# Patient Record
Sex: FEMALE | Race: WHITE | NOT HISPANIC OR LATINO | Employment: FULL TIME | RURAL
[De-identification: names, ages, dates, MRNs, and addresses within clinical notes are randomized per-mention and may not be internally consistent; named-entity substitution may affect disease eponyms.]

---

## 2019-04-03 ENCOUNTER — HISTORICAL (OUTPATIENT)
Dept: ADMINISTRATIVE | Facility: HOSPITAL | Age: 59
End: 2019-04-03

## 2019-04-05 LAB
LAB AP CLINICAL INFORMATION: NORMAL
LAB AP COMMENTS: NORMAL
LAB AP DIAGNOSIS - HISTORICAL: NORMAL
LAB AP GROSS PATHOLOGY - HISTORICAL: NORMAL
LAB AP SPECIMEN SUBMITTED - HISTORICAL: NORMAL

## 2019-06-11 ENCOUNTER — HISTORICAL (OUTPATIENT)
Dept: ADMINISTRATIVE | Facility: HOSPITAL | Age: 59
End: 2019-06-11

## 2019-06-12 LAB
LAB AP CLINICAL INFORMATION: NORMAL
LAB AP DIAGNOSIS - HISTORICAL: NORMAL
LAB AP GROSS PATHOLOGY - HISTORICAL: NORMAL
LAB AP SPECIMEN SUBMITTED - HISTORICAL: NORMAL

## 2020-04-06 ENCOUNTER — HISTORICAL (OUTPATIENT)
Dept: ADMINISTRATIVE | Facility: HOSPITAL | Age: 60
End: 2020-04-06

## 2020-07-17 ENCOUNTER — HISTORICAL (OUTPATIENT)
Dept: ADMINISTRATIVE | Facility: HOSPITAL | Age: 60
End: 2020-07-17

## 2020-11-16 ENCOUNTER — HISTORICAL (OUTPATIENT)
Dept: ADMINISTRATIVE | Facility: HOSPITAL | Age: 60
End: 2020-11-16

## 2020-11-16 LAB
ALBUMIN SERPL BCP-MCNC: 3.7 G/DL (ref 3.5–5)
ALBUMIN/GLOB SERPL: 0.9 {RATIO}
ALP SERPL-CCNC: 105 U/L (ref 46–118)
ALT SERPL W P-5'-P-CCNC: 25 U/L (ref 13–56)
ANION GAP SERPL CALCULATED.3IONS-SCNC: 9.8 MMOL/L (ref 7–16)
AST SERPL W P-5'-P-CCNC: 18 U/L (ref 15–37)
BASOPHILS # BLD AUTO: 0.07 X10E3/UL (ref 0–0.2)
BASOPHILS NFR BLD AUTO: 0.7 % (ref 0–1)
BILIRUB SERPL-MCNC: 0.3 MG/DL (ref 0–1.2)
BUN SERPL-MCNC: 15 MG/DL (ref 7–18)
BUN/CREAT SERPL: 16
CALCIUM SERPL-MCNC: 9.3 MG/DL (ref 8.5–10.1)
CHLORIDE SERPL-SCNC: 102 MMOL/L (ref 98–107)
CHOLEST SERPL-MCNC: 196 MG/DL
CHOLEST/HDLC SERPL: 3.2 {RATIO}
CO2 SERPL-SCNC: 28 MMOL/L (ref 21–32)
CREAT SERPL-MCNC: 0.92 MG/DL (ref 0.5–1.02)
CRP SERPL-MCNC: 1.45 MG/DL (ref 0–0.8)
EOSINOPHIL # BLD AUTO: 0.43 X10E3/UL (ref 0–0.5)
EOSINOPHIL NFR BLD AUTO: 4.3 % (ref 1–4)
ERYTHROCYTE [DISTWIDTH] IN BLOOD BY AUTOMATED COUNT: 13.5 % (ref 11.5–14.5)
GLOBULIN SER-MCNC: 4.1 G/DL (ref 2–4)
GLUCOSE SERPL-MCNC: 95 MG/DL (ref 74–106)
HCT VFR BLD AUTO: 38.6 % (ref 38–47)
HDLC SERPL-MCNC: 62 MG/DL
HGB BLD-MCNC: 12.2 G/DL (ref 12–16)
IMM GRANULOCYTES # BLD AUTO: 0.05 X10E3/UL (ref 0–0.04)
IMM GRANULOCYTES NFR BLD: 0.5 % (ref 0–0.4)
LDLC SERPL CALC-MCNC: 107 MG/DL
LYMPHOCYTES # BLD AUTO: 2.09 X10E3/UL (ref 1–4.8)
LYMPHOCYTES NFR BLD AUTO: 20.9 % (ref 27–41)
MCH RBC QN AUTO: 28.3 PG (ref 27–31)
MCHC RBC AUTO-ENTMCNC: 31.6 G/DL (ref 32–36)
MCV RBC AUTO: 89.6 FL (ref 80–96)
MONOCYTES # BLD AUTO: 0.65 X10E3/UL (ref 0–0.8)
MONOCYTES NFR BLD AUTO: 6.5 % (ref 2–6)
MPC BLD CALC-MCNC: 12 FL (ref 9.4–12.4)
NEUTROPHILS # BLD AUTO: 6.7 X10E3/UL (ref 1.8–7.7)
NEUTROPHILS NFR BLD AUTO: 67.1 % (ref 53–65)
NRBC # BLD AUTO: 0 X10E3/UL (ref 0–0)
NRBC, AUTO (.00): 0 /100 (ref 0–0)
NT-PROBNP SERPL-MCNC: 110 PG/ML (ref 1–125)
PLATELET # BLD AUTO: 290 X10E3/UL (ref 150–400)
POTASSIUM SERPL-SCNC: 4.8 MMOL/L (ref 3.5–5.1)
PROT SERPL-MCNC: 7.8 G/DL (ref 6.4–8.2)
RBC # BLD AUTO: 4.31 X10E6/UL (ref 4.2–5.4)
SODIUM SERPL-SCNC: 135 MMOL/L (ref 136–145)
TRIGL SERPL-MCNC: 134 MG/DL
WBC # BLD AUTO: 9.99 X10E3/UL (ref 4.5–11)

## 2021-03-25 ENCOUNTER — OFFICE VISIT (OUTPATIENT)
Dept: FAMILY MEDICINE | Facility: CLINIC | Age: 61
End: 2021-03-25
Payer: COMMERCIAL

## 2021-03-25 VITALS
HEART RATE: 62 BPM | WEIGHT: 293 LBS | HEIGHT: 66 IN | OXYGEN SATURATION: 98 % | DIASTOLIC BLOOD PRESSURE: 84 MMHG | SYSTOLIC BLOOD PRESSURE: 138 MMHG | RESPIRATION RATE: 18 BRPM | TEMPERATURE: 98 F | BODY MASS INDEX: 47.09 KG/M2

## 2021-03-25 DIAGNOSIS — R53.83 FATIGUE, UNSPECIFIED TYPE: ICD-10-CM

## 2021-03-25 DIAGNOSIS — I10 HYPERTENSION, UNSPECIFIED TYPE: Primary | ICD-10-CM

## 2021-03-25 DIAGNOSIS — J45.909 ASTHMA, UNSPECIFIED ASTHMA SEVERITY, UNSPECIFIED WHETHER COMPLICATED, UNSPECIFIED WHETHER PERSISTENT: ICD-10-CM

## 2021-03-25 DIAGNOSIS — K21.9 GASTROESOPHAGEAL REFLUX DISEASE, UNSPECIFIED WHETHER ESOPHAGITIS PRESENT: ICD-10-CM

## 2021-03-25 DIAGNOSIS — R60.9 EDEMA, UNSPECIFIED TYPE: ICD-10-CM

## 2021-03-25 DIAGNOSIS — E78.5 HYPERLIPIDEMIA, UNSPECIFIED HYPERLIPIDEMIA TYPE: ICD-10-CM

## 2021-03-25 LAB
ALBUMIN SERPL BCP-MCNC: 4.1 G/DL (ref 3.5–5)
ALBUMIN/GLOB SERPL: 1.2 {RATIO}
ALP SERPL-CCNC: 101 U/L (ref 46–118)
ALT SERPL W P-5'-P-CCNC: 18 U/L (ref 13–56)
ANION GAP SERPL CALCULATED.3IONS-SCNC: 10 MMOL/L (ref 7–16)
AST SERPL W P-5'-P-CCNC: 11 U/L (ref 15–37)
BASOPHILS # BLD AUTO: 0.07 X10E3/UL (ref 0–0.2)
BASOPHILS NFR BLD AUTO: 0.8 % (ref 0–1)
BILIRUB SERPL-MCNC: 0.3 MG/DL (ref 0–1.2)
BUN SERPL-MCNC: 20 MG/DL (ref 7–18)
BUN/CREAT SERPL: 20
CALCIUM SERPL-MCNC: 9.2 MG/DL (ref 8.5–10.1)
CHLORIDE SERPL-SCNC: 105 MMOL/L (ref 98–107)
CHOLEST SERPL-MCNC: 217 MG/DL
CO2 SERPL-SCNC: 27 MMOL/L (ref 21–32)
CREAT SERPL-MCNC: 0.98 MG/DL (ref 0.5–1.02)
EOSINOPHIL # BLD AUTO: 0.4 X10E3/UL (ref 0–0.5)
EOSINOPHIL NFR BLD AUTO: 4.7 % (ref 1–4)
ERYTHROCYTE [DISTWIDTH] IN BLOOD BY AUTOMATED COUNT: 13.4 % (ref 11.5–14.5)
GLOBULIN SER-MCNC: 3.4 G/DL (ref 2–4)
GLUCOSE SERPL-MCNC: 79 MG/DL (ref 74–106)
HCT VFR BLD AUTO: 39.3 % (ref 38–47)
HDLC SERPL-MCNC: 62 MG/DL
HGB BLD-MCNC: 12.3 G/DL (ref 12–16)
IMM GRANULOCYTES # BLD AUTO: 0.03 X10E3/UL (ref 0–0.04)
IMM GRANULOCYTES NFR BLD: 0.4 % (ref 0–0.4)
LDLC SERPL CALC-MCNC: 132 MG/DL
LYMPHOCYTES # BLD AUTO: 2.01 X10E3/UL (ref 1–4.8)
LYMPHOCYTES NFR BLD AUTO: 23.8 % (ref 27–41)
MAGNESIUM SERPL-MCNC: 2.3 MG/DL (ref 1.7–2.3)
MCH RBC QN AUTO: 28.7 PG (ref 27–31)
MCHC RBC AUTO-ENTMCNC: 31.3 G/DL (ref 32–36)
MCV RBC AUTO: 91.6 FL (ref 80–96)
MONOCYTES # BLD AUTO: 0.52 X10E3/UL (ref 0–0.8)
MONOCYTES NFR BLD AUTO: 6.2 % (ref 2–6)
MPC BLD CALC-MCNC: 12.2 FL (ref 9.4–12.4)
NEUTROPHILS # BLD AUTO: 5.41 X10E3/UL (ref 1.8–7.7)
NEUTROPHILS NFR BLD AUTO: 64.1 % (ref 53–65)
NONHDLC SERPL-MCNC: 155 MG/DL
NRBC # BLD AUTO: 0 X10E3/UL (ref 0–0)
NRBC, AUTO (.00): 0 /100 (ref 0–0)
PLATELET # BLD AUTO: 280 X10E3/UL (ref 150–400)
POTASSIUM SERPL-SCNC: 4.3 MMOL/L (ref 3.5–5.1)
PROT SERPL-MCNC: 7.5 G/DL (ref 6.4–8.2)
RBC # BLD AUTO: 4.29 X10E6/UL (ref 4.2–5.4)
SODIUM SERPL-SCNC: 138 MMOL/L (ref 136–145)
TRIGL SERPL-MCNC: 117 MG/DL
TSH SERPL DL<=0.005 MIU/L-ACNC: 2.28 UIU/ML (ref 0.36–3.74)
VLDLC SERPL-MCNC: 23 MG/DL
WBC # BLD AUTO: 8.44 X10E3/UL (ref 4.5–11)

## 2021-03-25 PROCEDURE — 99213 PR OFFICE/OUTPT VISIT, EST, LEVL III, 20-29 MIN: ICD-10-PCS | Mod: ,,, | Performed by: NURSE PRACTITIONER

## 2021-03-25 PROCEDURE — 80061 LIPID PANEL: CPT

## 2021-03-25 PROCEDURE — 83735 ASSAY OF MAGNESIUM: CPT

## 2021-03-25 PROCEDURE — 85025 COMPLETE CBC W/AUTO DIFF WBC: CPT

## 2021-03-25 PROCEDURE — 36415 COLL VENOUS BLD VENIPUNCTURE: CPT

## 2021-03-25 PROCEDURE — 84443 ASSAY THYROID STIM HORMONE: CPT

## 2021-03-25 PROCEDURE — 99213 OFFICE O/P EST LOW 20 MIN: CPT | Mod: ,,, | Performed by: NURSE PRACTITIONER

## 2021-03-25 PROCEDURE — 80053 COMPREHEN METABOLIC PANEL: CPT

## 2021-03-25 RX ORDER — DEXLANSOPRAZOLE 60 MG/1
60 CAPSULE, DELAYED RELEASE ORAL DAILY
Qty: 90 CAPSULE | Refills: 1 | Status: SHIPPED | OUTPATIENT
Start: 2021-03-25 | End: 2021-09-28

## 2021-03-25 RX ORDER — OLMESARTAN MEDOXOMIL 40 MG/1
40 TABLET ORAL DAILY
COMMUNITY
End: 2021-03-25 | Stop reason: SDUPTHER

## 2021-03-25 RX ORDER — BUDESONIDE AND FORMOTEROL FUMARATE DIHYDRATE 160; 4.5 UG/1; UG/1
2 AEROSOL RESPIRATORY (INHALATION) EVERY 12 HOURS
COMMUNITY
End: 2021-03-25 | Stop reason: SDUPTHER

## 2021-03-25 RX ORDER — CLONIDINE HYDROCHLORIDE 0.1 MG/1
0.1 TABLET ORAL 3 TIMES DAILY
Qty: 270 TABLET | Refills: 1 | Status: SHIPPED | OUTPATIENT
Start: 2021-03-25 | End: 2022-04-07 | Stop reason: SDUPTHER

## 2021-03-25 RX ORDER — DEXLANSOPRAZOLE 60 MG/1
60 CAPSULE, DELAYED RELEASE ORAL DAILY
COMMUNITY
End: 2021-03-25 | Stop reason: SDUPTHER

## 2021-03-25 RX ORDER — ALBUTEROL SULFATE 90 UG/1
2 AEROSOL, METERED RESPIRATORY (INHALATION) EVERY 4 HOURS PRN
COMMUNITY
End: 2022-04-07 | Stop reason: SDUPTHER

## 2021-03-25 RX ORDER — CARVEDILOL 12.5 MG/1
12.5 TABLET ORAL 2 TIMES DAILY WITH MEALS
COMMUNITY
End: 2021-03-25 | Stop reason: SDUPTHER

## 2021-03-25 RX ORDER — BUDESONIDE AND FORMOTEROL FUMARATE DIHYDRATE 160; 4.5 UG/1; UG/1
2 AEROSOL RESPIRATORY (INHALATION) EVERY 12 HOURS
Qty: 3 INHALER | Refills: 1 | Status: ON HOLD | OUTPATIENT
Start: 2021-03-25 | End: 2021-06-08 | Stop reason: CLARIF

## 2021-03-25 RX ORDER — FUROSEMIDE 20 MG/1
20 TABLET ORAL DAILY
COMMUNITY
End: 2021-03-25 | Stop reason: SDUPTHER

## 2021-03-25 RX ORDER — FUROSEMIDE 20 MG/1
20 TABLET ORAL DAILY
Qty: 90 TABLET | Refills: 1 | Status: SHIPPED | OUTPATIENT
Start: 2021-03-25 | End: 2022-04-07 | Stop reason: SDUPTHER

## 2021-03-25 RX ORDER — ROSUVASTATIN CALCIUM 5 MG/1
5 TABLET, COATED ORAL NIGHTLY
Status: ON HOLD | COMMUNITY
End: 2021-06-08 | Stop reason: CLARIF

## 2021-03-25 RX ORDER — OLMESARTAN MEDOXOMIL 40 MG/1
40 TABLET ORAL DAILY
Qty: 90 TABLET | Refills: 1 | Status: SHIPPED | OUTPATIENT
Start: 2021-03-25 | End: 2022-04-07 | Stop reason: SDUPTHER

## 2021-03-25 RX ORDER — ALBUTEROL SULFATE 2.5 MG/.5ML
2.5 SOLUTION RESPIRATORY (INHALATION) EVERY 6 HOURS PRN
Status: ON HOLD | COMMUNITY
End: 2021-06-08 | Stop reason: CLARIF

## 2021-03-25 RX ORDER — CARVEDILOL 12.5 MG/1
12.5 TABLET ORAL 2 TIMES DAILY WITH MEALS
Qty: 180 TABLET | Refills: 1 | Status: SHIPPED | OUTPATIENT
Start: 2021-03-25 | End: 2021-09-30

## 2021-03-25 RX ORDER — CLONIDINE HYDROCHLORIDE 0.1 MG/1
0.1 TABLET ORAL 3 TIMES DAILY
COMMUNITY
End: 2021-03-25 | Stop reason: SDUPTHER

## 2021-03-31 ENCOUNTER — TELEPHONE (OUTPATIENT)
Dept: FAMILY MEDICINE | Facility: CLINIC | Age: 61
End: 2021-03-31

## 2021-05-17 ENCOUNTER — HOSPITAL ENCOUNTER (OUTPATIENT)
Dept: RADIOLOGY | Facility: HOSPITAL | Age: 61
Discharge: HOME | End: 2021-05-17
Attending: ORTHOPAEDIC SURGERY
Payer: COMMERCIAL

## 2021-05-17 DIAGNOSIS — M25.562 LEFT KNEE PAIN, UNSPECIFIED CHRONICITY: ICD-10-CM

## 2021-05-17 PROBLEM — S83.282A ACUTE LATERAL MENISCUS TEAR OF LEFT KNEE: Status: ACTIVE | Noted: 2021-05-17

## 2021-05-17 PROCEDURE — 73564 X-RAY EXAM KNEE 4 OR MORE: CPT | Mod: TC,LT

## 2021-06-07 PROBLEM — S83.242A ACUTE MEDIAL MENISCUS TEAR OF LEFT KNEE: Status: ACTIVE | Noted: 2021-06-07

## 2021-06-08 ENCOUNTER — HOSPITAL ENCOUNTER (OUTPATIENT)
Facility: HOSPITAL | Age: 61
Discharge: HOME | End: 2021-06-08
Attending: ORTHOPAEDIC SURGERY | Admitting: ORTHOPAEDIC SURGERY
Payer: COMMERCIAL

## 2021-06-08 ENCOUNTER — ANESTHESIA (OUTPATIENT)
Dept: SURGERY | Facility: HOSPITAL | Age: 61
End: 2021-06-08
Payer: COMMERCIAL

## 2021-06-08 ENCOUNTER — ANESTHESIA EVENT (OUTPATIENT)
Dept: SURGERY | Facility: HOSPITAL | Age: 61
End: 2021-06-08
Payer: COMMERCIAL

## 2021-06-08 VITALS
TEMPERATURE: 97 F | HEART RATE: 67 BPM | OXYGEN SATURATION: 98 % | BODY MASS INDEX: 47.09 KG/M2 | HEIGHT: 66 IN | SYSTOLIC BLOOD PRESSURE: 168 MMHG | WEIGHT: 293 LBS | DIASTOLIC BLOOD PRESSURE: 58 MMHG | RESPIRATION RATE: 16 BRPM

## 2021-06-08 DIAGNOSIS — I10 HTN (HYPERTENSION): ICD-10-CM

## 2021-06-08 DIAGNOSIS — S83.242A ACUTE MEDIAL MENISCUS TEAR OF LEFT KNEE: ICD-10-CM

## 2021-06-08 PROCEDURE — 93010 EKG 12-LEAD: ICD-10-PCS | Mod: ,,, | Performed by: INTERNAL MEDICINE

## 2021-06-08 PROCEDURE — 71000015 HC POSTOP RECOV 1ST HR: Performed by: ORTHOPAEDIC SURGERY

## 2021-06-08 PROCEDURE — 63600175 PHARM REV CODE 636 W HCPCS: Performed by: ORTHOPAEDIC SURGERY

## 2021-06-08 PROCEDURE — 97161 PT EVAL LOW COMPLEX 20 MIN: CPT

## 2021-06-08 PROCEDURE — 27000177 HC AIRWAY, LARYNGEAL MASK: Performed by: ANESTHESIOLOGY

## 2021-06-08 PROCEDURE — 27201423 OPTIME MED/SURG SUP & DEVICES STERILE SUPPLY: Performed by: ORTHOPAEDIC SURGERY

## 2021-06-08 PROCEDURE — 37000008 HC ANESTHESIA 1ST 15 MINUTES: Performed by: ORTHOPAEDIC SURGERY

## 2021-06-08 PROCEDURE — 63600175 PHARM REV CODE 636 W HCPCS: Performed by: ANESTHESIOLOGY

## 2021-06-08 PROCEDURE — 71000016 HC POSTOP RECOV ADDL HR: Performed by: ORTHOPAEDIC SURGERY

## 2021-06-08 PROCEDURE — 25000003 PHARM REV CODE 250: Performed by: ORTHOPAEDIC SURGERY

## 2021-06-08 PROCEDURE — 93005 ELECTROCARDIOGRAM TRACING: CPT

## 2021-06-08 PROCEDURE — 27000655: Performed by: ANESTHESIOLOGY

## 2021-06-08 PROCEDURE — 27000716 HC OXISENSOR PROBE, ANY SIZE: Performed by: ANESTHESIOLOGY

## 2021-06-08 PROCEDURE — D9220A PRA ANESTHESIA: ICD-10-PCS | Mod: ANES,,, | Performed by: ANESTHESIOLOGY

## 2021-06-08 PROCEDURE — 25000003 PHARM REV CODE 250: Performed by: NURSE ANESTHETIST, CERTIFIED REGISTERED

## 2021-06-08 PROCEDURE — 63600175 PHARM REV CODE 636 W HCPCS: Performed by: NURSE ANESTHETIST, CERTIFIED REGISTERED

## 2021-06-08 PROCEDURE — 71000033 HC RECOVERY, INTIAL HOUR: Performed by: ORTHOPAEDIC SURGERY

## 2021-06-08 PROCEDURE — 27000260 *HC AIRWAY ORAL: Performed by: ANESTHESIOLOGY

## 2021-06-08 PROCEDURE — D9220A PRA ANESTHESIA: Mod: ANES,,, | Performed by: ANESTHESIOLOGY

## 2021-06-08 PROCEDURE — 27100168 OPTIME MED/SURG SUP & DEVICES NON-STERILE SUPPLY: Performed by: ORTHOPAEDIC SURGERY

## 2021-06-08 PROCEDURE — 25000003 PHARM REV CODE 250: Performed by: ANESTHESIOLOGY

## 2021-06-08 PROCEDURE — 36000711: Performed by: ORTHOPAEDIC SURGERY

## 2021-06-08 PROCEDURE — 36000710: Performed by: ORTHOPAEDIC SURGERY

## 2021-06-08 PROCEDURE — 93010 ELECTROCARDIOGRAM REPORT: CPT | Mod: ,,, | Performed by: INTERNAL MEDICINE

## 2021-06-08 PROCEDURE — D9220A PRA ANESTHESIA: ICD-10-PCS | Mod: CRNA,,, | Performed by: NURSE ANESTHETIST, CERTIFIED REGISTERED

## 2021-06-08 PROCEDURE — 37000009 HC ANESTHESIA EA ADD 15 MINS: Performed by: ORTHOPAEDIC SURGERY

## 2021-06-08 PROCEDURE — D9220A PRA ANESTHESIA: Mod: CRNA,,, | Performed by: NURSE ANESTHETIST, CERTIFIED REGISTERED

## 2021-06-08 RX ORDER — ONDANSETRON 2 MG/ML
INJECTION INTRAMUSCULAR; INTRAVENOUS
Status: DISCONTINUED | OUTPATIENT
Start: 2021-06-08 | End: 2021-06-08

## 2021-06-08 RX ORDER — HYDROCODONE BITARTRATE AND ACETAMINOPHEN 5; 325 MG/1; MG/1
1 TABLET ORAL EVERY 4 HOURS PRN
Status: DISCONTINUED | OUTPATIENT
Start: 2021-06-08 | End: 2021-06-08 | Stop reason: HOSPADM

## 2021-06-08 RX ORDER — PROPOFOL 10 MG/ML
VIAL (ML) INTRAVENOUS
Status: DISCONTINUED | OUTPATIENT
Start: 2021-06-08 | End: 2021-06-08

## 2021-06-08 RX ORDER — CEFAZOLIN SODIUM 2 G/50ML
2 SOLUTION INTRAVENOUS
Status: COMPLETED | OUTPATIENT
Start: 2021-06-08 | End: 2021-06-08

## 2021-06-08 RX ORDER — ONDANSETRON 2 MG/ML
4 INJECTION INTRAMUSCULAR; INTRAVENOUS DAILY PRN
Status: DISCONTINUED | OUTPATIENT
Start: 2021-06-08 | End: 2021-06-08 | Stop reason: HOSPADM

## 2021-06-08 RX ORDER — DIPHENHYDRAMINE HYDROCHLORIDE 50 MG/ML
25 INJECTION INTRAMUSCULAR; INTRAVENOUS EVERY 6 HOURS PRN
Status: DISCONTINUED | OUTPATIENT
Start: 2021-06-08 | End: 2021-06-08 | Stop reason: HOSPADM

## 2021-06-08 RX ORDER — BUPIVACAINE HYDROCHLORIDE 2.5 MG/ML
INJECTION, SOLUTION EPIDURAL; INFILTRATION; INTRACAUDAL
Status: DISCONTINUED | OUTPATIENT
Start: 2021-06-08 | End: 2021-06-08 | Stop reason: HOSPADM

## 2021-06-08 RX ORDER — EPINEPHRINE 1 MG/ML
INJECTION, SOLUTION INTRACARDIAC; INTRAMUSCULAR; INTRAVENOUS; SUBCUTANEOUS
Status: DISCONTINUED | OUTPATIENT
Start: 2021-06-08 | End: 2021-06-08 | Stop reason: HOSPADM

## 2021-06-08 RX ORDER — ACETAMINOPHEN 500 MG
1000 TABLET ORAL ONCE
Status: COMPLETED | OUTPATIENT
Start: 2021-06-08 | End: 2021-06-08

## 2021-06-08 RX ORDER — LIDOCAINE HYDROCHLORIDE 10 MG/ML
1 INJECTION, SOLUTION EPIDURAL; INFILTRATION; INTRACAUDAL; PERINEURAL ONCE
Status: DISCONTINUED | OUTPATIENT
Start: 2021-06-08 | End: 2021-06-08 | Stop reason: HOSPADM

## 2021-06-08 RX ORDER — MIDAZOLAM HYDROCHLORIDE 1 MG/ML
INJECTION INTRAMUSCULAR; INTRAVENOUS
Status: DISCONTINUED | OUTPATIENT
Start: 2021-06-08 | End: 2021-06-08

## 2021-06-08 RX ORDER — DEXAMETHASONE SODIUM PHOSPHATE 4 MG/ML
INJECTION, SOLUTION INTRA-ARTICULAR; INTRALESIONAL; INTRAMUSCULAR; INTRAVENOUS; SOFT TISSUE
Status: DISCONTINUED | OUTPATIENT
Start: 2021-06-08 | End: 2021-06-08

## 2021-06-08 RX ORDER — PROMETHAZINE HYDROCHLORIDE 25 MG/1
25 TABLET ORAL EVERY 6 HOURS PRN
Status: DISCONTINUED | OUTPATIENT
Start: 2021-06-08 | End: 2021-06-08 | Stop reason: HOSPADM

## 2021-06-08 RX ORDER — SODIUM CHLORIDE 0.9 G/100ML
IRRIGANT IRRIGATION
Status: DISCONTINUED | OUTPATIENT
Start: 2021-06-08 | End: 2021-06-08 | Stop reason: HOSPADM

## 2021-06-08 RX ORDER — FENTANYL CITRATE 50 UG/ML
INJECTION, SOLUTION INTRAMUSCULAR; INTRAVENOUS
Status: DISCONTINUED | OUTPATIENT
Start: 2021-06-08 | End: 2021-06-08

## 2021-06-08 RX ORDER — LIDOCAINE HYDROCHLORIDE 20 MG/ML
INJECTION, SOLUTION EPIDURAL; INFILTRATION; INTRACAUDAL; PERINEURAL
Status: DISCONTINUED | OUTPATIENT
Start: 2021-06-08 | End: 2021-06-08

## 2021-06-08 RX ORDER — SODIUM CHLORIDE, SODIUM LACTATE, POTASSIUM CHLORIDE, CALCIUM CHLORIDE 600; 310; 30; 20 MG/100ML; MG/100ML; MG/100ML; MG/100ML
INJECTION, SOLUTION INTRAVENOUS CONTINUOUS
Status: DISCONTINUED | OUTPATIENT
Start: 2021-06-08 | End: 2021-06-08 | Stop reason: HOSPADM

## 2021-06-08 RX ORDER — HYDROCODONE BITARTRATE AND ACETAMINOPHEN 10; 325 MG/1; MG/1
1 TABLET ORAL EVERY 6 HOURS PRN
Qty: 28 TABLET | Refills: 0 | Status: SHIPPED | OUTPATIENT
Start: 2021-06-08 | End: 2022-04-07

## 2021-06-08 RX ORDER — MORPHINE SULFATE 10 MG/ML
4 INJECTION INTRAMUSCULAR; INTRAVENOUS; SUBCUTANEOUS EVERY 5 MIN PRN
Status: DISCONTINUED | OUTPATIENT
Start: 2021-06-08 | End: 2021-06-08

## 2021-06-08 RX ORDER — MEPERIDINE HYDROCHLORIDE 25 MG/ML
25 INJECTION INTRAMUSCULAR; INTRAVENOUS; SUBCUTANEOUS EVERY 10 MIN PRN
Status: DISCONTINUED | OUTPATIENT
Start: 2021-06-08 | End: 2021-06-08 | Stop reason: HOSPADM

## 2021-06-08 RX ORDER — HYDROCODONE BITARTRATE AND ACETAMINOPHEN 10; 325 MG/1; MG/1
1 TABLET ORAL EVERY 4 HOURS PRN
Status: DISCONTINUED | OUTPATIENT
Start: 2021-06-08 | End: 2021-06-08 | Stop reason: HOSPADM

## 2021-06-08 RX ORDER — ONDANSETRON 4 MG/1
8 TABLET, ORALLY DISINTEGRATING ORAL EVERY 8 HOURS PRN
Status: DISCONTINUED | OUTPATIENT
Start: 2021-06-08 | End: 2021-06-08 | Stop reason: HOSPADM

## 2021-06-08 RX ORDER — ACETAMINOPHEN 500 MG
1000 TABLET ORAL EVERY 6 HOURS PRN
Status: DISCONTINUED | OUTPATIENT
Start: 2021-06-08 | End: 2021-06-08 | Stop reason: HOSPADM

## 2021-06-08 RX ORDER — SODIUM CHLORIDE 9 MG/ML
INJECTION, SOLUTION INTRAVENOUS CONTINUOUS
Status: DISCONTINUED | OUTPATIENT
Start: 2021-06-08 | End: 2021-06-08 | Stop reason: HOSPADM

## 2021-06-08 RX ORDER — HYDROMORPHONE HYDROCHLORIDE 2 MG/ML
0.5 INJECTION, SOLUTION INTRAMUSCULAR; INTRAVENOUS; SUBCUTANEOUS EVERY 5 MIN PRN
Status: DISCONTINUED | OUTPATIENT
Start: 2021-06-08 | End: 2021-06-08 | Stop reason: HOSPADM

## 2021-06-08 RX ADMIN — LIDOCAINE HYDROCHLORIDE 80 MG: 20 INJECTION, SOLUTION INTRAVENOUS at 11:06

## 2021-06-08 RX ADMIN — SODIUM CHLORIDE: 9 INJECTION, SOLUTION INTRAVENOUS at 12:06

## 2021-06-08 RX ADMIN — FENTANYL CITRATE 25 MCG: 50 INJECTION INTRAMUSCULAR; INTRAVENOUS at 12:06

## 2021-06-08 RX ADMIN — HYDROMORPHONE HYDROCHLORIDE 0.5 MG: 2 INJECTION, SOLUTION INTRAMUSCULAR; INTRAVENOUS; SUBCUTANEOUS at 12:06

## 2021-06-08 RX ADMIN — MIDAZOLAM 2 MG: 1 INJECTION INTRAMUSCULAR; INTRAVENOUS at 11:06

## 2021-06-08 RX ADMIN — DEXAMETHASONE SODIUM PHOSPHATE 4 MG: 4 INJECTION, SOLUTION INTRA-ARTICULAR; INTRALESIONAL; INTRAMUSCULAR; INTRAVENOUS; SOFT TISSUE at 12:06

## 2021-06-08 RX ADMIN — SODIUM CHLORIDE: 9 INJECTION, SOLUTION INTRAVENOUS at 08:06

## 2021-06-08 RX ADMIN — FENTANYL CITRATE 25 MCG: 50 INJECTION INTRAMUSCULAR; INTRAVENOUS at 11:06

## 2021-06-08 RX ADMIN — PROPOFOL 200 MG: 10 INJECTION, EMULSION INTRAVENOUS at 11:06

## 2021-06-08 RX ADMIN — PROPOFOL 50 MG: 10 INJECTION, EMULSION INTRAVENOUS at 11:06

## 2021-06-08 RX ADMIN — ACETAMINOPHEN 1000 MG: 500 TABLET ORAL at 10:06

## 2021-06-08 RX ADMIN — ONDANSETRON 4 MG: 2 INJECTION INTRAMUSCULAR; INTRAVENOUS at 11:06

## 2021-06-08 RX ADMIN — FENTANYL CITRATE 50 MCG: 50 INJECTION INTRAMUSCULAR; INTRAVENOUS at 11:06

## 2021-06-08 RX ADMIN — CEFAZOLIN 3 G: 1 INJECTION, POWDER, FOR SOLUTION INTRAVENOUS at 11:06

## 2021-06-23 PROBLEM — S83.242A ACUTE MEDIAL MENISCUS TEAR OF LEFT KNEE: Status: RESOLVED | Noted: 2021-06-07 | Resolved: 2021-06-23

## 2021-06-23 PROBLEM — S83.282A ACUTE LATERAL MENISCUS TEAR OF LEFT KNEE: Status: RESOLVED | Noted: 2021-05-17 | Resolved: 2021-06-23

## 2022-02-10 ENCOUNTER — OFFICE VISIT (OUTPATIENT)
Dept: DERMATOLOGY | Facility: CLINIC | Age: 62
End: 2022-02-10
Payer: COMMERCIAL

## 2022-02-10 DIAGNOSIS — L57.8 OTHER SKIN CHANGES DUE TO CHRONIC EXPOSURE TO NONIONIZING RADIATION: Primary | ICD-10-CM

## 2022-02-10 DIAGNOSIS — L82.1 SEBORRHEIC KERATOSES: ICD-10-CM

## 2022-02-10 DIAGNOSIS — Z80.8 FAMILY HISTORY OF MELANOMA: ICD-10-CM

## 2022-02-10 DIAGNOSIS — Z85.828 HISTORY OF BASAL CELL CARCINOMA OF SKIN: ICD-10-CM

## 2022-02-10 DIAGNOSIS — D48.9 NEOPLASM OF UNCERTAIN BEHAVIOR: ICD-10-CM

## 2022-02-10 DIAGNOSIS — D18.01 CHERRY ANGIOMA: ICD-10-CM

## 2022-02-10 PROCEDURE — 99213 PR OFFICE/OUTPT VISIT, EST, LEVL III, 20-29 MIN: ICD-10-PCS | Mod: 25,,, | Performed by: DERMATOLOGY

## 2022-02-10 PROCEDURE — 88305 PATHOLOGY, DERMATOLOGY: ICD-10-PCS | Mod: 26,,, | Performed by: PATHOLOGY

## 2022-02-10 PROCEDURE — 88305 TISSUE EXAM BY PATHOLOGIST: CPT | Mod: SUR | Performed by: DERMATOLOGY

## 2022-02-10 PROCEDURE — 99213 OFFICE O/P EST LOW 20 MIN: CPT | Mod: 25,,, | Performed by: DERMATOLOGY

## 2022-02-10 PROCEDURE — 99499 NO LOS: ICD-10-PCS | Mod: ,,, | Performed by: DERMATOLOGY

## 2022-02-10 PROCEDURE — 11102 PR TANGENTIAL BIOPSY, SKIN, SINGLE LESION: ICD-10-PCS | Mod: ,,, | Performed by: DERMATOLOGY

## 2022-02-10 PROCEDURE — 88305 TISSUE EXAM BY PATHOLOGIST: CPT | Mod: 26,,, | Performed by: PATHOLOGY

## 2022-02-10 PROCEDURE — 11102 TANGNTL BX SKIN SINGLE LES: CPT | Mod: ,,, | Performed by: DERMATOLOGY

## 2022-02-10 PROCEDURE — 99499 UNLISTED E&M SERVICE: CPT | Mod: ,,, | Performed by: DERMATOLOGY

## 2022-02-10 PROCEDURE — 1159F MED LIST DOCD IN RCRD: CPT | Mod: CPTII,,, | Performed by: DERMATOLOGY

## 2022-02-10 PROCEDURE — 1160F RVW MEDS BY RX/DR IN RCRD: CPT | Mod: CPTII,,, | Performed by: DERMATOLOGY

## 2022-02-10 PROCEDURE — 1159F PR MEDICATION LIST DOCUMENTED IN MEDICAL RECORD: ICD-10-PCS | Mod: CPTII,,, | Performed by: DERMATOLOGY

## 2022-02-10 PROCEDURE — 1160F PR REVIEW ALL MEDS BY PRESCRIBER/CLIN PHARMACIST DOCUMENTED: ICD-10-PCS | Mod: CPTII,,, | Performed by: DERMATOLOGY

## 2022-02-10 NOTE — PROGRESS NOTES
[facility]   Leila Thornton MD    Patient Name: Kat Nam  Patient YOB: 1960   Date of Service: 2/10/22    CC: Neoplasm of uncertain behavior    HPI: Kat Nam is a 61 y.o. female here today for a lesion, located on the left eyebrow.  The lesion has been present for 3 weeks.  Previous treatments include none.      Past Medical History:   Diagnosis Date    Asthma     Bilateral leg pain     Chronic low back pain     Compression of vein     Iliac vein compression syndrome    Digestive disorder     Edema of lower extremity     Gastroesophageal reflux disease     Hx of phlebitis     Hx of thrombophlebitis     Hyperlipidemia     Hypertension     Lymphedema, not elsewhere classified     Morbid obesity     Other skin changes     Peripheral edema     Peripheral vascular disease, unspecified     Postartificial menopausal syndrome     Premature atrial contraction     Sleep apnea      Past Surgical History:   Procedure Laterality Date    ARTHROSCOPY OF KNEE Left 2021    Procedure: ARTHROSCOPY, KNEE;  Surgeon: Noman Huerta MD;  Location: [facility];  Service: Orthopedics;  Laterality: Left;     SECTION      CHOLECYSTECTOMY      EXCISION OF MEDIAL MENISCUS OF KNEE Left 2021    Procedure: MENISCECTOMY, KNEE, MEDIAL;  Surgeon: Noman Huerta MD;  Location: [facility];  Service: Orthopedics;  Laterality: Left;    HYSTERECTOMY      KNEE ARTHROSCOPY W/ MENISCECTOMY Left 2021    Procedure: ARTHROSCOPY, KNEE, WITH MENISCECTOMY;  Surgeon: Noman Huerta MD;  Location: [facility];  Service: Orthopedics;  Laterality: Left;  LATERAL MENISECTOMY    SPINE SURGERY      Lumbar Laminectomy, L4-L5     Review of patient's allergies indicates:   Allergen Reactions    Poppy Swelling    Aldomet amanda hcl injection      Headache       Current Outpatient Medications:     albuterol (PROVENTIL/VENTOLIN HFA) 90 mcg/actuation inhaler, Inhale  2 puffs into the lungs every 4 (four) hours as needed for Wheezing. Rescue, Disp: , Rfl:     carvediloL (COREG) 12.5 MG tablet, TAKE ONE (1) TABLET BY MOUTH TWO (2) (TWO) TIMES DAILY WITH MEALS., Disp: 180 tablet, Rfl: 1    cloNIDine (CATAPRES) 0.1 MG tablet, Take 1 tablet (0.1 mg total) by mouth 3 (three) times daily., Disp: 270 tablet, Rfl: 1    DEXILANT 60 mg capsule, TAKE ONE (1) CAPSULE BY MOUTH ONCE DAILY., Disp: 90 capsule, Rfl: 1    furosemide (LASIX) 20 MG tablet, Take 1 tablet (20 mg total) by mouth once daily., Disp: 90 tablet, Rfl: 1    HYDROcodone-acetaminophen (NORCO)  mg per tablet, Take 1 tablet by mouth every 6 (six) hours as needed for Pain., Disp: 28 tablet, Rfl: 0    magnesium chloride (MAG 64) 64 mg TbEC, Take 64 mg by mouth once., Disp: , Rfl:     olmesartan (BENICAR) 40 MG tablet, Take 1 tablet (40 mg total) by mouth once daily., Disp: 90 tablet, Rfl: 1    ROS: A focused review of systems was obtained and negative.     Exam: A focused skin exam was performed. All areas examined were normal except as mentioned in the assessment and plan below.  General Appearance of the patient is well developed and well nourished.  Orientation: alert and oriented x 3.  Mood and affect: pleasant.    Assessment:   The primary encounter diagnosis was Other skin changes due to chronic exposure to nonionizing radiation. Diagnoses of Neoplasm of uncertain behavior and Cherry angioma were also pertinent to this visit.    Plan:      Neoplasm of Uncertain Behavior (D48.5)  - Pink papule located on the left medial eyebrow  Ddx includes: AK vs BCC vs ISK    Plan: Counseling.  I counseled the patient regarding the following:  Instructions: Neoplasms of Uncertain Behavior can be observed, biopsied or surgically removed depending on the  level of clinical suspicion.  Instructions: Neoplasms of Uncertain Behavior can be observed, biopsied or surgically removed depending on the  level of clinical  suspicion.  Contact Office if: patient develops any new lesions that fail to heal, ulcerate or bleed.    Plan: Biopsy by Shave Method.  Location (1): Left medial eyebrow  Written consent was obtained and risks were reviewed including but not  limited to scarring, infection, bleeding, scabbing, incomplete removal, nerve damage and allergy to anesthesia.  The area was prepped with Chloraprep. Local anesthesia was obtained with approximately 0.5cc of 1% lidocaine  with epinephrine. A biopsy by shave method to the level of the dermis (sent for H and E) was performed using  a Dermablade on the above location. Aluminum chloride was used for hemostasis. Following the biopsy  Petrolatum and a bandage were applied. Patient will be notified of biopsy results. However, patient instructed to  call the office if not contacted within 2 weeks.    Other Skin Changes Due to Chronic Exposure of Nonionizing Radiation (L57.8)    Plan: Monitoring.     Plan: Sunscreen Recommendations.  I recommended a broad spectrum sunscreen with a SPF of 30 or higher. I explained that SPF 30 sunscreens block approximately 97 percent of the  sun's harmful rays. Sunscreens should be applied at least 15 minutes prior to expected sun exposure and then every 2 hours after that as long as  sun exposure continues. If swimming or exercising sunscreen should be reapplied every 45 minutes to an hour after getting wet or sweating. One  ounce, or the equivalent of a shot glass full of sunscreen, is adequate to protect the skin not covered by a bathing suit. I also recommended a lip  balm with a sunscreen as well. Sun protective clothing can be used in lieu of sunscreen but must be worn the entire time you are exposed to the  sun's rays.    Oliver Angiomas  - Scattered bright pink papules    Plan: Counseling  I counseled the patient regarding the diagnosis including that cherry angiomas are benign skin growths requiring no treatment. Patients get more as they  age.      Follow up in about 3 months (around 5/10/2022).    Leila Thornton MD

## 2022-02-14 LAB
ESTROGEN SERPL-MCNC: NORMAL PG/ML
LAB AP GROSS DESCRIPTION: NORMAL
LAB AP LABORATORY NOTES: NORMAL
LAB AP SPEC A DDX: NORMAL
LAB AP SPEC A MORPHOLOGY: NORMAL
LAB AP SPEC A PROCEDURE: NORMAL
T3RU NFR SERPL: NORMAL %

## 2022-02-24 NOTE — PROGRESS NOTES
Mohs Surgery Consult Note    Kat Nam is a 61 y.o. female who is referred by Dr. Thornton for evaluation of a BCC on the left eyebrow.     Recurrent skin cancer: No    Preoperative Risk Factors:  Current Anticoagulants: Yes  Endocarditis / Rheumatic Fever hx: No  Immunocompromised: No  Prosthetic joint: No  Congenital heart defect: No  Prosthetic heart valve: No  Diabetic: No  Transplant: No  Pacemaker: No  Defibrillator:  No  Prior problem with local anesthesia: No  Tobacco History: No]  Clindamycin Allergy: No  Pregnant: no     Transmissible Diseases:  HIV No  Hepatitis B or C  No      Exam:  Limited skin exam is normal except for a ~ 1.4 cm BCC  located on the left eyebrow  .    Pathologic Findings:  Accession # [id number]  Diagnosis: BCC    Assessment and Plan:  Treatment Options : The various treatment options for skin cancer removal were reviewed with the patient in detail. These include Mohs surgery with its high cure rate, excisional surgery, destructive treatment, radiation therapy, and various topical therapies.  Given the indications and high cure rate, the patient has agreed to proceed with Mohs.  Risks and Benefits : The rationale for Mohs was explained to the patient. The risks and benefits to therapy were discussed in detail. Specifically, the risks of infection, scarring, bleeding, dehiscence, hematoma, prolonged wound healing, incomplete removal, allergy to anesthesia, nerve injury, inability to clear the tumor, and recurrence were addressed. The treatment site was clearly identified and confirmed by the patient.    Plan:  Mohs Micrographic Surgery    Indication for Mohs: Clinical area critical for tissue conservation (Area H: central face, eyelids, eyebrows, nose, lips, chin, ear, periauricular, temple, genitalia, hands, feet, ankles, nail units and areola) and Poorly-defined clinical tumor borders  Mohs AUC Score: 9   Proposed closure: Bilateral advancement   Indication for antibiotics:  Clindamycin solution used in lidocaine.     I evaluated the pathology report, correlated with clinical findings and patient history, and considered patient risk factors such as current anticoagulant use. I have corresponded with the referring physician. A major surgery (flap) was determined to be necessary.      Jensen Garcia MD   Mohs Surgery/Dermatologic Oncology

## 2022-02-24 NOTE — PATIENT INSTRUCTIONS

## 2022-02-28 ENCOUNTER — PROCEDURE VISIT (OUTPATIENT)
Dept: DERMATOLOGY | Facility: CLINIC | Age: 62
End: 2022-02-28
Payer: COMMERCIAL

## 2022-02-28 VITALS
WEIGHT: 293 LBS | RESPIRATION RATE: 19 BRPM | SYSTOLIC BLOOD PRESSURE: 127 MMHG | HEART RATE: 64 BPM | BODY MASS INDEX: 47.09 KG/M2 | HEIGHT: 66 IN | DIASTOLIC BLOOD PRESSURE: 71 MMHG

## 2022-02-28 DIAGNOSIS — C44.319 BASAL CELL CARCINOMA (BCC) OF EYEBROW: Primary | ICD-10-CM

## 2022-02-28 PROCEDURE — 17311: ICD-10-PCS | Mod: 51,,, | Performed by: STUDENT IN AN ORGANIZED HEALTH CARE EDUCATION/TRAINING PROGRAM

## 2022-02-28 PROCEDURE — 17312 MOHS ADDL STAGE: CPT | Mod: ,,, | Performed by: STUDENT IN AN ORGANIZED HEALTH CARE EDUCATION/TRAINING PROGRAM

## 2022-02-28 PROCEDURE — 14040 TIS TRNFR F/C/C/M/N/A/G/H/F: CPT | Mod: ,,, | Performed by: STUDENT IN AN ORGANIZED HEALTH CARE EDUCATION/TRAINING PROGRAM

## 2022-02-28 PROCEDURE — 17312: ICD-10-PCS | Mod: ,,, | Performed by: STUDENT IN AN ORGANIZED HEALTH CARE EDUCATION/TRAINING PROGRAM

## 2022-02-28 PROCEDURE — 14040 PR ADJ TISS XFER HEAD,FAC,HAND <10 SQCM: ICD-10-PCS | Mod: ,,, | Performed by: STUDENT IN AN ORGANIZED HEALTH CARE EDUCATION/TRAINING PROGRAM

## 2022-02-28 PROCEDURE — 17311 MOHS 1 STAGE H/N/HF/G: CPT | Mod: 51,,, | Performed by: STUDENT IN AN ORGANIZED HEALTH CARE EDUCATION/TRAINING PROGRAM

## 2022-02-28 NOTE — PROGRESS NOTES
Mohs Surgery Operative Note    Patient name: Kat Nam  Date: 02/28/2022    Mohs accession #:   Previous dermpath accession #: [id number]  Location: L eyebrow  Preop diagnosis:BCC   Postop diagnosis: Same  Mohs AUC score: 9  Number of stages: 3  Preop size: 0.7 cm x 0.5 cm  Postop size: 1.4 cm x 1.3 cm   Depth of defect: fat   Repair type: advancement flap     Surgeon and Pathologist: LAMIN Garcia MD     Indications for Mohs Surgery    Removal of the patient's tumor is complicated by the following clinical features: Clinical area critical for tissue conservation (Area H: central face, eyelids, eyebrows, nose, lips, chin, ear, periauricular, temple, genitalia, hands, feet, ankles, nail units and areola) and Poorly-defined clinical tumor borders    Based on my medical judgement, Mohs surgery is the most appropriate treatment for this cancer compared to other treatments. I discussed alternative treatments to Mohs surgery and specifically discussed the risks and benefits of curettage, excision with permanent sections, and foregoing treatment. The rationale for Mohs was explained to the patient and consent was obtained. The risks, benefits and alternatives to therapy were discussed in detail. Specifically, the risks of infection, scarring, bleeding, prolonged wound healing, incomplete removal, allergy to anesthesia, nerve injury and recurrence were addressed. Prior to the procedure, the treatment site was clearly identified and confirmed by the patient. All components of Universal Protocol/PAUSE Rule completed. DNAY Garcia MD operated in two distinct and integrated capacities as the surgeon and pathologist.    STAGE I:  The patient was placed on the operating table. The cancer was identified and outlined. The entire surgical field was prepped with iodine The surgical site was anesthetized using Lidocaine 1% with epinephrine 1:100,000 buffered with sodium bicarbonate 8.4% in a 1:10 ratio.    The area of  clinically apparent tumor was debulked with a 2 mm curette. The layer of tissue was then surgically excised using a #15 blade and was then transferred onto a specimen sheet maintaining the orientation of the specimen. Hemostasis was obtained using monopolar electrodesiccation. The wound site was then covered with a dressing while the tissue samples were processed for examination.    The specimen was oriented, mapped and divided. Each section was then inked and processed in the Mohs lab using the Mohs protocol and submitted for frozen section. The histopathologic sections were reviewed by the surgeon in conjunction with the reference map.     Total blocks: 1 Total slides: 2    Frozen section analysis revealed: residual tumor present on stage 1. Tumor was indicated in red on the reference map.    Cell morphology: Jagged basaloids nests with mucinous stroma in the dermis  Pathological Pattern: Infiltrative basal cell carcinoma  Depth of invasion: Fat  Presence of scar tissue: No  Perineural invasion: No  Actinic keratosis: No  Inflammation obscuring possible tumor presence: No    STAGE II:  The patient was prepped in the same fashion as the first stage. Using a similar technique to that described above, a thin layer of tissue was removed from all areas where tumor was visible on the previous stage. The tissue was again oriented, mapped, dyed, and processed as above. Histopathologic sections were reviewed in conjunction with the reference map.     Total blocks: 1 Total slides: 1    Residual tumor was identified and indicated in red on the reference map, identifying the location where further tissue excision was necessary.  Therefore, an additional stage of Mohs Micrographic surgery was deemed necessary. Tumor characteristics were the same as the first stage.    STAGE III:  The patient was prepped in the same fashion as the first stage. Using a similar technique to that described above, a thin layer of tissue was removed  from all areas where tumor was visible on the previous stage. The tissue was again oriented, mapped, dyed, and processed as above. Histopathologic sections were reviewed in conjunction with the reference map.     Total blocks: 1 Total slides: 1    Frozen section analysis revealed: No additional tumor identified on microscopic examination by the surgeon. Histology: No malignant cells seen in the sections examined.    Additional Histologic Findings: None    REPAIR: Bilateral Advancement Flap    Indication for flap: A flap was chosen because closing the wound by second intention, primary linear closure, or when skin grafting would result in a functionally unsatisfactory result. Additionally, a flap was chosen to restore normal eyebrow appearance.     Primary Surgeon : LAMIN Garcia MD  Repair Size: 1 x 3.5 cm  Sutures:  4-0 monocryl; 5-0 prolene     The defect was identified and a marking pen was used to plan the repair. The area was infiltrated with Lidocaine 1% with epinephrine 1:100,000 buffered with sodium bicarbonate 8.4% in a 1:10 ratio, prepped with chlorhexidine and draped with sterile towels.  The flap was incised using a #15 blade to adipose on either side of the L eyebrow and undermined widely. The defect was debeveled and undermined and an adjacent standing cone was removed. Hemostasis was obtained using monopolar electrodesiccation. The flap was advanced and secured with buried vertical mattress sutures placed in the dermis and subcutaneous tissue.. Percutaneous simple running sutures were carefully placed for maximum eversion and meticulous wound edge approximation. Careful attention was paid to avoid distorting any nearby free margins.  The wound was cleansed with saline and ointment was applied along the wound surface. A sterile pressure dressing was applied. Wound care instructions were given verbally and in writing. The patient left the operating suite in stable condition. Patient was informed that  additional refinement of the resulting surgical scar may be used as a second stage of this reconstruction.    Jensen Garcia MD   Mohs Surgery/Dermatologic Oncology

## 2022-03-07 ENCOUNTER — CLINICAL SUPPORT (OUTPATIENT)
Dept: DERMATOLOGY | Facility: CLINIC | Age: 62
End: 2022-03-07
Payer: COMMERCIAL

## 2022-03-07 DIAGNOSIS — Z48.89 ENCOUNTER FOR POST SURGICAL WOUND CHECK: Primary | ICD-10-CM

## 2022-03-07 NOTE — PROGRESS NOTES
Pt is here for 1 week post Mohs SR. Mohs on 2/28/2022   Incision site is healing well. No signs of infection   RTC in 6 weeks for wound check  Danuta Gamez,CMA

## 2022-04-07 ENCOUNTER — TELEPHONE (OUTPATIENT)
Dept: CARDIOLOGY | Facility: CLINIC | Age: 62
End: 2022-04-07
Payer: COMMERCIAL

## 2022-04-07 ENCOUNTER — OFFICE VISIT (OUTPATIENT)
Dept: FAMILY MEDICINE | Facility: CLINIC | Age: 62
End: 2022-04-07
Payer: COMMERCIAL

## 2022-04-07 VITALS
BODY MASS INDEX: 47.09 KG/M2 | HEART RATE: 59 BPM | HEIGHT: 66 IN | WEIGHT: 293 LBS | OXYGEN SATURATION: 99 % | TEMPERATURE: 98 F | DIASTOLIC BLOOD PRESSURE: 87 MMHG | RESPIRATION RATE: 18 BRPM | SYSTOLIC BLOOD PRESSURE: 139 MMHG

## 2022-04-07 DIAGNOSIS — E66.01 CLASS 3 SEVERE OBESITY WITH BODY MASS INDEX (BMI) OF 45.0 TO 49.9 IN ADULT, UNSPECIFIED OBESITY TYPE, UNSPECIFIED WHETHER SERIOUS COMORBIDITY PRESENT: ICD-10-CM

## 2022-04-07 DIAGNOSIS — I10 HYPERTENSION, UNSPECIFIED TYPE: Primary | ICD-10-CM

## 2022-04-07 DIAGNOSIS — J45.909 ASTHMA, UNSPECIFIED ASTHMA SEVERITY, UNSPECIFIED WHETHER COMPLICATED, UNSPECIFIED WHETHER PERSISTENT: ICD-10-CM

## 2022-04-07 DIAGNOSIS — K21.9 GASTROESOPHAGEAL REFLUX DISEASE, UNSPECIFIED WHETHER ESOPHAGITIS PRESENT: ICD-10-CM

## 2022-04-07 DIAGNOSIS — E78.5 HYPERLIPIDEMIA, UNSPECIFIED HYPERLIPIDEMIA TYPE: ICD-10-CM

## 2022-04-07 DIAGNOSIS — I48.91 ATRIAL FIBRILLATION, UNSPECIFIED TYPE: ICD-10-CM

## 2022-04-07 DIAGNOSIS — R60.9 EDEMA, UNSPECIFIED TYPE: ICD-10-CM

## 2022-04-07 DIAGNOSIS — R00.2 PALPITATIONS: ICD-10-CM

## 2022-04-07 PROCEDURE — 99214 PR OFFICE/OUTPT VISIT, EST, LEVL IV, 30-39 MIN: ICD-10-PCS | Mod: ,,, | Performed by: NURSE PRACTITIONER

## 2022-04-07 PROCEDURE — 1159F PR MEDICATION LIST DOCUMENTED IN MEDICAL RECORD: ICD-10-PCS | Mod: CPTII,,, | Performed by: NURSE PRACTITIONER

## 2022-04-07 PROCEDURE — 93000 ELECTROCARDIOGRAM COMPLETE: CPT | Mod: ,,, | Performed by: NURSE PRACTITIONER

## 2022-04-07 PROCEDURE — 3008F PR BODY MASS INDEX (BMI) DOCUMENTED: ICD-10-PCS | Mod: CPTII,,, | Performed by: NURSE PRACTITIONER

## 2022-04-07 PROCEDURE — 3008F BODY MASS INDEX DOCD: CPT | Mod: CPTII,,, | Performed by: NURSE PRACTITIONER

## 2022-04-07 PROCEDURE — 4010F ACE/ARB THERAPY RXD/TAKEN: CPT | Mod: CPTII,,, | Performed by: NURSE PRACTITIONER

## 2022-04-07 PROCEDURE — 4010F PR ACE/ARB THEARPY RXD/TAKEN: ICD-10-PCS | Mod: CPTII,,, | Performed by: NURSE PRACTITIONER

## 2022-04-07 PROCEDURE — 3075F SYST BP GE 130 - 139MM HG: CPT | Mod: CPTII,,, | Performed by: NURSE PRACTITIONER

## 2022-04-07 PROCEDURE — 1159F MED LIST DOCD IN RCRD: CPT | Mod: CPTII,,, | Performed by: NURSE PRACTITIONER

## 2022-04-07 PROCEDURE — 3079F PR MOST RECENT DIASTOLIC BLOOD PRESSURE 80-89 MM HG: ICD-10-PCS | Mod: CPTII,,, | Performed by: NURSE PRACTITIONER

## 2022-04-07 PROCEDURE — 93000 PR ELECTROCARDIOGRAM, COMPLETE: ICD-10-PCS | Mod: ,,, | Performed by: NURSE PRACTITIONER

## 2022-04-07 PROCEDURE — 3075F PR MOST RECENT SYSTOLIC BLOOD PRESS GE 130-139MM HG: ICD-10-PCS | Mod: CPTII,,, | Performed by: NURSE PRACTITIONER

## 2022-04-07 PROCEDURE — 1160F PR REVIEW ALL MEDS BY PRESCRIBER/CLIN PHARMACIST DOCUMENTED: ICD-10-PCS | Mod: CPTII,,, | Performed by: NURSE PRACTITIONER

## 2022-04-07 PROCEDURE — 1160F RVW MEDS BY RX/DR IN RCRD: CPT | Mod: CPTII,,, | Performed by: NURSE PRACTITIONER

## 2022-04-07 PROCEDURE — 99214 OFFICE O/P EST MOD 30 MIN: CPT | Mod: ,,, | Performed by: NURSE PRACTITIONER

## 2022-04-07 PROCEDURE — 3079F DIAST BP 80-89 MM HG: CPT | Mod: CPTII,,, | Performed by: NURSE PRACTITIONER

## 2022-04-07 RX ORDER — CLONIDINE HYDROCHLORIDE 0.1 MG/1
0.1 TABLET ORAL ONCE
Qty: 90 TABLET | Refills: 1 | Status: SHIPPED | OUTPATIENT
Start: 2022-04-07 | End: 2022-09-28 | Stop reason: SDUPTHER

## 2022-04-07 RX ORDER — CARVEDILOL 12.5 MG/1
TABLET ORAL
Qty: 180 TABLET | Refills: 1 | Status: SHIPPED | OUTPATIENT
Start: 2022-04-07 | End: 2022-09-28 | Stop reason: SDUPTHER

## 2022-04-07 RX ORDER — FUROSEMIDE 20 MG/1
20 TABLET ORAL DAILY
Qty: 90 TABLET | Refills: 1 | Status: SHIPPED | OUTPATIENT
Start: 2022-04-07 | End: 2023-04-24 | Stop reason: SDUPTHER

## 2022-04-07 RX ORDER — ALBUTEROL SULFATE 90 UG/1
2 AEROSOL, METERED RESPIRATORY (INHALATION) EVERY 4 HOURS PRN
Qty: 18 G | Refills: 5 | Status: SHIPPED | OUTPATIENT
Start: 2022-04-07 | End: 2022-09-28 | Stop reason: SDUPTHER

## 2022-04-07 RX ORDER — BUDESONIDE AND FORMOTEROL FUMARATE DIHYDRATE 160; 4.5 UG/1; UG/1
4 AEROSOL RESPIRATORY (INHALATION) 4 TIMES DAILY PRN
COMMUNITY
Start: 2021-12-17 | End: 2022-04-07 | Stop reason: SDUPTHER

## 2022-04-07 RX ORDER — PANTOPRAZOLE SODIUM 40 MG/1
40 TABLET, DELAYED RELEASE ORAL DAILY
Qty: 90 TABLET | Refills: 1 | Status: SHIPPED | OUTPATIENT
Start: 2022-04-07 | End: 2022-09-28 | Stop reason: SDUPTHER

## 2022-04-07 RX ORDER — OLMESARTAN MEDOXOMIL 40 MG/1
40 TABLET ORAL DAILY
Qty: 90 TABLET | Refills: 1 | Status: SHIPPED | OUTPATIENT
Start: 2022-04-07 | End: 2022-09-28 | Stop reason: SDUPTHER

## 2022-04-07 RX ORDER — BUDESONIDE AND FORMOTEROL FUMARATE DIHYDRATE 160; 4.5 UG/1; UG/1
4 AEROSOL RESPIRATORY (INHALATION) EVERY 12 HOURS
Qty: 10.2 G | Refills: 5 | Status: SHIPPED | OUTPATIENT
Start: 2022-04-07 | End: 2022-09-28 | Stop reason: SDUPTHER

## 2022-04-07 NOTE — TELEPHONE ENCOUNTER
Clinic nurse for Dr. Henderson called. EKG abnormal for patient. Wanted to see if Dr. Marcos could see patient as this may be new finding. Dr. Marcos is not in clinic today-in cath lab.   Requested EKG be faxed to clinic and sending Dr. Marcos a message.

## 2022-04-07 NOTE — PROGRESS NOTES
Subjective:       Patient ID: Kat Nam is a 61 y.o. female.    Chief Complaint: Medication Refill    Ms. Nam presents to clinic in follow up for hypertension, HLD (intolerant of statin), GERD, last seen over one year ago. She reports that she was unable to get Dexilant due to insurance restraints and requests a different PPI. She reports mild asthma and notes she only takes Symbicort as needed. She was unable to tolerate statin previously, confirms family history of heart disease. She is taking CoQ10, prefers not to treat with statin and has a h/o palpitations. She was previously followed by Dr. Trejo at [facility], but states she changed to [facility] due to a change in insurance. She is not up to date on mammogram or colonoscopy, discussed cologuard option today, pt. Defers.    Review of Systems   Constitutional: Negative.    Respiratory: Positive for shortness of breath (asthma).    Cardiovascular: Positive for palpitations. Negative for chest pain, leg swelling and claudication.   Gastrointestinal: Negative.    Genitourinary: Negative.    Musculoskeletal: Negative.    Neurological: Negative.    Psychiatric/Behavioral: Negative.          Objective:      Physical Exam  Vitals and nursing note reviewed.   Constitutional:       Appearance: Normal appearance. She is obese.   HENT:      Head: Normocephalic.   Eyes:      Conjunctiva/sclera: Conjunctivae normal.      Pupils: Pupils are equal, round, and reactive to light.   Cardiovascular:      Rate and Rhythm: Normal rate. Rhythm irregular.      Pulses: Normal pulses.   Pulmonary:      Effort: Pulmonary effort is normal.      Breath sounds: Normal breath sounds.   Abdominal:      General: Bowel sounds are normal.      Palpations: Abdomen is soft.   Musculoskeletal:      Cervical back: Normal range of motion and neck supple.   Skin:     General: Skin is warm and dry.   Neurological:      Mental Status: She is alert and oriented to person, place, and time.   Psychiatric:          Behavior: Behavior normal.         Assessment:       Problem List Items Addressed This Visit    None     Visit Diagnoses     Hypertension, unspecified type    -  Primary    Relevant Medications    cloNIDine (CATAPRES) 0.1 MG tablet    carvediloL (COREG) 12.5 MG tablet    olmesartan (BENICAR) 40 MG tablet    Other Relevant Orders    Comprehensive Metabolic Panel    CBC Auto Differential    Edema, unspecified type        Relevant Medications    furosemide (LASIX) 20 MG tablet    Hyperlipidemia, unspecified hyperlipidemia type        Relevant Orders    Comprehensive Metabolic Panel    Magnesium    Lipid Panel    Gastroesophageal reflux disease, unspecified whether esophagitis present        Relevant Orders    Magnesium    Palpitations        Relevant Orders    POCT EKG 12-LEAD (NOT FOR [facility] USE)    Class 3 severe obesity with body mass index (BMI) of 45.0 to 49.9 in adult, unspecified obesity type, unspecified whether serious comorbidity present        Asthma, unspecified asthma severity, unspecified whether complicated, unspecified whether persistent        Atrial fibrillation, unspecified type              Plan:        1. Blood pressure controlled, due for fasting labs today but is not fasting. Prefers not to treat with statin.   2. EKG - rate 61bpm, atrial fibrillation, lat ST changes - no prior diagnosis. Contacted cardiology to review EKG and to get appt., she states she was seen by Dr. Marcos in the past.              Chadsvasc score 2, samples of eliquis 5mg given to patient to take BID starting today, go to ER if you develop symptoms of chest pain, palpitations, dyspnea as patient defers ER evaluation.   3. Stop Dexilant, start Protonix 40mg QD.    4. We will notify you of cardiology appt, follow up at this clinic in one week.

## 2022-04-08 ENCOUNTER — OFFICE VISIT (OUTPATIENT)
Dept: CARDIOLOGY | Facility: CLINIC | Age: 62
End: 2022-04-08
Payer: COMMERCIAL

## 2022-04-08 VITALS
HEIGHT: 66 IN | OXYGEN SATURATION: 97 % | RESPIRATION RATE: 17 BRPM | DIASTOLIC BLOOD PRESSURE: 86 MMHG | WEIGHT: 293 LBS | SYSTOLIC BLOOD PRESSURE: 136 MMHG | HEART RATE: 69 BPM | BODY MASS INDEX: 47.09 KG/M2

## 2022-04-08 DIAGNOSIS — I10 HYPERTENSION, UNSPECIFIED TYPE: Primary | ICD-10-CM

## 2022-04-08 DIAGNOSIS — R00.2 PALPITATIONS: ICD-10-CM

## 2022-04-08 DIAGNOSIS — I49.1 PAC (PREMATURE ATRIAL CONTRACTION): ICD-10-CM

## 2022-04-08 LAB
ALBUMIN SERPL BCP-MCNC: 3.7 G/DL (ref 3.5–5)
ALBUMIN/GLOB SERPL: 1.1 {RATIO}
ALP SERPL-CCNC: 100 U/L (ref 50–130)
ALT SERPL W P-5'-P-CCNC: 16 U/L (ref 13–56)
ANION GAP SERPL CALCULATED.3IONS-SCNC: 8 MMOL/L (ref 7–16)
AST SERPL W P-5'-P-CCNC: 11 U/L (ref 15–37)
BASOPHILS # BLD AUTO: 0.05 K/UL (ref 0–0.2)
BASOPHILS NFR BLD AUTO: 0.6 % (ref 0–1)
BILIRUB SERPL-MCNC: 0.3 MG/DL (ref 0–1.2)
BUN SERPL-MCNC: 20 MG/DL (ref 7–18)
BUN/CREAT SERPL: 19 (ref 6–20)
CALCIUM SERPL-MCNC: 8.6 MG/DL (ref 8.5–10.1)
CHLORIDE SERPL-SCNC: 106 MMOL/L (ref 98–107)
CHOLEST SERPL-MCNC: 190 MG/DL (ref 0–200)
CHOLEST/HDLC SERPL: 3.7 {RATIO}
CO2 SERPL-SCNC: 27 MMOL/L (ref 21–32)
CREAT SERPL-MCNC: 1.04 MG/DL (ref 0.55–1.02)
DIFFERENTIAL METHOD BLD: ABNORMAL
EOSINOPHIL # BLD AUTO: 0.38 K/UL (ref 0–0.5)
EOSINOPHIL NFR BLD AUTO: 4.9 % (ref 1–4)
ERYTHROCYTE [DISTWIDTH] IN BLOOD BY AUTOMATED COUNT: 13.6 % (ref 11.5–14.5)
GLOBULIN SER-MCNC: 3.5 G/DL (ref 2–4)
GLUCOSE SERPL-MCNC: 93 MG/DL (ref 74–106)
HCT VFR BLD AUTO: 38.5 % (ref 38–47)
HDLC SERPL-MCNC: 51 MG/DL (ref 40–60)
HGB BLD-MCNC: 12 G/DL (ref 12–16)
IMM GRANULOCYTES # BLD AUTO: 0.02 K/UL (ref 0–0.04)
IMM GRANULOCYTES NFR BLD: 0.3 % (ref 0–0.4)
LDLC SERPL CALC-MCNC: 118 MG/DL
LDLC/HDLC SERPL: 2.3 {RATIO}
LYMPHOCYTES # BLD AUTO: 1.62 K/UL (ref 1–4.8)
LYMPHOCYTES NFR BLD AUTO: 20.7 % (ref 27–41)
MAGNESIUM SERPL-MCNC: 2 MG/DL (ref 1.7–2.3)
MCH RBC QN AUTO: 28.4 PG (ref 27–31)
MCHC RBC AUTO-ENTMCNC: 31.2 G/DL (ref 32–36)
MCV RBC AUTO: 91 FL (ref 80–96)
MONOCYTES # BLD AUTO: 0.48 K/UL (ref 0–0.8)
MONOCYTES NFR BLD AUTO: 6.1 % (ref 2–6)
MPC BLD CALC-MCNC: 12.1 FL (ref 9.4–12.4)
NEUTROPHILS # BLD AUTO: 5.26 K/UL (ref 1.8–7.7)
NEUTROPHILS NFR BLD AUTO: 67.4 % (ref 53–65)
NONHDLC SERPL-MCNC: 139 MG/DL
NRBC # BLD AUTO: 0 X10E3/UL
NRBC, AUTO (.00): 0 %
PLATELET # BLD AUTO: 277 K/UL (ref 150–400)
POTASSIUM SERPL-SCNC: 4.1 MMOL/L (ref 3.5–5.1)
PROT SERPL-MCNC: 7.2 G/DL (ref 6.4–8.2)
RBC # BLD AUTO: 4.23 M/UL (ref 4.2–5.4)
SODIUM SERPL-SCNC: 137 MMOL/L (ref 136–145)
TRIGL SERPL-MCNC: 106 MG/DL (ref 35–150)
VLDLC SERPL-MCNC: 21 MG/DL
WBC # BLD AUTO: 7.81 K/UL (ref 4.5–11)

## 2022-04-08 PROCEDURE — 3008F BODY MASS INDEX DOCD: CPT | Mod: CPTII,,, | Performed by: INTERNAL MEDICINE

## 2022-04-08 PROCEDURE — 3075F PR MOST RECENT SYSTOLIC BLOOD PRESS GE 130-139MM HG: ICD-10-PCS | Mod: CPTII,,, | Performed by: INTERNAL MEDICINE

## 2022-04-08 PROCEDURE — 4010F PR ACE/ARB THEARPY RXD/TAKEN: ICD-10-PCS | Mod: CPTII,,, | Performed by: INTERNAL MEDICINE

## 2022-04-08 PROCEDURE — 80061 LIPID PANEL: ICD-10-PCS | Mod: ,,, | Performed by: CLINICAL MEDICAL LABORATORY

## 2022-04-08 PROCEDURE — 80053 COMPREHEN METABOLIC PANEL: CPT | Mod: ,,, | Performed by: CLINICAL MEDICAL LABORATORY

## 2022-04-08 PROCEDURE — 1160F PR REVIEW ALL MEDS BY PRESCRIBER/CLIN PHARMACIST DOCUMENTED: ICD-10-PCS | Mod: CPTII,,, | Performed by: INTERNAL MEDICINE

## 2022-04-08 PROCEDURE — 3079F DIAST BP 80-89 MM HG: CPT | Mod: CPTII,,, | Performed by: INTERNAL MEDICINE

## 2022-04-08 PROCEDURE — 83735 MAGNESIUM: ICD-10-PCS | Mod: ,,, | Performed by: CLINICAL MEDICAL LABORATORY

## 2022-04-08 PROCEDURE — 4010F ACE/ARB THERAPY RXD/TAKEN: CPT | Mod: CPTII,,, | Performed by: INTERNAL MEDICINE

## 2022-04-08 PROCEDURE — 1159F MED LIST DOCD IN RCRD: CPT | Mod: CPTII,,, | Performed by: INTERNAL MEDICINE

## 2022-04-08 PROCEDURE — 83735 ASSAY OF MAGNESIUM: CPT | Mod: ,,, | Performed by: CLINICAL MEDICAL LABORATORY

## 2022-04-08 PROCEDURE — 99213 OFFICE O/P EST LOW 20 MIN: CPT | Mod: PBBFAC | Performed by: INTERNAL MEDICINE

## 2022-04-08 PROCEDURE — 93005 ELECTROCARDIOGRAM TRACING: CPT | Mod: PBBFAC | Performed by: INTERNAL MEDICINE

## 2022-04-08 PROCEDURE — 99213 OFFICE O/P EST LOW 20 MIN: CPT | Mod: S$PBB,,, | Performed by: INTERNAL MEDICINE

## 2022-04-08 PROCEDURE — 3075F SYST BP GE 130 - 139MM HG: CPT | Mod: CPTII,,, | Performed by: INTERNAL MEDICINE

## 2022-04-08 PROCEDURE — 1160F RVW MEDS BY RX/DR IN RCRD: CPT | Mod: CPTII,,, | Performed by: INTERNAL MEDICINE

## 2022-04-08 PROCEDURE — 93010 ELECTROCARDIOGRAM REPORT: CPT | Mod: S$PBB,,, | Performed by: INTERNAL MEDICINE

## 2022-04-08 PROCEDURE — 99213 PR OFFICE/OUTPT VISIT, EST, LEVL III, 20-29 MIN: ICD-10-PCS | Mod: S$PBB,,, | Performed by: INTERNAL MEDICINE

## 2022-04-08 PROCEDURE — 93010 EKG 12-LEAD: ICD-10-PCS | Mod: S$PBB,,, | Performed by: INTERNAL MEDICINE

## 2022-04-08 PROCEDURE — 85025 CBC WITH DIFFERENTIAL: ICD-10-PCS | Mod: ,,, | Performed by: CLINICAL MEDICAL LABORATORY

## 2022-04-08 PROCEDURE — 3008F PR BODY MASS INDEX (BMI) DOCUMENTED: ICD-10-PCS | Mod: CPTII,,, | Performed by: INTERNAL MEDICINE

## 2022-04-08 PROCEDURE — 85025 COMPLETE CBC W/AUTO DIFF WBC: CPT | Mod: ,,, | Performed by: CLINICAL MEDICAL LABORATORY

## 2022-04-08 PROCEDURE — 80053 COMPREHENSIVE METABOLIC PANEL: ICD-10-PCS | Mod: ,,, | Performed by: CLINICAL MEDICAL LABORATORY

## 2022-04-08 PROCEDURE — 80061 LIPID PANEL: CPT | Mod: ,,, | Performed by: CLINICAL MEDICAL LABORATORY

## 2022-04-08 PROCEDURE — 1159F PR MEDICATION LIST DOCUMENTED IN MEDICAL RECORD: ICD-10-PCS | Mod: CPTII,,, | Performed by: INTERNAL MEDICINE

## 2022-04-08 PROCEDURE — 3079F PR MOST RECENT DIASTOLIC BLOOD PRESSURE 80-89 MM HG: ICD-10-PCS | Mod: CPTII,,, | Performed by: INTERNAL MEDICINE

## 2022-04-08 NOTE — PROGRESS NOTES
PCP: Bony Mary MD    Referring Provider:     Subjective:   Kat Nam is a 61 y.o. female, nonsmoker, with hx of HTn, HLD, GERD, who presents for evaluation of abnormal EKG showing very frequent PACs with bigeminy.    She states she has palpitations and quivering associated with shortness of breath for the last many years.  She reports shortness of breath with exertion.  She is on Coreg.  She has had palpitations for about 10 yrs.      Fam hx of premature CAD in mom and dad.     CTV 2020:  30-32% narrowing of the left common iliac vein as it passes dorsal to the right common iliac artery, significance uncertain                         No deep venous thrombosis of the pelvic veins the shunt. No acute process otherwise as detailed above     EKG 22:  Sinus rhythm with blocked premature atrial complexes.   ECHO 2020:  Ejection fraction  55-60(%). Mild pulmonic regurgitation       Past Surgical History:   Procedure Laterality Date    ARTHROSCOPY OF KNEE Left 2021    Procedure: ARTHROSCOPY, KNEE;  Surgeon: Noman Huerta MD;  Location: [facility];  Service: Orthopedics;  Laterality: Left;     SECTION      CHOLECYSTECTOMY      EXCISION OF MEDIAL MENISCUS OF KNEE Left 2021    Procedure: MENISCECTOMY, KNEE, MEDIAL;  Surgeon: Noman Huerta MD;  Location: [facility];  Service: Orthopedics;  Laterality: Left;    HYSTERECTOMY      KNEE ARTHROSCOPY W/ MENISCECTOMY Left 2021    Procedure: ARTHROSCOPY, KNEE, WITH MENISCECTOMY;  Surgeon: Noman Huerta MD;  Location: [facility];  Service: Orthopedics;  Laterality: Left;  LATERAL MENISECTOMY    SPINE SURGERY      Lumbar Laminectomy, L4-L5      Family History   Problem Relation Age of Onset    Diabetes Mellitus Mother     Heart disease Father     Hypertension Father     Diabetes Mellitus Sister     Diabetes Mellitus Brother     Heart disease Brother       Social History     Socioeconomic History     Marital status:    Tobacco Use    Smoking status: Never Smoker    Smokeless tobacco: Never Used   Substance and Sexual Activity    Alcohol use: Never    Drug use: Never           Lab Results   Component Value Date     04/08/2022    K 4.1 04/08/2022     04/08/2022    CO2 27 04/08/2022    BUN 20 (H) 04/08/2022    CREATININE 1.04 (H) 04/08/2022    CALCIUM 8.6 04/08/2022    ANIONGAP 8 04/08/2022    ESTGFRAFRICA 74 03/25/2021    EGFRNONAA 57 (L) 04/08/2022       Lab Results   Component Value Date    CHOL 190 04/08/2022    CHOL 217 03/25/2021    CHOL 196 11/16/2020     Lab Results   Component Value Date    HDL 51 04/08/2022    HDL 62 03/25/2021    HDL 62 11/16/2020     Lab Results   Component Value Date    LDLCALC 118 04/08/2022    LDLCALC 132 03/25/2021    LDLCALC 107 11/16/2020     Lab Results   Component Value Date    TRIG 106 04/08/2022    TRIG 117 03/25/2021    TRIG 134 11/16/2020     Lab Results   Component Value Date    CHOLHDL 3.7 04/08/2022    CHOLHDL 3.2 11/16/2020       Lab Results   Component Value Date    WBC 7.81 04/08/2022    HGB 12.0 04/08/2022    HCT 38.5 04/08/2022    MCV 91.0 04/08/2022     04/08/2022           Current Outpatient Medications:     albuterol (PROVENTIL/VENTOLIN HFA) 90 mcg/actuation inhaler, Inhale 2 puffs into the lungs every 4 (four) hours as needed for Wheezing. Rescue, Disp: 18 g, Rfl: 5    apixaban (ELIQUIS) 5 mg Tab, Take 5 mg by mouth 2 (two) times daily., Disp: , Rfl:     carvediloL (COREG) 12.5 MG tablet, TAKE ONE (1) TABLET BY MOUTH TWO (2) (TWO) TIMES DAILY WITH MEALS., Disp: 180 tablet, Rfl: 1    furosemide (LASIX) 20 MG tablet, Take 1 tablet (20 mg total) by mouth once daily., Disp: 90 tablet, Rfl: 1    olmesartan (BENICAR) 40 MG tablet, Take 1 tablet (40 mg total) by mouth once daily., Disp: 90 tablet, Rfl: 1    pantoprazole (PROTONIX) 40 MG tablet, Take 1 tablet (40 mg total) by mouth once daily., Disp: 90 tablet, Rfl: 1    SYMBICORT  "160-4.5 mcg/actuation HFAA, Inhale 4 puffs into the lungs every 12 (twelve) hours., Disp: 10.2 g, Rfl: 5    cloNIDine (CATAPRES) 0.1 MG tablet, Take 1 tablet (0.1 mg total) by mouth once. for 1 dose, Disp: 90 tablet, Rfl: 1       Review of Systems   Respiratory: Negative for cough and shortness of breath.    Cardiovascular: Negative for chest pain, palpitations, orthopnea, claudication, leg swelling and PND.         Objective:   /86   Pulse 69   Resp 17   Ht 5' 6" (1.676 m)   Wt 136.1 kg (300 lb)   SpO2 97%   BMI 48.42 kg/m²     Physical Exam  Constitutional:       General: She is not in acute distress.  Eyes:      Extraocular Movements: Extraocular movements intact.   Cardiovascular:      Rate and Rhythm: Normal rate and regular rhythm.      Pulses: Normal pulses.      Heart sounds: Normal heart sounds. No murmur heard.  Pulmonary:      Effort: Pulmonary effort is normal.      Breath sounds: Normal breath sounds.   Abdominal:      Palpations: Abdomen is soft.   Musculoskeletal:         General: No swelling.      Cervical back: Neck supple.   Skin:     Findings: No rash.   Neurological:      Mental Status: She is alert and oriented to person, place, and time.           Assessment:     1. Hypertension, unspecified type  EKG 12-lead    EKG 12-lead   2. Palpitations  Holter monitor - 24 hour   3. PAC (premature atrial contraction)           Plan:     Problem List Items Addressed This Visit        Cardiac/Vascular    PAC (premature atrial contraction)      Other Visit Diagnoses     Hypertension, unspecified type    -  Primary    Relevant Orders    EKG 12-lead    Palpitations        Relevant Orders    Holter monitor - 24 hour         #Palpitations   Echo and stress test were done in early 2020 which were both unremarkable.  EKG done yesterday and in office today shows very frequent PACs, atrial bigeminy  Patient is otherwise asymptomatic, denies any dizziness or syncope.  She does feel her heart quiver " however denies any fast heart rates.    Will get a Holter monitor to assess her PAC burden.  At this point she does not have any other symptoms, which is continue giving her carvedilol.  If she becomes symptomatic can consider EP evaluation.  Holter monitor   Discontinue Eliquis, no evidence of atrial fibrillation.      Please follow-up in 6 months, will call her sooner if the Holter monitor is concerning.

## 2022-04-11 ENCOUNTER — HOSPITAL ENCOUNTER (OUTPATIENT)
Dept: CARDIOLOGY | Facility: HOSPITAL | Age: 62
Discharge: HOME | End: 2022-04-11
Attending: INTERNAL MEDICINE
Payer: COMMERCIAL

## 2022-04-11 ENCOUNTER — PATIENT MESSAGE (OUTPATIENT)
Dept: FAMILY MEDICINE | Facility: CLINIC | Age: 62
End: 2022-04-11
Payer: COMMERCIAL

## 2022-04-11 DIAGNOSIS — R00.2 PALPITATIONS: ICD-10-CM

## 2022-04-11 PROCEDURE — 93226 XTRNL ECG REC<48 HR SCAN A/R: CPT

## 2022-04-14 LAB
OHS CV EVENT MONITOR DAY: 0
OHS CV HOLTER LENGTH DECIMAL HOURS: 24
OHS CV HOLTER LENGTH HOURS: 24
OHS CV HOLTER LENGTH MINUTES: 0
OHS CV HOLTER SINUS AVERAGE HR: 68
OHS CV HOLTER SINUS MAX HR: 132
OHS CV HOLTER SINUS MIN HR: 49

## 2022-04-14 PROCEDURE — 93227 HOLTER MONITOR - 24 HOUR (CUPID ONLY): ICD-10-PCS | Mod: ,,, | Performed by: INTERNAL MEDICINE

## 2022-04-14 PROCEDURE — 93227 XTRNL ECG REC<48 HR R&I: CPT | Mod: ,,, | Performed by: INTERNAL MEDICINE

## 2022-04-21 ENCOUNTER — OFFICE VISIT (OUTPATIENT)
Dept: DERMATOLOGY | Facility: CLINIC | Age: 62
End: 2022-04-21
Payer: COMMERCIAL

## 2022-04-21 DIAGNOSIS — Z48.89 ENCOUNTER FOR POST SURGICAL WOUND CHECK: Primary | ICD-10-CM

## 2022-04-21 PROCEDURE — 1159F MED LIST DOCD IN RCRD: CPT | Mod: CPTII,,, | Performed by: STUDENT IN AN ORGANIZED HEALTH CARE EDUCATION/TRAINING PROGRAM

## 2022-04-21 PROCEDURE — 4010F ACE/ARB THERAPY RXD/TAKEN: CPT | Mod: CPTII,,, | Performed by: STUDENT IN AN ORGANIZED HEALTH CARE EDUCATION/TRAINING PROGRAM

## 2022-04-21 PROCEDURE — 1159F PR MEDICATION LIST DOCUMENTED IN MEDICAL RECORD: ICD-10-PCS | Mod: CPTII,,, | Performed by: STUDENT IN AN ORGANIZED HEALTH CARE EDUCATION/TRAINING PROGRAM

## 2022-04-21 PROCEDURE — 99499 UNLISTED E&M SERVICE: CPT | Mod: ,,, | Performed by: STUDENT IN AN ORGANIZED HEALTH CARE EDUCATION/TRAINING PROGRAM

## 2022-04-21 PROCEDURE — 1160F RVW MEDS BY RX/DR IN RCRD: CPT | Mod: CPTII,,, | Performed by: STUDENT IN AN ORGANIZED HEALTH CARE EDUCATION/TRAINING PROGRAM

## 2022-04-21 PROCEDURE — 1160F PR REVIEW ALL MEDS BY PRESCRIBER/CLIN PHARMACIST DOCUMENTED: ICD-10-PCS | Mod: CPTII,,, | Performed by: STUDENT IN AN ORGANIZED HEALTH CARE EDUCATION/TRAINING PROGRAM

## 2022-04-21 PROCEDURE — 99499 NO LOS: ICD-10-PCS | Mod: ,,, | Performed by: STUDENT IN AN ORGANIZED HEALTH CARE EDUCATION/TRAINING PROGRAM

## 2022-04-21 PROCEDURE — 4010F PR ACE/ARB THEARPY RXD/TAKEN: ICD-10-PCS | Mod: CPTII,,, | Performed by: STUDENT IN AN ORGANIZED HEALTH CARE EDUCATION/TRAINING PROGRAM

## 2022-04-21 NOTE — PROGRESS NOTES
Patient name: Kat Nam  Date: 02/28/2022     Mohs accession #:   Previous dermpath accession #: [id number]  Location: L eyebrow   Preop diagnosis:BCC   Postop diagnosis: Same  Mohs AUC score: 9  Number of stages: 3  Preop size: 0.7 cm x 0.5 cm  Postop size: 1.4 cm x 1.3 cm   Depth of defect: fat   Repair type: advancement flap     Patient is here today for wound check following Mohs.  Incision has healed well. No tenderness to the area  Derma sanding today to smooth edges of the scar.    The area was cleaned with hibiclens and locally infiltrated with 1% lidocaine with epinephrine, ~3 cc total. The area was dermabraded with sterilized 3M medium grit drywall sandpaper until pinpoint bleeding occurred.  The patient tolerated the procedure well without complications. Verbal and written wound care instructions were given.    Jensen Garcia MD   Mohs Surgery/Dermatologic Oncology

## 2022-06-22 ENCOUNTER — OFFICE VISIT (OUTPATIENT)
Dept: FAMILY MEDICINE | Facility: CLINIC | Age: 62
End: 2022-06-22
Payer: COMMERCIAL

## 2022-06-22 VITALS — OXYGEN SATURATION: 92 % | HEART RATE: 83 BPM | TEMPERATURE: 98 F

## 2022-06-22 DIAGNOSIS — R05.9 COUGH: Primary | ICD-10-CM

## 2022-06-22 DIAGNOSIS — R52 BODY ACHES: ICD-10-CM

## 2022-06-22 DIAGNOSIS — R19.7 DIARRHEA, UNSPECIFIED TYPE: ICD-10-CM

## 2022-06-22 DIAGNOSIS — R11.0 NAUSEA: ICD-10-CM

## 2022-06-22 LAB
CTP QC/QA: YES
FLUAV AG NPH QL: NEGATIVE
FLUBV AG NPH QL: NEGATIVE
SARS-COV-2 AG RESP QL IA.RAPID: NEGATIVE

## 2022-06-22 PROCEDURE — 99499 UNLISTED E&M SERVICE: CPT | Mod: ,,, | Performed by: FAMILY MEDICINE

## 2022-06-22 PROCEDURE — 1160F PR REVIEW ALL MEDS BY PRESCRIBER/CLIN PHARMACIST DOCUMENTED: ICD-10-PCS | Mod: CPTII,,, | Performed by: FAMILY MEDICINE

## 2022-06-22 PROCEDURE — 87428 POCT SARS-COV2 (COVID) WITH FLU ANTIGEN: ICD-10-PCS | Mod: QW,,, | Performed by: FAMILY MEDICINE

## 2022-06-22 PROCEDURE — 4010F ACE/ARB THERAPY RXD/TAKEN: CPT | Mod: CPTII,,, | Performed by: FAMILY MEDICINE

## 2022-06-22 PROCEDURE — 4010F PR ACE/ARB THEARPY RXD/TAKEN: ICD-10-PCS | Mod: CPTII,,, | Performed by: FAMILY MEDICINE

## 2022-06-22 PROCEDURE — 1159F MED LIST DOCD IN RCRD: CPT | Mod: CPTII,,, | Performed by: FAMILY MEDICINE

## 2022-06-22 PROCEDURE — 99499 NO LOS: ICD-10-PCS | Mod: ,,, | Performed by: FAMILY MEDICINE

## 2022-06-22 PROCEDURE — 87428 SARSCOV & INF VIR A&B AG IA: CPT | Mod: QW,,, | Performed by: FAMILY MEDICINE

## 2022-06-22 PROCEDURE — 1159F PR MEDICATION LIST DOCUMENTED IN MEDICAL RECORD: ICD-10-PCS | Mod: CPTII,,, | Performed by: FAMILY MEDICINE

## 2022-06-22 PROCEDURE — 1160F RVW MEDS BY RX/DR IN RCRD: CPT | Mod: CPTII,,, | Performed by: FAMILY MEDICINE

## 2022-06-22 RX ORDER — ONDANSETRON 4 MG/1
8 TABLET, FILM COATED ORAL 2 TIMES DAILY
Qty: 30 TABLET | Refills: 0 | Status: ON HOLD | OUTPATIENT
Start: 2022-06-22 | End: 2023-02-07

## 2022-06-22 RX ORDER — ONDANSETRON 4 MG/1
8 TABLET, FILM COATED ORAL 2 TIMES DAILY
COMMUNITY
End: 2022-06-22 | Stop reason: SDUPTHER

## 2022-06-22 NOTE — LETTER
June 22, 2022      [facility]  [address] [place] [address]  Phone: [phone]  Fax: [phone]       Patient: Kat Nam   YOB: 1960  Date of Visit: 06/22/2022    To Whom It May Concern:    Ronny Nam  was at  on 06/22/2022. The patient may return to work 06/24/2022 With/no: restrictions. If you have any questions or concerns, or if I can be of further assistance, please do not hesitate to contact me.    Sincerely,    Shana Best LPN

## 2022-06-24 ENCOUNTER — OFFICE VISIT (OUTPATIENT)
Dept: FAMILY MEDICINE | Facility: CLINIC | Age: 62
End: 2022-06-24
Payer: COMMERCIAL

## 2022-06-24 VITALS
WEIGHT: 293 LBS | RESPIRATION RATE: 18 BRPM | HEIGHT: 66 IN | TEMPERATURE: 98 F | HEART RATE: 61 BPM | OXYGEN SATURATION: 98 % | DIASTOLIC BLOOD PRESSURE: 82 MMHG | SYSTOLIC BLOOD PRESSURE: 160 MMHG | BODY MASS INDEX: 47.09 KG/M2

## 2022-06-24 DIAGNOSIS — J02.9 SORE THROAT: Primary | ICD-10-CM

## 2022-06-24 DIAGNOSIS — R10.9 ABDOMINAL PAIN, UNSPECIFIED ABDOMINAL LOCATION: ICD-10-CM

## 2022-06-24 DIAGNOSIS — R19.7 DIARRHEA, UNSPECIFIED TYPE: ICD-10-CM

## 2022-06-24 LAB
ALBUMIN SERPL BCP-MCNC: 3.8 G/DL (ref 3.5–5)
ALBUMIN/GLOB SERPL: 1.1 {RATIO}
ALP SERPL-CCNC: 101 U/L (ref 50–130)
ALT SERPL W P-5'-P-CCNC: 21 U/L (ref 13–56)
AMYLASE SERPL-CCNC: 37 U/L (ref 25–115)
ANION GAP SERPL CALCULATED.3IONS-SCNC: 12 MMOL/L (ref 7–16)
AST SERPL W P-5'-P-CCNC: 17 U/L (ref 15–37)
BASOPHILS # BLD AUTO: 0.05 K/UL (ref 0–0.2)
BASOPHILS NFR BLD AUTO: 0.8 % (ref 0–1)
BILIRUB SERPL-MCNC: 0.4 MG/DL (ref 0–1.2)
BUN SERPL-MCNC: 15 MG/DL (ref 7–18)
BUN/CREAT SERPL: 14 (ref 6–20)
CALCIUM SERPL-MCNC: 9.2 MG/DL (ref 8.5–10.1)
CHLORIDE SERPL-SCNC: 105 MMOL/L (ref 98–107)
CO2 SERPL-SCNC: 28 MMOL/L (ref 21–32)
CREAT SERPL-MCNC: 1.05 MG/DL (ref 0.55–1.02)
CTP QC/QA: YES
DIFFERENTIAL METHOD BLD: ABNORMAL
EOSINOPHIL # BLD AUTO: 0.42 K/UL (ref 0–0.5)
EOSINOPHIL NFR BLD AUTO: 6.3 % (ref 1–4)
ERYTHROCYTE [DISTWIDTH] IN BLOOD BY AUTOMATED COUNT: 13.5 % (ref 11.5–14.5)
GLOBULIN SER-MCNC: 3.4 G/DL (ref 2–4)
GLUCOSE SERPL-MCNC: 92 MG/DL (ref 74–106)
HCT VFR BLD AUTO: 38.6 % (ref 38–47)
HGB BLD-MCNC: 12.5 G/DL (ref 12–16)
IMM GRANULOCYTES # BLD AUTO: 0.02 K/UL (ref 0–0.04)
IMM GRANULOCYTES NFR BLD: 0.3 % (ref 0–0.4)
LIPASE SERPL-CCNC: 102 U/L (ref 73–393)
LYMPHOCYTES # BLD AUTO: 1.89 K/UL (ref 1–4.8)
LYMPHOCYTES NFR BLD AUTO: 28.5 % (ref 27–41)
MCH RBC QN AUTO: 28.9 PG (ref 27–31)
MCHC RBC AUTO-ENTMCNC: 32.4 G/DL (ref 32–36)
MCV RBC AUTO: 89.1 FL (ref 80–96)
MONOCYTES # BLD AUTO: 0.46 K/UL (ref 0–0.8)
MONOCYTES NFR BLD AUTO: 6.9 % (ref 2–6)
MPC BLD CALC-MCNC: 11.8 FL (ref 9.4–12.4)
NEUTROPHILS # BLD AUTO: 3.79 K/UL (ref 1.8–7.7)
NEUTROPHILS NFR BLD AUTO: 57.2 % (ref 53–65)
NRBC # BLD AUTO: 0 X10E3/UL
NRBC, AUTO (.00): 0 %
PLATELET # BLD AUTO: 271 K/UL (ref 150–400)
POTASSIUM SERPL-SCNC: 4.9 MMOL/L (ref 3.5–5.1)
PROT SERPL-MCNC: 7.2 G/DL (ref 6.4–8.2)
RBC # BLD AUTO: 4.33 M/UL (ref 4.2–5.4)
S PYO RRNA THROAT QL PROBE: NEGATIVE
SODIUM SERPL-SCNC: 140 MMOL/L (ref 136–145)
WBC # BLD AUTO: 6.63 K/UL (ref 4.5–11)

## 2022-06-24 PROCEDURE — 3079F DIAST BP 80-89 MM HG: CPT | Mod: CPTII,,, | Performed by: FAMILY MEDICINE

## 2022-06-24 PROCEDURE — 1159F MED LIST DOCD IN RCRD: CPT | Mod: CPTII,,, | Performed by: FAMILY MEDICINE

## 2022-06-24 PROCEDURE — 3008F BODY MASS INDEX DOCD: CPT | Mod: CPTII,,, | Performed by: FAMILY MEDICINE

## 2022-06-24 PROCEDURE — 1160F RVW MEDS BY RX/DR IN RCRD: CPT | Mod: CPTII,,, | Performed by: FAMILY MEDICINE

## 2022-06-24 PROCEDURE — 85025 CBC WITH DIFFERENTIAL: ICD-10-PCS | Mod: ,,, | Performed by: CLINICAL MEDICAL LABORATORY

## 2022-06-24 PROCEDURE — 3079F PR MOST RECENT DIASTOLIC BLOOD PRESSURE 80-89 MM HG: ICD-10-PCS | Mod: CPTII,,, | Performed by: FAMILY MEDICINE

## 2022-06-24 PROCEDURE — 87880 STREP A ASSAY W/OPTIC: CPT | Mod: QW,,, | Performed by: FAMILY MEDICINE

## 2022-06-24 PROCEDURE — 80053 COMPREHEN METABOLIC PANEL: CPT | Mod: ,,, | Performed by: CLINICAL MEDICAL LABORATORY

## 2022-06-24 PROCEDURE — 82150 ASSAY OF AMYLASE: CPT | Mod: ,,, | Performed by: CLINICAL MEDICAL LABORATORY

## 2022-06-24 PROCEDURE — 3077F SYST BP >= 140 MM HG: CPT | Mod: CPTII,,, | Performed by: FAMILY MEDICINE

## 2022-06-24 PROCEDURE — 1160F PR REVIEW ALL MEDS BY PRESCRIBER/CLIN PHARMACIST DOCUMENTED: ICD-10-PCS | Mod: CPTII,,, | Performed by: FAMILY MEDICINE

## 2022-06-24 PROCEDURE — 83690 LIPASE: ICD-10-PCS | Mod: ,,, | Performed by: CLINICAL MEDICAL LABORATORY

## 2022-06-24 PROCEDURE — 99213 OFFICE O/P EST LOW 20 MIN: CPT | Mod: ,,, | Performed by: FAMILY MEDICINE

## 2022-06-24 PROCEDURE — 82150 AMYLASE: ICD-10-PCS | Mod: ,,, | Performed by: CLINICAL MEDICAL LABORATORY

## 2022-06-24 PROCEDURE — 1159F PR MEDICATION LIST DOCUMENTED IN MEDICAL RECORD: ICD-10-PCS | Mod: CPTII,,, | Performed by: FAMILY MEDICINE

## 2022-06-24 PROCEDURE — 3008F PR BODY MASS INDEX (BMI) DOCUMENTED: ICD-10-PCS | Mod: CPTII,,, | Performed by: FAMILY MEDICINE

## 2022-06-24 PROCEDURE — 4010F ACE/ARB THERAPY RXD/TAKEN: CPT | Mod: CPTII,,, | Performed by: FAMILY MEDICINE

## 2022-06-24 PROCEDURE — 83690 ASSAY OF LIPASE: CPT | Mod: ,,, | Performed by: CLINICAL MEDICAL LABORATORY

## 2022-06-24 PROCEDURE — 80053 COMPREHENSIVE METABOLIC PANEL: ICD-10-PCS | Mod: ,,, | Performed by: CLINICAL MEDICAL LABORATORY

## 2022-06-24 PROCEDURE — 4010F PR ACE/ARB THEARPY RXD/TAKEN: ICD-10-PCS | Mod: CPTII,,, | Performed by: FAMILY MEDICINE

## 2022-06-24 PROCEDURE — 85025 COMPLETE CBC W/AUTO DIFF WBC: CPT | Mod: ,,, | Performed by: CLINICAL MEDICAL LABORATORY

## 2022-06-24 PROCEDURE — 99213 PR OFFICE/OUTPT VISIT, EST, LEVL III, 20-29 MIN: ICD-10-PCS | Mod: ,,, | Performed by: FAMILY MEDICINE

## 2022-06-24 PROCEDURE — 87880 POCT RAPID STREP A: ICD-10-PCS | Mod: QW,,, | Performed by: FAMILY MEDICINE

## 2022-06-24 PROCEDURE — 3077F PR MOST RECENT SYSTOLIC BLOOD PRESSURE >= 140 MM HG: ICD-10-PCS | Mod: CPTII,,, | Performed by: FAMILY MEDICINE

## 2022-06-24 NOTE — PROGRESS NOTES
Subjective:       Patient ID: Kat Nam is a 61 y.o. female.    Chief Complaint: Cough, Nasal Congestion, Muscle Pain (Cramps in legs), and Diarrhea    Kat Nam is a 61 year old female with a past medical history of hypertension, hyperlipidemia and GERD who presents to the clinic today with fatigue. She reports 6 days ago she started experiencing body aches, fatigue, chills, headache, occasional dry cough, diarrhea and abdominal pain. She states the diarrhea was black and watery, occurring every 30 to 45 minutes for three days and now has become less watery since she has started taking imodium but is still black, and occasionally has bright red streaking which she attributes to her hemorrhoids. She also reports severe burning abdominal pain which is a 10 out of 10 on the pain scale, that is worsened by eating and is relieved with pepto-bismol. She has associated sore throat, nausea without vomiting, anorexia and reports a 9 pound weight loss in the last week. Today her abdominal pain is a 2 out of 10 on the pain scale, however she is still experiencing fatigue, chills, sore throat and anorexia. She denies sick contacts or recent travel. She denies chest pain, chest congestion, rhinorrhea, otalgia, otorrhea, eye pain, eye discharge, dizziness, lightheadedness or syncope.    Cough  Associated symptoms include chills, ear pain, a fever, headaches, myalgias, postnasal drip and a sore throat. Pertinent negatives include no chest pain, rhinorrhea, shortness of breath or wheezing.   Muscle Pain  Associated symptoms include abdominal pain, chills, coughing, fatigue, a fever, headaches, myalgias, nausea and a sore throat. Pertinent negatives include no arthralgias, chest pain or vomiting.   Diarrhea   Associated symptoms include abdominal pain, chills, coughing, a fever, headaches and myalgias. Pertinent negatives include no arthralgias or vomiting.     Review of Systems   Constitutional: Positive for appetite  change (anorexia), chills, fatigue, fever and unexpected weight change (9 lb weight loss this week).   HENT: Positive for ear pain, postnasal drip and sore throat. Negative for ear discharge and rhinorrhea.    Eyes: Negative for pain and discharge.   Respiratory: Positive for cough. Negative for chest tightness, shortness of breath and wheezing.    Cardiovascular: Negative for chest pain and palpitations.   Gastrointestinal: Positive for abdominal distention, abdominal pain, blood in stool (patient has a history of hemorrhoids ), diarrhea (black watery stool) and nausea. Negative for vomiting.   Genitourinary: Negative for difficulty urinating, dysuria, flank pain, frequency and urgency.   Musculoskeletal: Positive for myalgias. Negative for arthralgias.   Neurological: Positive for headaches. Negative for dizziness, syncope and light-headedness.         Objective:      Physical Exam  Constitutional:       General: She is awake.   HENT:      Head: Normocephalic and atraumatic.      Right Ear: Ear canal and external ear normal. No tenderness. No middle ear effusion. There is no impacted cerumen.      Left Ear: Tympanic membrane, ear canal and external ear normal. No tenderness.  No middle ear effusion. There is no impacted cerumen.      Nose: Nose normal.      Right Turbinates: Not enlarged, swollen or pale.      Left Turbinates: Not enlarged, swollen or pale.      Right Sinus: No maxillary sinus tenderness or frontal sinus tenderness.      Left Sinus: No maxillary sinus tenderness or frontal sinus tenderness.      Mouth/Throat:      Mouth: Mucous membranes are moist.      Dentition: Normal dentition.      Pharynx: Uvula midline. Posterior oropharyngeal erythema present. No oropharyngeal exudate or uvula swelling.      Tonsils: No tonsillar exudate. 2+ on the right. 2+ on the left.   Eyes:      General: Lids are normal. No scleral icterus.     Extraocular Movements: Extraocular movements intact.       Conjunctiva/sclera: Conjunctivae normal.      Right eye: Right conjunctiva is not injected.      Left eye: Left conjunctiva is not injected.   Neck:      Thyroid: No thyromegaly or thyroid tenderness.   Cardiovascular:      Rate and Rhythm: Normal rate and regular rhythm.      Heart sounds: Normal heart sounds. No murmur heard.    No friction rub. No gallop.   Pulmonary:      Effort: Pulmonary effort is normal.      Breath sounds: Normal breath sounds. No wheezing, rhonchi or rales.   Abdominal:      General: Abdomen is flat. Bowel sounds are decreased. There is no distension or abdominal bruit.      Palpations: Abdomen is soft. There is no hepatomegaly, splenomegaly or mass.      Tenderness: There is no abdominal tenderness. Negative signs include McBurney's sign.      Comments: Tympany to percussion throughout, no hepatosplenomegaly noted, no tenderness to light or deep palpation in all four quadrants.    Musculoskeletal:      Cervical back: Normal range of motion and neck supple.   Lymphadenopathy:      Cervical: No cervical adenopathy.   Skin:     General: Skin is warm and dry.      Coloration: Skin is not jaundiced.      Findings: No bruising.   Neurological:      Mental Status: She is alert.   Psychiatric:         Mood and Affect: Mood normal.         Behavior: Behavior normal.         Thought Content: Thought content normal.         Judgment: Judgment normal.         Assessment:       Problem List Items Addressed This Visit    None     Visit Diagnoses     Sore throat    -  Primary    Relevant Orders    POCT rapid strep A (Completed)    Diarrhea, unspecified type        Abdominal pain, unspecified abdominal location              Plan:       Covid, flu and rapid strep test today negative  CBC, CMP, amylase and lipase ordered, will call patient with results  Patient instructed to increase fluid intake with water and Pedialyte and to eat a bland diet  Patient given a red cup and instructed to bring back a stool  sample for testing to rule out bacterial infection  Zofran prn nausea

## 2022-06-24 NOTE — PROGRESS NOTES
Kat Nam is a 61 y.o. female seen today for nurse visit only      Past Medical History:   Diagnosis Date    Asthma     Bilateral leg pain     Chronic low back pain     Compression of vein     Iliac vein compression syndrome    Digestive disorder     Edema of lower extremity     Gastroesophageal reflux disease     History of basal cell carcinoma (BCC) of skin 02/28/2022    Hx of phlebitis     Hx of thrombophlebitis     Hyperlipidemia     Hypertension     Lymphedema, not elsewhere classified     Morbid obesity     Other skin changes     Peripheral edema     Peripheral vascular disease, unspecified     Postartificial menopausal syndrome     Premature atrial contraction     Sleep apnea      Family History   Problem Relation Age of Onset    Diabetes Mellitus Mother     Heart disease Father     Hypertension Father     Diabetes Mellitus Sister     Diabetes Mellitus Brother     Heart disease Brother      Current Outpatient Medications on File Prior to Visit   Medication Sig Dispense Refill    albuterol (PROVENTIL/VENTOLIN HFA) 90 mcg/actuation inhaler Inhale 2 puffs into the lungs every 4 (four) hours as needed for Wheezing. Rescue 18 g 5    carvediloL (COREG) 12.5 MG tablet TAKE ONE (1) TABLET BY MOUTH TWO (2) (TWO) TIMES DAILY WITH MEALS. 180 tablet 1    furosemide (LASIX) 20 MG tablet Take 1 tablet (20 mg total) by mouth once daily. 90 tablet 1    olmesartan (BENICAR) 40 MG tablet Take 1 tablet (40 mg total) by mouth once daily. 90 tablet 1    pantoprazole (PROTONIX) 40 MG tablet Take 1 tablet (40 mg total) by mouth once daily. 90 tablet 1    SYMBICORT 160-4.5 mcg/actuation HFAA Inhale 4 puffs into the lungs every 12 (twelve) hours. 10.2 g 5    cloNIDine (CATAPRES) 0.1 MG tablet Take 1 tablet (0.1 mg total) by mouth once. for 1 dose 90 tablet 1     No current facility-administered medications on file prior to visit.       There is no immunization history on file for this  patient.    ROS     Vitals:    06/22/22 0946   Pulse: 83   Temp: 98.3 °F (36.8 °C)       Physical Exam     Assessment and Plan  Cough  -     POCT SARS-COV2 (COVID) with Flu Antigen    Diarrhea, unspecified type  -     POCT SARS-COV2 (COVID) with Flu Antigen    Body aches  -     POCT SARS-COV2 (COVID) with Flu Antigen    Nausea  -     ondansetron (ZOFRAN) 4 MG tablet; Take 2 tablets (8 mg total) by mouth 2 (two) times daily.  Dispense: 30 tablet; Refill: 0      Flu and Covid were negative      Return to clinic in 3 days for retesting.    Health Maintenance Topics with due status: Not Due       Topic Last Completion Date    Lipid Panel 04/08/2022    Influenza Vaccine Not Due     Patient seen as a nurse visit only

## 2022-06-27 ENCOUNTER — TELEPHONE (OUTPATIENT)
Dept: FAMILY MEDICINE | Facility: CLINIC | Age: 62
End: 2022-06-27
Payer: COMMERCIAL

## 2022-06-27 NOTE — TELEPHONE ENCOUNTER
----- Message from Bony Mary MD sent at 6/27/2022  7:54 AM CDT -----  Office visit renal function.

## 2022-06-30 ENCOUNTER — OFFICE VISIT (OUTPATIENT)
Dept: FAMILY MEDICINE | Facility: CLINIC | Age: 62
End: 2022-06-30
Payer: COMMERCIAL

## 2022-06-30 VITALS
OXYGEN SATURATION: 98 % | DIASTOLIC BLOOD PRESSURE: 67 MMHG | WEIGHT: 293 LBS | RESPIRATION RATE: 18 BRPM | HEIGHT: 66 IN | SYSTOLIC BLOOD PRESSURE: 133 MMHG | HEART RATE: 71 BPM | BODY MASS INDEX: 47.09 KG/M2 | TEMPERATURE: 98 F

## 2022-06-30 DIAGNOSIS — R11.0 NAUSEA: ICD-10-CM

## 2022-06-30 DIAGNOSIS — Z12.11 SCREENING FOR COLON CANCER: Primary | ICD-10-CM

## 2022-06-30 DIAGNOSIS — R19.7 DIARRHEA, UNSPECIFIED TYPE: ICD-10-CM

## 2022-06-30 PROCEDURE — 3075F PR MOST RECENT SYSTOLIC BLOOD PRESS GE 130-139MM HG: ICD-10-PCS | Mod: CPTII,,, | Performed by: FAMILY MEDICINE

## 2022-06-30 PROCEDURE — 99212 PR OFFICE/OUTPT VISIT, EST, LEVL II, 10-19 MIN: ICD-10-PCS | Mod: ,,, | Performed by: FAMILY MEDICINE

## 2022-06-30 PROCEDURE — 1160F PR REVIEW ALL MEDS BY PRESCRIBER/CLIN PHARMACIST DOCUMENTED: ICD-10-PCS | Mod: CPTII,,, | Performed by: FAMILY MEDICINE

## 2022-06-30 PROCEDURE — 3078F DIAST BP <80 MM HG: CPT | Mod: CPTII,,, | Performed by: FAMILY MEDICINE

## 2022-06-30 PROCEDURE — 3008F PR BODY MASS INDEX (BMI) DOCUMENTED: ICD-10-PCS | Mod: CPTII,,, | Performed by: FAMILY MEDICINE

## 2022-06-30 PROCEDURE — 1160F RVW MEDS BY RX/DR IN RCRD: CPT | Mod: CPTII,,, | Performed by: FAMILY MEDICINE

## 2022-06-30 PROCEDURE — 3078F PR MOST RECENT DIASTOLIC BLOOD PRESSURE < 80 MM HG: ICD-10-PCS | Mod: CPTII,,, | Performed by: FAMILY MEDICINE

## 2022-06-30 PROCEDURE — 3008F BODY MASS INDEX DOCD: CPT | Mod: CPTII,,, | Performed by: FAMILY MEDICINE

## 2022-06-30 PROCEDURE — 1159F MED LIST DOCD IN RCRD: CPT | Mod: CPTII,,, | Performed by: FAMILY MEDICINE

## 2022-06-30 PROCEDURE — 3075F SYST BP GE 130 - 139MM HG: CPT | Mod: CPTII,,, | Performed by: FAMILY MEDICINE

## 2022-06-30 PROCEDURE — 4010F ACE/ARB THERAPY RXD/TAKEN: CPT | Mod: CPTII,,, | Performed by: FAMILY MEDICINE

## 2022-06-30 PROCEDURE — 1159F PR MEDICATION LIST DOCUMENTED IN MEDICAL RECORD: ICD-10-PCS | Mod: CPTII,,, | Performed by: FAMILY MEDICINE

## 2022-06-30 PROCEDURE — 4010F PR ACE/ARB THEARPY RXD/TAKEN: ICD-10-PCS | Mod: CPTII,,, | Performed by: FAMILY MEDICINE

## 2022-06-30 PROCEDURE — 99212 OFFICE O/P EST SF 10 MIN: CPT | Mod: ,,, | Performed by: FAMILY MEDICINE

## 2022-06-30 NOTE — PROGRESS NOTES
Kat Nam is a 61 y.o. female seen today for follow-up for diarrhea.  She reports this symptom has resolved but she is having persistent nausea.  She has had no recent GI evaluation.  And for now, does defer a colon cancer screening through colonoscopy.  She would prefer a Cologuard.      Past Medical History:   Diagnosis Date    Asthma     Bilateral leg pain     Chronic low back pain     Compression of vein     Iliac vein compression syndrome    Digestive disorder     Edema of lower extremity     Gastroesophageal reflux disease     History of basal cell carcinoma (BCC) of skin 02/28/2022    Hx of phlebitis     Hx of thrombophlebitis     Hyperlipidemia     Hypertension     Lymphedema, not elsewhere classified     Morbid obesity     Other skin changes     Peripheral edema     Peripheral vascular disease, unspecified     Postartificial menopausal syndrome     Premature atrial contraction     Sleep apnea      Family History   Problem Relation Age of Onset    Diabetes Mellitus Mother     Heart disease Father     Hypertension Father     Diabetes Mellitus Sister     Diabetes Mellitus Brother     Heart disease Brother      Current Outpatient Medications on File Prior to Visit   Medication Sig Dispense Refill    albuterol (PROVENTIL/VENTOLIN HFA) 90 mcg/actuation inhaler Inhale 2 puffs into the lungs every 4 (four) hours as needed for Wheezing. Rescue 18 g 5    carvediloL (COREG) 12.5 MG tablet TAKE ONE (1) TABLET BY MOUTH TWO (2) (TWO) TIMES DAILY WITH MEALS. 180 tablet 1    furosemide (LASIX) 20 MG tablet Take 1 tablet (20 mg total) by mouth once daily. 90 tablet 1    olmesartan (BENICAR) 40 MG tablet Take 1 tablet (40 mg total) by mouth once daily. 90 tablet 1    ondansetron (ZOFRAN) 4 MG tablet Take 2 tablets (8 mg total) by mouth 2 (two) times daily. 30 tablet 0    pantoprazole (PROTONIX) 40 MG tablet Take 1 tablet (40 mg total) by mouth once daily. 90 tablet 1    SYMBICORT  160-4.5 mcg/actuation HFAA Inhale 4 puffs into the lungs every 12 (twelve) hours. 10.2 g 5    cloNIDine (CATAPRES) 0.1 MG tablet Take 1 tablet (0.1 mg total) by mouth once. for 1 dose 90 tablet 1     No current facility-administered medications on file prior to visit.       There is no immunization history on file for this patient.    Review of Systems   Constitutional: Negative for fever, malaise/fatigue and weight loss.   Respiratory: Negative for shortness of breath.    Cardiovascular: Negative for chest pain and palpitations.   Gastrointestinal: Positive for constipation and nausea. Negative for blood in stool, diarrhea, heartburn and vomiting.   Psychiatric/Behavioral: Negative for depression.        Vitals:    06/30/22 0859   BP: 133/67   Pulse: 71   Resp: 18   Temp: 98.1 °F (36.7 °C)       Physical Exam  Eyes:      Conjunctiva/sclera: Conjunctivae normal.   Pulmonary:      Effort: Pulmonary effort is normal.   Neurological:      Mental Status: She is alert.   Psychiatric:         Mood and Affect: Mood normal.         Behavior: Behavior normal.         Thought Content: Thought content normal.         Judgment: Judgment normal.          Assessment and Plan  Screening for colon cancer  -     Cologuard Screening (Multitarget Stool DNA); Future; Expected date: 07/07/2022    Diarrhea, unspecified type  -     Ambulatory referral/consult to Gastroenterology; Future; Expected date: 07/07/2022    Nausea  -     Ambulatory referral/consult to Gastroenterology; Future; Expected date: 07/07/2022            Return to clinic in 3 months or as needed if symptoms worsen or persist.    Health Maintenance Topics with due status: Not Due       Topic Last Completion Date    Lipid Panel 04/08/2022    Influenza Vaccine Not Due

## 2022-07-26 ENCOUNTER — TELEPHONE (OUTPATIENT)
Dept: FAMILY MEDICINE | Facility: CLINIC | Age: 62
End: 2022-07-26
Payer: COMMERCIAL

## 2022-07-26 LAB — NONINV COLON CA DNA+OCC BLD SCRN STL QL: NEGATIVE

## 2022-09-28 ENCOUNTER — OFFICE VISIT (OUTPATIENT)
Dept: FAMILY MEDICINE | Facility: CLINIC | Age: 62
End: 2022-09-28
Payer: COMMERCIAL

## 2022-09-28 VITALS
RESPIRATION RATE: 18 BRPM | SYSTOLIC BLOOD PRESSURE: 136 MMHG | HEART RATE: 78 BPM | BODY MASS INDEX: 47.09 KG/M2 | WEIGHT: 293 LBS | HEIGHT: 66 IN | OXYGEN SATURATION: 98 % | DIASTOLIC BLOOD PRESSURE: 82 MMHG

## 2022-09-28 DIAGNOSIS — M77.31 HEEL SPUR, RIGHT: ICD-10-CM

## 2022-09-28 DIAGNOSIS — I49.1 PAC (PREMATURE ATRIAL CONTRACTION): ICD-10-CM

## 2022-09-28 DIAGNOSIS — K21.9 GASTROESOPHAGEAL REFLUX DISEASE, UNSPECIFIED WHETHER ESOPHAGITIS PRESENT: ICD-10-CM

## 2022-09-28 DIAGNOSIS — J45.909 ASTHMA, UNSPECIFIED ASTHMA SEVERITY, UNSPECIFIED WHETHER COMPLICATED, UNSPECIFIED WHETHER PERSISTENT: ICD-10-CM

## 2022-09-28 DIAGNOSIS — I10 HYPERTENSION, UNSPECIFIED TYPE: Primary | ICD-10-CM

## 2022-09-28 LAB
ALBUMIN SERPL BCP-MCNC: 3.9 G/DL (ref 3.5–5)
ALBUMIN/GLOB SERPL: 1.3 {RATIO}
ALP SERPL-CCNC: 108 U/L (ref 50–130)
ALT SERPL W P-5'-P-CCNC: 15 U/L (ref 13–56)
ANION GAP SERPL CALCULATED.3IONS-SCNC: 11 MMOL/L (ref 7–16)
AST SERPL W P-5'-P-CCNC: 10 U/L (ref 15–37)
BASOPHILS # BLD AUTO: 0.07 K/UL (ref 0–0.2)
BASOPHILS NFR BLD AUTO: 1 % (ref 0–1)
BILIRUB SERPL-MCNC: 0.3 MG/DL (ref ?–1.2)
BUN SERPL-MCNC: 23 MG/DL (ref 7–18)
BUN/CREAT SERPL: 21 (ref 6–20)
CALCIUM SERPL-MCNC: 9.3 MG/DL (ref 8.5–10.1)
CHLORIDE SERPL-SCNC: 104 MMOL/L (ref 98–107)
CHOLEST SERPL-MCNC: 204 MG/DL (ref 0–200)
CHOLEST/HDLC SERPL: 3.5 {RATIO}
CO2 SERPL-SCNC: 28 MMOL/L (ref 21–32)
CREAT SERPL-MCNC: 1.11 MG/DL (ref 0.55–1.02)
DIFFERENTIAL METHOD BLD: ABNORMAL
EGFR (NO RACE VARIABLE) (RUSH/TITUS): 56 ML/MIN/1.73M²
EOSINOPHIL # BLD AUTO: 0.38 K/UL (ref 0–0.5)
EOSINOPHIL NFR BLD AUTO: 5.5 % (ref 1–4)
ERYTHROCYTE [DISTWIDTH] IN BLOOD BY AUTOMATED COUNT: 13.9 % (ref 11.5–14.5)
GLOBULIN SER-MCNC: 3.1 G/DL (ref 2–4)
GLUCOSE SERPL-MCNC: 86 MG/DL (ref 74–106)
HCT VFR BLD AUTO: 41.4 % (ref 38–47)
HDLC SERPL-MCNC: 58 MG/DL (ref 40–60)
HGB BLD-MCNC: 12.7 G/DL (ref 12–16)
IMM GRANULOCYTES # BLD AUTO: 0.03 K/UL (ref 0–0.04)
IMM GRANULOCYTES NFR BLD: 0.4 % (ref 0–0.4)
LDLC SERPL CALC-MCNC: 124 MG/DL
LDLC/HDLC SERPL: 2.1 {RATIO}
LYMPHOCYTES # BLD AUTO: 1.56 K/UL (ref 1–4.8)
LYMPHOCYTES NFR BLD AUTO: 22.4 % (ref 27–41)
MCH RBC QN AUTO: 28.2 PG (ref 27–31)
MCHC RBC AUTO-ENTMCNC: 30.7 G/DL (ref 32–36)
MCV RBC AUTO: 91.8 FL (ref 80–96)
MONOCYTES # BLD AUTO: 0.42 K/UL (ref 0–0.8)
MONOCYTES NFR BLD AUTO: 6 % (ref 2–6)
MPC BLD CALC-MCNC: 12.6 FL (ref 9.4–12.4)
NEUTROPHILS # BLD AUTO: 4.49 K/UL (ref 1.8–7.7)
NEUTROPHILS NFR BLD AUTO: 64.7 % (ref 53–65)
NONHDLC SERPL-MCNC: 146 MG/DL
NRBC # BLD AUTO: 0 X10E3/UL
NRBC, AUTO (.00): 0 %
PLATELET # BLD AUTO: 277 K/UL (ref 150–400)
POTASSIUM SERPL-SCNC: 5.4 MMOL/L (ref 3.5–5.1)
PROT SERPL-MCNC: 7 G/DL (ref 6.4–8.2)
RBC # BLD AUTO: 4.51 M/UL (ref 4.2–5.4)
SODIUM SERPL-SCNC: 138 MMOL/L (ref 136–145)
TRIGL SERPL-MCNC: 109 MG/DL (ref 35–150)
VLDLC SERPL-MCNC: 22 MG/DL
WBC # BLD AUTO: 6.95 K/UL (ref 4.5–11)

## 2022-09-28 PROCEDURE — 3079F DIAST BP 80-89 MM HG: CPT | Mod: ,,, | Performed by: FAMILY MEDICINE

## 2022-09-28 PROCEDURE — 80061 LIPID PANEL: ICD-10-PCS | Mod: ,,, | Performed by: CLINICAL MEDICAL LABORATORY

## 2022-09-28 PROCEDURE — 80061 LIPID PANEL: CPT | Mod: ,,, | Performed by: CLINICAL MEDICAL LABORATORY

## 2022-09-28 PROCEDURE — 85025 COMPLETE CBC W/AUTO DIFF WBC: CPT | Mod: ,,, | Performed by: CLINICAL MEDICAL LABORATORY

## 2022-09-28 PROCEDURE — 3008F BODY MASS INDEX DOCD: CPT | Mod: ,,, | Performed by: FAMILY MEDICINE

## 2022-09-28 PROCEDURE — 3075F PR MOST RECENT SYSTOLIC BLOOD PRESS GE 130-139MM HG: ICD-10-PCS | Mod: ,,, | Performed by: FAMILY MEDICINE

## 2022-09-28 PROCEDURE — 1159F PR MEDICATION LIST DOCUMENTED IN MEDICAL RECORD: ICD-10-PCS | Mod: ,,, | Performed by: FAMILY MEDICINE

## 2022-09-28 PROCEDURE — 80053 COMPREHENSIVE METABOLIC PANEL: ICD-10-PCS | Mod: ,,, | Performed by: CLINICAL MEDICAL LABORATORY

## 2022-09-28 PROCEDURE — 3008F PR BODY MASS INDEX (BMI) DOCUMENTED: ICD-10-PCS | Mod: ,,, | Performed by: FAMILY MEDICINE

## 2022-09-28 PROCEDURE — 4010F PR ACE/ARB THEARPY RXD/TAKEN: ICD-10-PCS | Mod: ,,, | Performed by: FAMILY MEDICINE

## 2022-09-28 PROCEDURE — 99214 PR OFFICE/OUTPT VISIT, EST, LEVL IV, 30-39 MIN: ICD-10-PCS | Mod: ,,, | Performed by: FAMILY MEDICINE

## 2022-09-28 PROCEDURE — 85025 CBC WITH DIFFERENTIAL: ICD-10-PCS | Mod: ,,, | Performed by: CLINICAL MEDICAL LABORATORY

## 2022-09-28 PROCEDURE — 80053 COMPREHEN METABOLIC PANEL: CPT | Mod: ,,, | Performed by: CLINICAL MEDICAL LABORATORY

## 2022-09-28 PROCEDURE — 1159F MED LIST DOCD IN RCRD: CPT | Mod: ,,, | Performed by: FAMILY MEDICINE

## 2022-09-28 PROCEDURE — 3079F PR MOST RECENT DIASTOLIC BLOOD PRESSURE 80-89 MM HG: ICD-10-PCS | Mod: ,,, | Performed by: FAMILY MEDICINE

## 2022-09-28 PROCEDURE — 1160F PR REVIEW ALL MEDS BY PRESCRIBER/CLIN PHARMACIST DOCUMENTED: ICD-10-PCS | Mod: ,,, | Performed by: FAMILY MEDICINE

## 2022-09-28 PROCEDURE — 99214 OFFICE O/P EST MOD 30 MIN: CPT | Mod: ,,, | Performed by: FAMILY MEDICINE

## 2022-09-28 PROCEDURE — 1160F RVW MEDS BY RX/DR IN RCRD: CPT | Mod: ,,, | Performed by: FAMILY MEDICINE

## 2022-09-28 PROCEDURE — 4010F ACE/ARB THERAPY RXD/TAKEN: CPT | Mod: ,,, | Performed by: FAMILY MEDICINE

## 2022-09-28 PROCEDURE — 3075F SYST BP GE 130 - 139MM HG: CPT | Mod: ,,, | Performed by: FAMILY MEDICINE

## 2022-09-28 RX ORDER — BUDESONIDE AND FORMOTEROL FUMARATE DIHYDRATE 160; 4.5 UG/1; UG/1
4 AEROSOL RESPIRATORY (INHALATION) EVERY 12 HOURS
Qty: 10.2 G | Refills: 5 | Status: ON HOLD | OUTPATIENT
Start: 2022-09-28 | End: 2023-02-11 | Stop reason: HOSPADM

## 2022-09-28 RX ORDER — OLMESARTAN MEDOXOMIL 40 MG/1
40 TABLET ORAL DAILY
Qty: 90 TABLET | Refills: 1 | Status: ON HOLD | OUTPATIENT
Start: 2022-09-28 | End: 2023-02-11 | Stop reason: HOSPADM

## 2022-09-28 RX ORDER — CARVEDILOL 12.5 MG/1
TABLET ORAL
Qty: 180 TABLET | Refills: 1 | Status: ON HOLD | OUTPATIENT
Start: 2022-09-28 | End: 2023-02-11 | Stop reason: HOSPADM

## 2022-09-28 RX ORDER — PANTOPRAZOLE SODIUM 40 MG/1
40 TABLET, DELAYED RELEASE ORAL DAILY
Qty: 90 TABLET | Refills: 1 | Status: ON HOLD | OUTPATIENT
Start: 2022-09-28 | End: 2023-05-21 | Stop reason: SDUPTHER

## 2022-09-28 RX ORDER — CLONIDINE HYDROCHLORIDE 0.1 MG/1
0.1 TABLET ORAL ONCE
Qty: 90 TABLET | Refills: 1 | Status: ON HOLD | OUTPATIENT
Start: 2022-09-28 | End: 2023-02-11 | Stop reason: HOSPADM

## 2022-09-28 RX ORDER — ALBUTEROL SULFATE 90 UG/1
2 AEROSOL, METERED RESPIRATORY (INHALATION) EVERY 4 HOURS PRN
Qty: 18 G | Refills: 5 | Status: ON HOLD | OUTPATIENT
Start: 2022-09-28 | End: 2023-05-21 | Stop reason: SDUPTHER

## 2022-09-28 NOTE — PROGRESS NOTES
Kat Nam is a 62 y.o. female seen today for follow-up on her hyperlipidemia and hypertension.  Patient also has asthma with no recent asthma exacerbations.  He is fasting today for follow-up lab work.  Her last lipid panel was not to goal with an .  Patient does defer statin use for now patient is working on her diet.  Patient also has a past medical history of bilateral heel spurs and is having a flare involving her right foot. She has had physical therapy in the past which worked well.  She also has a past medical history of premature atrial contractions and needs follow-up with Cardiology.    Past Medical History:   Diagnosis Date    Asthma     Bilateral leg pain     Chronic low back pain     Compression of vein     Iliac vein compression syndrome    Digestive disorder     Edema of lower extremity     Gastroesophageal reflux disease     History of basal cell carcinoma (BCC) of skin 02/28/2022    Hx of phlebitis     Hx of thrombophlebitis     Hyperlipidemia     Hypertension     Lymphedema, not elsewhere classified     Morbid obesity     Other skin changes     Peripheral edema     Peripheral vascular disease, unspecified     Postartificial menopausal syndrome     Premature atrial contraction     Sleep apnea      Family History   Problem Relation Age of Onset    Diabetes Mellitus Mother     Heart disease Father     Hypertension Father     Diabetes Mellitus Sister     Diabetes Mellitus Brother     Heart disease Brother      Current Outpatient Medications on File Prior to Visit   Medication Sig Dispense Refill    furosemide (LASIX) 20 MG tablet Take 1 tablet (20 mg total) by mouth once daily. 90 tablet 1    ondansetron (ZOFRAN) 4 MG tablet Take 2 tablets (8 mg total) by mouth 2 (two) times daily. 30 tablet 0    [DISCONTINUED] albuterol (PROVENTIL/VENTOLIN HFA) 90 mcg/actuation inhaler Inhale 2 puffs into the lungs every 4 (four) hours as needed for Wheezing. Rescue 18 g 5    [DISCONTINUED] carvediloL  (COREG) 12.5 MG tablet TAKE ONE (1) TABLET BY MOUTH TWO (2) (TWO) TIMES DAILY WITH MEALS. 180 tablet 1    [DISCONTINUED] olmesartan (BENICAR) 40 MG tablet Take 1 tablet (40 mg total) by mouth once daily. 90 tablet 1    [DISCONTINUED] pantoprazole (PROTONIX) 40 MG tablet Take 1 tablet (40 mg total) by mouth once daily. 90 tablet 1    [DISCONTINUED] SYMBICORT 160-4.5 mcg/actuation HFAA Inhale 4 puffs into the lungs every 12 (twelve) hours. 10.2 g 5    [DISCONTINUED] cloNIDine (CATAPRES) 0.1 MG tablet Take 1 tablet (0.1 mg total) by mouth once. for 1 dose 90 tablet 1     No current facility-administered medications on file prior to visit.       There is no immunization history on file for this patient.    Review of Systems   Constitutional:  Negative for fever, malaise/fatigue and weight loss.   Respiratory:  Negative for shortness of breath.    Cardiovascular:  Negative for chest pain and palpitations.   Gastrointestinal:  Negative for nausea and vomiting.   Musculoskeletal:  Negative for joint pain.   Psychiatric/Behavioral:  Negative for depression.       Vitals:    09/28/22 0902   BP: 136/82   Pulse: 78   Resp: 18       Physical Exam  Vitals reviewed.   Constitutional:       Appearance: Normal appearance.   HENT:      Head: Normocephalic.   Eyes:      Extraocular Movements: Extraocular movements intact.      Conjunctiva/sclera: Conjunctivae normal.      Pupils: Pupils are equal, round, and reactive to light.   Neck:      Thyroid: No thyroid mass or thyromegaly.   Cardiovascular:      Rate and Rhythm: Normal rate. Rhythm irregular.      Heart sounds: Normal heart sounds. No murmur heard.    No gallop.   Pulmonary:      Effort: Pulmonary effort is normal. No respiratory distress.      Breath sounds: Normal breath sounds. No wheezing or rales.   Skin:     General: Skin is warm and dry.      Coloration: Skin is not jaundiced or pale.   Neurological:      Mental Status: She is alert.   Psychiatric:         Mood and  Affect: Mood normal.         Behavior: Behavior normal.         Thought Content: Thought content normal.         Judgment: Judgment normal.        Assessment and Plan  Hypertension, unspecified type  -     cloNIDine (CATAPRES) 0.1 MG tablet; Take 1 tablet (0.1 mg total) by mouth once. for 1 dose  Dispense: 90 tablet; Refill: 1  -     carvediloL (COREG) 12.5 MG tablet; TAKE ONE (1) TABLET BY MOUTH TWO (2) (TWO) TIMES DAILY WITH MEALS.  Dispense: 180 tablet; Refill: 1  -     olmesartan (BENICAR) 40 MG tablet; Take 1 tablet (40 mg total) by mouth once daily.  Dispense: 90 tablet; Refill: 1  -     CBC Auto Differential; Future; Expected date: 09/28/2022  -     Comprehensive Metabolic Panel; Future; Expected date: 09/28/2022  -     Lipid Panel; Future; Expected date: 09/28/2022    Gastroesophageal reflux disease, unspecified whether esophagitis present  -     pantoprazole (PROTONIX) 40 MG tablet; Take 1 tablet (40 mg total) by mouth once daily.  Dispense: 90 tablet; Refill: 1    Asthma, unspecified asthma severity, unspecified whether complicated, unspecified whether persistent  -     albuterol (PROVENTIL/VENTOLIN HFA) 90 mcg/actuation inhaler; Inhale 2 puffs into the lungs every 4 (four) hours as needed for Wheezing. Rescue  Dispense: 18 g; Refill: 5  -     SYMBICORT 160-4.5 mcg/actuation HFAA; Inhale 4 puffs into the lungs every 12 (twelve) hours.  Dispense: 10.2 g; Refill: 5    Heel spur, right  -     Ambulatory referral/consult to Physical/Occupational Therapy; Future; Expected date: 10/05/2022    PAC (premature atrial contraction)  -     Ambulatory referral/consult to Cardiology; Future; Expected date: 10/05/2022            Return to clinic in 6 months or as needed once her lab work is in.    Health Maintenance Topics with due status: Not Due       Topic Last Completion Date    Lipid Panel 04/08/2022    Colorectal Cancer Screening 07/18/2022

## 2022-09-29 ENCOUNTER — TELEPHONE (OUTPATIENT)
Dept: FAMILY MEDICINE | Facility: CLINIC | Age: 62
End: 2022-09-29
Payer: COMMERCIAL

## 2022-09-29 NOTE — TELEPHONE ENCOUNTER
Pt notified and verbalized understanding. Pt reports she would like to wait until her 3 month f\u to discuss labs.

## 2022-09-29 NOTE — TELEPHONE ENCOUNTER
----- Message from Bony Mary MD sent at 9/29/2022  7:58 AM CDT -----  Office visit for renal function and LDL.

## 2022-10-03 ENCOUNTER — HOSPITAL ENCOUNTER (OUTPATIENT)
Dept: RADIOLOGY | Facility: HOSPITAL | Age: 62
Discharge: HOME | End: 2022-10-03
Attending: ORTHOPAEDIC SURGERY
Payer: COMMERCIAL

## 2022-10-03 DIAGNOSIS — M79.671 RIGHT FOOT PAIN: ICD-10-CM

## 2022-10-03 PROBLEM — M76.60 ACHILLES TENDINITIS: Status: ACTIVE | Noted: 2022-10-03

## 2022-10-03 PROCEDURE — 73630 X-RAY EXAM OF FOOT: CPT | Mod: TC,RT

## 2022-10-12 ENCOUNTER — CLINICAL SUPPORT (OUTPATIENT)
Dept: REHABILITATION | Facility: HOSPITAL | Age: 62
End: 2022-10-12
Attending: FAMILY MEDICINE
Payer: COMMERCIAL

## 2022-10-12 DIAGNOSIS — M25.671 DECREASED RANGE OF MOTION OF RIGHT ANKLE: ICD-10-CM

## 2022-10-12 DIAGNOSIS — R29.898 WEAKNESS OF RIGHT LEG: ICD-10-CM

## 2022-10-12 DIAGNOSIS — M77.31 HEEL SPUR, RIGHT: ICD-10-CM

## 2022-10-12 DIAGNOSIS — M92.61 HAGLUND'S DEFORMITY OF RIGHT HEEL: Primary | ICD-10-CM

## 2022-10-12 DIAGNOSIS — M79.671 PAIN OF RIGHT HEEL: ICD-10-CM

## 2022-10-12 PROCEDURE — 97161 PT EVAL LOW COMPLEX 20 MIN: CPT

## 2022-10-12 PROCEDURE — 97110 THERAPEUTIC EXERCISES: CPT

## 2022-10-12 NOTE — PROGRESS NOTES
See Plan of Care     Sup Visit performed today with ADEN Bull and ADEN Mascorro.  All goals and treatment plan reviewed. Will work toward completion of all goals set.     First Treatment May Include :     Graston to heel/achilles     Bike/Cubii   SB  HAMSTRING STRETCH stretch     Ankle 4 way - Active Range of Motion / Resisted   Dorsiflexion   Plantarflexion   Inversion   Eversion   Circles     Hydraulic Ankle 4 way     Achilles Stretch on Stairs - 4 x 15 seconds  Heel Raises on Stairs - 34x 10     Prostretch in Standing     Single Leg Stance - Firm   Single Leg Stance - Foam

## 2022-10-12 NOTE — PLAN OF CARE
[facility]   Physical Therapy Initial Evaluation    Date: 10/12/2022   Name: Kat VALDES [facility] Number: [id number]    Therapy Diagnosis: Carlos's deformity with spurring  Physician: Bony Mary MD    Physician Orders: PT Eval and Treat   Medical Diagnosis from Referral: Carlos's deformity with spurring  Evaluation Date: 10/12/2022  Updated Plan of Care Due : 11/11/2022  Authorization Period Expiration: 9/28/2023  Plan of Care Expiration: 11/25/2022  Visit # / Visits authorized: 1/ 20    Time In: 11:00 am   Time Out: 11:55 am   Total Appointment Time (timed & untimed codes): 55 minutes    Precautions: Standard    Subjective   Date of onset: 10/3/2022  History of current condition - Kat reports: She has had problems with Achilles tendonitis over the past 10 years, but the Right foot started hurting more severely approx 4 weeks ago. MD performed X Ray and MRI. Achilles tendon appears to be intact. Multiple spurs noted along the Calcaneus. Patient was diagnoses with Carlos's deformity and was referred to Physical Therapy.    Patient saw Ortho MD on 10/3/2022. MD Note States in Part :   HISTORY:  62-year-old female has had a history of Achilles tendinitis in remote past he is now having pain over her right Achilles it hurts when she stands she has been through therapy she is not getting relief the symptoms are pain is over the Achilles she has large Carlos's deformity denies numbness or tingling        Medical History:   Past Medical History:   Diagnosis Date    Asthma     Bilateral leg pain     Chronic low back pain     Compression of vein     Iliac vein compression syndrome    Digestive disorder     Edema of lower extremity     Gastroesophageal reflux disease     History of basal cell carcinoma (BCC) of skin 02/28/2022    Hx of phlebitis     Hx of thrombophlebitis     Hyperlipidemia     Hypertension     Lymphedema, not elsewhere classified     Morbid obesity     Other skin changes      Peripheral edema     Peripheral vascular disease, unspecified     Postartificial menopausal syndrome     Premature atrial contraction     Sleep apnea        Surgical History:   Kat Nam  has a past surgical history that includes  section; Cholecystectomy; Spine surgery (); Hysterectomy; Arthroscopy of knee (Left, 2021); Excision of medial meniscus of knee (Left, 2021); Knee arthroscopy w/ meniscectomy (Left, 2021); and Excision basal cell carcinoma (2022).    Medications:   Kat has a current medication list which includes the following prescription(s): albuterol, carvedilol, clonidine, furosemide, olmesartan, ondansetron, pantoprazole, and symbicort.    Allergies:   Review of patient's allergies indicates:   Allergen Reactions    Poppy Swelling    Aldomet amanda hcl injection      Headache        Imaging,   X Ray Showed : AP lateral oblique right foot skeletally mature individual there is normal mineralization there is calcification at the insertion of the Achilles there is a Carlos's deformity no fractures noted no bony lesions impression Carlos's deformity with spurring at the insertion of the Achilles right foot no fractures noted     MRI ShowedFINDINGS:  Joint alignment is normal.  Small ankle joint effusion is seen.  The effusion extends around the flexor hallucis longus tendon.  There is also increased fluid in the peroneal tendon sheath.  These tendons appear within normal limits.     No abnormal osseous marrow signal is present.     There is thickening of the Achilles tendon near the insertion with intermediate signal.  Small amount of fluid present in the retrocalcaneal bursa.  There is enlargement of the posterosuperior calcaneus.     Remaining muscles, tendons and ligaments are intact without signal abnormality.     There is edema in the lateral ankle.  No other abnormal fluid collections or other signal abnormality is seen in the soft tissues. No other  "abnormalities are demonstrated.     Impression:     Carlos's deformity / disease as described above.  Small ankle joint effusion extending along the flexor hallucis longus tendon.  There is also fluid in the peroneal tendon sheath with loculated appearance could indicate stenosing tenosynovitis.  Lateral ankle edema.    Prior Therapy: None Recently   Social History:  lives with their spouse  Occupation: [employer] here at [employer]  Prior Level of Function: Walks apprx 3-5 miles per day at work  Current Level of Function: Pain with walking     Pain:  Current 6/10, worst 10/10, best 2/10   Location: right ankle/foot  Description: Aching, Tight, and Sharp; Leg feels tired and weak on occasion   Aggravating Factors: Weight Bearing activities   Easing Factors: pain medication, ice, hot bath, and rest    Patients goals: "I have to walk a lot for my job and I need to walk without hurting."    Objective         ANKLE OBSERVATION  Pronation: Bilateral Pronation (Pes Planus) upon standing with Left Worse than Right; She Supinates the Right foot during ambulation due to pain.     Girth malleoli: 29 cm left, 29.5 cm right           Left Lower Extremity  ROM/Strength Right lower Extremity                      AROM PROM STRENGTH  AROM PROM STRENGTH      KNEE     Flexion  (140)                        Extension (0)                         15  5/5 ANKLE  Dorsiflexion     (20)       10 12 3/5   40                  Plantarflexion  (50) 30 35    25                  Inversion         (35)     25 30    25                  Eversion          (25) 16                                                  Ambulation:  Patient has decreased step/stride length and antalgic gait on the Right. She supinates the Right foot with ambulation in order to offload the calcaneus due to pain with weight bearing.           Limitation/Restriction for FOTO Intake Foot Survey    Therapist reviewed FOTO scores for Kat VALDES Cyril on 10/12/2022.   FOTO documents " entered into [employer] - see Media section.    Limitation Score: 56%         TREATMENT   Treatment Time In: 11:40 am   Treatment Time Out: 11:50 am   Total Treatment time (time-based codes) separate from Evaluation: 10 minutes      Kat received the treatments listed below:  THERAPEUTIC EXERCISES to develop ROM and flexibility for 10 minutes including :  Initiated the Home Exercise Program today       Home Exercises and Patient Education Provided    Education provided:   - Discussed the findings from the Evaluation, Reviewed the Plan of Care, and Instructed patient on their Home Exercise Program      Written Home Exercises Provided: yes.  Exercises were reviewed and Kat was able to demonstrate them prior to the end of the session.  Kat demonstrated good  understanding of the education provided.     See EMR under Patient Instructions for exercises provided 10/12/2022.    Assessment   Kat is a 62 y.o. female referred to outpatient Physical Therapy with a medical diagnosis of Carlos's deformity with spurring. Kat reports: She has had problems with Achilles tendonitis over the past 10 years, but the Right foot started hurting more severely approx 4 weeks ago. MD performed X Ray and MRI. Achilles tendon appears to be intact. Multiple spurs noted along the Calcaneus. Patient was diagnoses with Carlos's deformity and was referred to Physical Therapy. Patient presents with decreased Range of Motion and Strength in the Right Ankle. She has pain along the calcaneus and X Rays show multiple spurs. Patient has decreased step/stride length and antalgic gait on the Right. She supinates the Right foot with ambulation in order to offload the calcaneus due to pain with weight bearing. Patient will require Physical Therapy Intervention to address all deficits and work toward completion of all goals set. Therapist will refer patient back to MD upon completion of Therapy for further assessment if pain and dysfunction persist.        Patient prognosis is Good.   Patientt will benefit from skilled outpatient Physical Therapy to address the deficits stated above and in the chart below, provide patient /family education, and to maximize patientt's level of independence.     Plan of care discussed with patient: Yes  Patient's spiritual, cultural and educational needs considered and patient is agreeable to the plan of care and goals as stated below:     Anticipated Barriers for therapy: Multiple Spurs      Goals:  Short Term Goals: 3 weeks   1. Independent with Home Exercise Program   2. Increase Right Ankle Active Range of Motion to 15 Degrees for Dorsiflexion and 45 Degrees for Plantarflexion  3. Increase Right Ankle Active Range of Motion to 30 Degrees for Inversion and 20 Degrees for Eversion  4. Increase Right Ankle Strength to grossly 4/5  5. Patient will ambulate 500 feet with stance approx equal from Right to Left with complaints of pain Less than or equal to 4/10.    Long Term Goals: 6 weeks   1. Patient will increase Right Ankle Strength to grossly 4+/5  2. Patient will increase Right Ankle Active Range of Motion to Within Normal Limits all directions   3. Patient will ambulate 1000+ feet with no complaints of pain or weakness in Right Ankle       Plan   Plan of care Certification: 10/12/2022 to 11/25/2022.    Outpatient Physical Therapy 2 times weekly for 6 weeks to include the following interventions: Electrical Stimulation to decrease pain and inflammation as needed, Gait Training, Iontophoresis, Manual Therapy, Moist Heat/ Ice, Neuromuscular Re-ed, Patient Education, Therapeutic Activities, Therapeutic Exercise, and Ultrasound.     TRAMAINE OLIVARES, PT, DPT      I CERTIFY THE NEED FOR THESE SERVICES FURNISHED UNDER THIS PLAN OF TREATMENT AND WHILE UNDER MY CARE.    Physician's comments:      Physician's Signature: ___________________________________________________

## 2022-10-19 ENCOUNTER — CLINICAL SUPPORT (OUTPATIENT)
Dept: REHABILITATION | Facility: HOSPITAL | Age: 62
End: 2022-10-19
Payer: COMMERCIAL

## 2022-10-19 DIAGNOSIS — M79.671 PAIN OF RIGHT HEEL: ICD-10-CM

## 2022-10-19 DIAGNOSIS — M92.61 HAGLUND'S DEFORMITY OF RIGHT HEEL: Primary | ICD-10-CM

## 2022-10-19 DIAGNOSIS — M25.671 DECREASED RANGE OF MOTION OF RIGHT ANKLE: ICD-10-CM

## 2022-10-19 DIAGNOSIS — R29.898 WEAKNESS OF RIGHT LEG: ICD-10-CM

## 2022-10-19 PROCEDURE — 97110 THERAPEUTIC EXERCISES: CPT | Mod: CQ

## 2022-10-19 PROCEDURE — 97140 MANUAL THERAPY 1/> REGIONS: CPT | Mod: CQ

## 2022-10-19 NOTE — PROGRESS NOTES
[facility]   Physical Therapy Treatment Note     Name: Kat Nam  Clinic Number: [id number]    Therapy Diagnosis:   Encounter Diagnoses   Name Primary?    Carlos's deformity of right heel Yes    Decreased range of motion of right ankle     Weakness of right leg     Pain of right heel      Physician: Bony Mary MD    Visit Date: 10/19/2022    Therapy Diagnosis: Carlos's deformity with spurring  Physician: Bony Mary MD     Physician Orders: PT Eval and Treat   Medical Diagnosis from Referral: Carlos's deformity with spurring  Evaluation Date: 10/12/2022  Updated Plan of Care Due : 11/11/2022  Authorization Period Expiration: 9/28/2023  Plan of Care Expiration: 11/25/2022    Visit # / Visits authorized: 2/ 20  PTA Visit #: 1/5     Time In: 7:46 am  Time Out: 8:25 am  Total Billable Time: 39 minutes    SUBJECTIVE     Pt reports: soreness due to working 12 hrs last night.  She was compliant with home exercise program.  Response to previous treatment: First since eval  Functional change: None    Pain: 5/10  Location: right ankles and feet      Prior Level of Function: Walks apprx 3-5 miles per day at work  Current Level of Function: Pain with walking     OBJECTIVE     Objective Measures updated at progress report unless specified.     Treatment     Kat received the treatments listed below:      therapeutic exercises to develop strength, endurance, and ROM for 25 minutes including:  Bike/Cubii 5 minutes  SB Gastroc - 4x20 seconds  SB Soleus - 4x20 seconds  HAMSTRING STRETCH stretch - 4x20 seconds     Ankle 4 way - Active Range of Motion / Resisted   Dorsiflexion   Plantarflexion   Inversion   Eversion   Circles      Hydraulic Ankle 4 way      Achilles Stretch on Stairs - 4 x 15 seconds  Heel Raises on Stairs - 3 x 10      Prostretch in Standing      Single Leg Stance - Firm - 3x30 seconds  Single Leg Stance - Foam     manual therapy techniques: Joint  mobilizations and Myofacial release were applied to the: foot/ankle for 13 minutes, including:  Graston, See Assessment    neuromuscular re-education activities to improve: Balance, Coordination, and Proprioception for 0 minutes. The following activities were included:      therapeutic activities to improve functional performance for 0  minutes, including:      gait training to improve functional mobility and safety for 0  minutes, including:      Patient Education and Home Exercises     Home Exercises Provided and Patient Education Provided     Education provided:   - None    Written Home Exercises Provided: Patient instructed to cont prior HEP. Exercises were reviewed and Kat was able to demonstrate them prior to the end of the session.  Kat demonstrated good  understanding of the education provided. See EMR under Patient Instructions for exercises provided during therapy sessions    ASSESSMENT     Pt tolerated all therapeutic exercises and Graston today with min. C/o pain noted. Graston performed today using GT-3 and GT-4 instrument utilizing strum, scoop, and sweep techniques along distal achilles and along calcaneus. Pt has moderate adhesions and tightness noted along medial achilles and some tenderness when over bone spur. Ended session with stretching and light strengthening with some pain noted. Educated pt to keep up with her HEP diligently and will progress as able.      P.M.H:  Kat is a 62 y.o. female referred to outpatient Physical Therapy with a medical diagnosis of Carlos's deformity with spurring. Kat reports: She has had problems with Achilles tendonitis over the past 10 years, but the Right foot started hurting more severely approx 4 weeks ago. MD performed X Ray and MRI. Achilles tendon appears to be intact. Multiple spurs noted along the Calcaneus. Patient was diagnoses with Carlos's deformity and was referred to Physical Therapy. Patient presents with decreased Range of Motion and  Strength in the Right Ankle. She has pain along the calcaneus and X Rays show multiple spurs. Patient has decreased step/stride length and antalgic gait on the Right. She supinates the Right foot with ambulation in order to offload the calcaneus due to pain with weight bearing. Patient will require Physical Therapy Intervention to address all deficits and work toward completion of all goals set. Therapist will refer patient back to MD upon completion of Therapy for further assessment if pain and dysfunction persist.      Kat Is progressing towards her goals.   Pt prognosis is Good.     Pt will continue to benefit from skilled outpatient physical therapy to address the deficits listed in the problem list box on initial evaluation, provide pt/family education and to maximize pt's level of independence in the home and community environment.     Pt's spiritual, cultural and educational needs considered and pt agreeable to plan of care and goals.     Anticipated barriers to physical therapy: None    Goals: Short Term Goals: 3 weeks   1. Independent with Home Exercise Program   2. Increase Right Ankle Active Range of Motion to 15 Degrees for Dorsiflexion and 45 Degrees for Plantarflexion  3. Increase Right Ankle Active Range of Motion to 30 Degrees for Inversion and 20 Degrees for Eversion  4. Increase Right Ankle Strength to grossly 4/5  5. Patient will ambulate 500 feet with stance approx equal from Right to Left with complaints of pain Less than or equal to 4/10.     Long Term Goals: 6 weeks   1. Patient will increase Right Ankle Strength to grossly 4+/5  2. Patient will increase Right Ankle Active Range of Motion to Within Normal Limits all directions   3. Patient will ambulate 1000+ feet with no complaints of pain or weakness in Right Ankle        PLAN     Plan of care Certification: 10/12/2022 to 11/25/2022.     Outpatient Physical Therapy 2 times weekly for 6 weeks to include the following interventions:  Electrical Stimulation to decrease pain and inflammation as needed, Gait Training, Iontophoresis, Manual Therapy, Moist Heat/ Ice, Neuromuscular Re-ed, Patient Education, Therapeutic Activities, Therapeutic Exercise, and Ultrasound.     Rafi Ferrari, PTA   10/19/2022

## 2022-10-21 ENCOUNTER — CLINICAL SUPPORT (OUTPATIENT)
Dept: REHABILITATION | Facility: HOSPITAL | Age: 62
End: 2022-10-21
Attending: FAMILY MEDICINE
Payer: COMMERCIAL

## 2022-10-21 DIAGNOSIS — M92.61 HAGLUND'S DEFORMITY OF RIGHT HEEL: Primary | ICD-10-CM

## 2022-10-21 DIAGNOSIS — M25.671 DECREASED RANGE OF MOTION OF RIGHT ANKLE: ICD-10-CM

## 2022-10-21 DIAGNOSIS — R29.898 WEAKNESS OF RIGHT LEG: ICD-10-CM

## 2022-10-21 DIAGNOSIS — M79.671 PAIN OF RIGHT HEEL: ICD-10-CM

## 2022-10-21 PROCEDURE — 97110 THERAPEUTIC EXERCISES: CPT

## 2022-10-21 PROCEDURE — 97140 MANUAL THERAPY 1/> REGIONS: CPT

## 2022-10-21 NOTE — PROGRESS NOTES
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  Response to previous treatment: mild soreness  Functional change: None    Pain: 1-2/10  Location: right ankles and feet      Prior Level of Function: Walks apprx 3-5 miles per day at work  Current Level of Function: Pain with walking     OBJECTIVE     Objective Measures updated at progress report unless specified.     Treatment     Kat received the treatments listed below:      therapeutic exercises to develop strength, endurance, and ROM for 30 minutes including:  Bike 5 minutes  SB Gastroc - 4 x 20 seconds  SB Soleus - 4 x 20 seconds  HAMSTRING STRETCH stretch - 4 x 20 seconds     Ankle 4 way - Active Range of Motion / Resisted   Dorsiflexion   Plantarflexion   Inversion   Eversion   Circles      Hydraulic Ankle 4 way 3 x 10     Achilles Stretch on Stairs - 4 x 15 seconds  Heel Raises on Stairs - 4 x 10      Prostretch in Standing 4 x 15 seconds      Single Leg Stance - Firm - 3 x 30 seconds  Single Leg Stance - Foam     manual therapy techniques: Joint mobilizations and Myofacial release were applied to the: foot/ankle for 25 minutes, including:    Graston Technique to right posterior and plantar calcaneous and achillis tendon using GT 3 and 6 with  sweep, strum and framing strokes    neuromuscular re-education activities to improve: Balance, Coordination, and Proprioception for 0 minutes. The following activities were included:      therapeutic activities to improve functional performance for 0  minutes, including:      gait training to improve functional mobility and safety for 0  minutes, including:      Patient Education and Home Exercises     Home Exercises Provided and Patient Education Provided     Education provided:   - None    Written Home Exercises Provided: Patient instructed to cont prior HEP. Exercises were reviewed and Kat was able to demonstrate them prior to the end of the session.  Kat demonstrated good  understanding of the education provided. See EMR under Patient Instructions for exercises provided during therapy sessions    ASSESSMENT     Performed Graston technique to right posterior and plantar calcaneous and achillis tendon using GT 3 and 6 with sweep, strum and framing strokes. Was able to get down to calcaneal osteophyte with patient reporting moderate pain. Added achillis pro stretch and hydraulic 4-way.       P.M.H:  Kat is a 62 y.o. female referred to outpatient Physical Therapy with a medical diagnosis of Carlos's deformity with spurring. Kat reports: She has had problems with Achilles tendonitis over the past 10 years, but the Right foot started hurting more severely approx 4 weeks ago. MD performed X Ray and MRI. Achilles tendon appears to be intact. Multiple spurs noted along the Calcaneus. Patient was diagnoses with Carlos's deformity and was referred to Physical Therapy. Patient presents with decreased Range of Motion and Strength in the Right Ankle. She has pain along the calcaneus and X Rays show multiple spurs. Patient has decreased step/stride length and antalgic gait on the Right. She supinates the Right foot with ambulation in order to offload the calcaneus due to pain with weight bearing. Patient will require  Physical Therapy Intervention to address all deficits and work toward completion of all goals set. Therapist will refer patient back to MD upon completion of Therapy for further assessment if pain and dysfunction persist.      Kat Is progressing towards her goals.   Pt prognosis is Good.     Pt will continue to benefit from skilled outpatient physical therapy to address the deficits listed in the problem list box on initial evaluation, provide pt/family education and to maximize pt's level of independence in the home and community environment.     Pt's spiritual, cultural and educational needs considered and pt agreeable to plan of care and goals.     Anticipated barriers to physical therapy: None    Goals: Short Term Goals: 3 weeks   1. Independent with Home Exercise Program   2. Increase Right Ankle Active Range of Motion to 15 Degrees for Dorsiflexion and 45 Degrees for Plantarflexion  3. Increase Right Ankle Active Range of Motion to 30 Degrees for Inversion and 20 Degrees for Eversion  4. Increase Right Ankle Strength to grossly 4/5  5. Patient will ambulate 500 feet with stance approx equal from Right to Left with complaints of pain Less than or equal to 4/10.     Long Term Goals: 6 weeks   1. Patient will increase Right Ankle Strength to grossly 4+/5  2. Patient will increase Right Ankle Active Range of Motion to Within Normal Limits all directions   3. Patient will ambulate 1000+ feet with no complaints of pain or weakness in Right Ankle        PLAN     Plan of care Certification: 10/12/2022 to 11/25/2022.     Outpatient Physical Therapy 2 times weekly for 6 weeks to include the following interventions: Electrical Stimulation to decrease pain and inflammation as needed, Gait Training, Iontophoresis, Manual Therapy, Moist Heat/ Ice, Neuromuscular Re-ed, Patient Education, Therapeutic Activities, Therapeutic Exercise, and Ultrasound.     SHARON OLIVARES, PT, MLT    10/21/2022

## 2022-10-25 ENCOUNTER — CLINICAL SUPPORT (OUTPATIENT)
Dept: REHABILITATION | Facility: HOSPITAL | Age: 62
End: 2022-10-25
Attending: FAMILY MEDICINE
Payer: COMMERCIAL

## 2022-10-25 DIAGNOSIS — M79.671 PAIN OF RIGHT HEEL: ICD-10-CM

## 2022-10-25 DIAGNOSIS — R29.898 WEAKNESS OF RIGHT LEG: ICD-10-CM

## 2022-10-25 DIAGNOSIS — M25.671 DECREASED RANGE OF MOTION OF RIGHT ANKLE: ICD-10-CM

## 2022-10-25 DIAGNOSIS — M92.61 HAGLUND'S DEFORMITY OF RIGHT HEEL: Primary | ICD-10-CM

## 2022-10-25 PROCEDURE — 97110 THERAPEUTIC EXERCISES: CPT

## 2022-10-25 PROCEDURE — 97140 MANUAL THERAPY 1/> REGIONS: CPT

## 2022-10-25 NOTE — PROGRESS NOTES
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  Response to previous treatment: mild soreness  Functional change: None    Pain: 2/10  Location: right ankles and feet      Prior Level of Function: Walks apprx 3-5 miles per day at work  Current Level of Function: Pain with walking     OBJECTIVE     Objective Measures updated at progress report unless specified.     Treatment     Kat received the treatments listed below:      therapeutic exercises to develop strength, endurance, and ROM for 30 minutes including:  Bike 5 minutes  SB Gastroc - 4 x 20 seconds  SB Soleus - 4 x 20 seconds  HAMSTRING STRETCH stretch - 4 x 20 seconds     Ankle 4 way - Active Range of Motion / Resisted   Dorsiflexion   Plantarflexion   Inversion   Eversion   Circles      Hydraulic Ankle 4 way 3 x 10     Achilles Stretch on Stairs - 4 x 15 seconds  Heel Raises on Stairs - 4 x 10      Prostretch in Standing 4 x 15 seconds      Single Leg Stance - Firm - 3 x 30 seconds  Single Leg Stance - Foam     manual therapy techniques: Joint mobilizations and Myofacial release were applied to the: foot/ankle for 25 minutes, including:    Graston Technique to right posterior and plantar calcaneous and achillis tendon using GT 3 and 6 with  sweep, strum and framing strokes    neuromuscular re-education activities to improve: Balance, Coordination, and Proprioception for 0 minutes. The following activities were included:      therapeutic activities to improve functional performance for 0  minutes, including:      gait training to improve functional mobility and safety for 0  minutes, including:      Patient Education and Home Exercises     Home Exercises Provided and Patient Education Provided     Education provided:   - None    Written Home Exercises Provided: Patient instructed to cont prior HEP. Exercises were reviewed and Kat was able to demonstrate them prior to the end of the session.  Kat demonstrated good  understanding of the education provided. See EMR under Patient Instructions for exercises provided during therapy sessions    ASSESSMENT     Performed Graston technique to right posterior and plantar calcaneous and achillis tendon using GT 3 and 6 with sweep, strum and framing strokes. Was able to get down to calcaneal osteophyte with patient reporting moderate pain. Patient is responding well to treatment with decreased pain, improved RANGE OF MOTION and strength.       P.M.H:  Kat is a 62 y.o. female referred to outpatient Physical Therapy with a medical diagnosis of Carlos's deformity with spurring. Kat reports: She has had problems with Achilles tendonitis over the past 10 years, but the Right foot started hurting more severely approx 4 weeks ago. MD performed X Ray and MRI. Achilles tendon appears to be intact. Multiple spurs noted along the Calcaneus. Patient was diagnoses with Carlos's deformity and was referred to Physical Therapy. Patient presents with decreased Range of Motion and Strength in the Right Ankle. She has pain along the calcaneus and X Rays show multiple spurs. Patient has decreased step/stride length and antalgic gait on the Right. She supinates the Right foot with ambulation in order to offload the calcaneus  due to pain with weight bearing. Patient will require Physical Therapy Intervention to address all deficits and work toward completion of all goals set. Therapist will refer patient back to MD upon completion of Therapy for further assessment if pain and dysfunction persist.      Kat Is progressing towards her goals.   Pt prognosis is Good.     Pt will continue to benefit from skilled outpatient physical therapy to address the deficits listed in the problem list box on initial evaluation, provide pt/family education and to maximize pt's level of independence in the home and community environment.     Pt's spiritual, cultural and educational needs considered and pt agreeable to plan of care and goals.     Anticipated barriers to physical therapy: None    Goals: Short Term Goals: 3 weeks   1. Independent with Home Exercise Program   2. Increase Right Ankle Active Range of Motion to 15 Degrees for Dorsiflexion and 45 Degrees for Plantarflexion  3. Increase Right Ankle Active Range of Motion to 30 Degrees for Inversion and 20 Degrees for Eversion  4. Increase Right Ankle Strength to grossly 4/5  5. Patient will ambulate 500 feet with stance approx equal from Right to Left with complaints of pain Less than or equal to 4/10.     Long Term Goals: 6 weeks   1. Patient will increase Right Ankle Strength to grossly 4+/5  2. Patient will increase Right Ankle Active Range of Motion to Within Normal Limits all directions   3. Patient will ambulate 1000+ feet with no complaints of pain or weakness in Right Ankle        PLAN     Plan of care Certification: 10/12/2022 to 11/25/2022.     Outpatient Physical Therapy 2 times weekly for 6 weeks to include the following interventions: Electrical Stimulation to decrease pain and inflammation as needed, Gait Training, Iontophoresis, Manual Therapy, Moist Heat/ Ice, Neuromuscular Re-ed, Patient Education, Therapeutic Activities, Therapeutic Exercise, and Ultrasound.     SHARON OLIVARES, PT,  MLT    10/25/2022

## 2022-10-28 ENCOUNTER — CLINICAL SUPPORT (OUTPATIENT)
Dept: REHABILITATION | Facility: HOSPITAL | Age: 62
End: 2022-10-28
Attending: FAMILY MEDICINE
Payer: COMMERCIAL

## 2022-10-28 DIAGNOSIS — M79.671 PAIN OF RIGHT HEEL: ICD-10-CM

## 2022-10-28 DIAGNOSIS — M92.61 HAGLUND'S DEFORMITY OF RIGHT HEEL: Primary | ICD-10-CM

## 2022-10-28 DIAGNOSIS — R29.898 WEAKNESS OF RIGHT LEG: ICD-10-CM

## 2022-10-28 DIAGNOSIS — M25.671 DECREASED RANGE OF MOTION OF RIGHT ANKLE: ICD-10-CM

## 2022-10-28 PROCEDURE — 97110 THERAPEUTIC EXERCISES: CPT

## 2022-10-28 PROCEDURE — 97140 MANUAL THERAPY 1/> REGIONS: CPT

## 2022-10-28 NOTE — PROGRESS NOTES
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 She walked 3.5 miles last night per her watch.  She was compliant with home exercise program.  Response to previous treatment: mild soreness  Functional change: None    Pain: 4/10  Location: right ankles and feet      Prior Level of Function: Walks apprx 3-5 miles per day at work  Current Level of Function: Pain with walking     OBJECTIVE     Objective Measures updated at progress report unless specified.     Treatment     Kat received the treatments listed below:      therapeutic exercises to develop strength, endurance, and ROM for 20 minutes including:  Bike 5 minutes  SB Gastroc - 4 x 20 seconds  SB Soleus - 4 x 20 seconds  HAMSTRING STRETCH stretch - 4 x 20 seconds     Ankle 4 way - Active Range of Motion / Resisted   Dorsiflexion   Plantarflexion   Inversion   Eversion   Circles      Hydraulic Ankle 4 way 3 x 10     Achilles Stretch on Stairs - 4 x 15 seconds  Heel Raises on Stairs - 4 x 10      Prostretch in Standing 4 x 15 seconds      Single Leg Stance - Firm - 3 x 30 seconds  Single Leg Stance - Foam     manual therapy techniques: Joint mobilizations and Myofacial release were applied to the: foot/ankle for 25 minutes, including:    Graston Technique to right  posterior and plantar calcaneous and achillis tendon using GT 3 and 6 with sweep, strum and framing strokes    neuromuscular re-education activities to improve: Balance, Coordination, and Proprioception for 0 minutes. The following activities were included:      therapeutic activities to improve functional performance for 0  minutes, including:      gait training to improve functional mobility and safety for 0  minutes, including:      Patient Education and Home Exercises     Home Exercises Provided and Patient Education Provided     Education provided:   - None    Written Home Exercises Provided: Patient instructed to cont prior HEP. Exercises were reviewed and Kat was able to demonstrate them prior to the end of the session.  Kat demonstrated good  understanding of the education provided. See EMR under Patient Instructions for exercises provided during therapy sessions    ASSESSMENT     Patient worked 12 hours last night and her heel is more sore today, but overall it has been feeling better. Patient is tolerating Graston well. Able to detect the spur with GT #3 and #6. Had patient perform exercises and stretches following Graston to work the tissue. Will continue with the current Plan of Care.           P.M.H:  Kat is a 62 y.o. female referred to outpatient Physical Therapy with a medical diagnosis of Carlos's deformity with spurring. Kat reports: She has had problems with Achilles tendonitis over the past 10 years, but the Right foot started hurting more severely approx 4 weeks ago. MD performed X Ray and MRI. Achilles tendon appears to be intact. Multiple spurs noted along the Calcaneus. Patient was diagnoses with Carlos's deformity and was referred to Physical Therapy. Patient presents with decreased Range of Motion and Strength in the Right Ankle. She has pain along the calcaneus and X Rays show multiple spurs. Patient has decreased step/stride length and antalgic gait on the Right. She supinates  the Right foot with ambulation in order to offload the calcaneus due to pain with weight bearing. Patient will require Physical Therapy Intervention to address all deficits and work toward completion of all goals set. Therapist will refer patient back to MD upon completion of Therapy for further assessment if pain and dysfunction persist.      Kat Is progressing towards her goals.   Pt prognosis is Good.     Pt will continue to benefit from skilled outpatient physical therapy to address the deficits listed in the problem list box on initial evaluation, provide pt/family education and to maximize pt's level of independence in the home and community environment.     Pt's spiritual, cultural and educational needs considered and pt agreeable to plan of care and goals.     Anticipated barriers to physical therapy: None    Goals: Short Term Goals: 3 weeks   1. Independent with Home Exercise Program   2. Increase Right Ankle Active Range of Motion to 15 Degrees for Dorsiflexion and 45 Degrees for Plantarflexion  3. Increase Right Ankle Active Range of Motion to 30 Degrees for Inversion and 20 Degrees for Eversion  4. Increase Right Ankle Strength to grossly 4/5  5. Patient will ambulate 500 feet with stance approx equal from Right to Left with complaints of pain Less than or equal to 4/10.     Long Term Goals: 6 weeks   1. Patient will increase Right Ankle Strength to grossly 4+/5  2. Patient will increase Right Ankle Active Range of Motion to Within Normal Limits all directions   3. Patient will ambulate 1000+ feet with no complaints of pain or weakness in Right Ankle        PLAN     Plan of care Certification: 10/12/2022 to 11/25/2022.     Outpatient Physical Therapy 2 times weekly for 6 weeks to include the following interventions: Electrical Stimulation to decrease pain and inflammation as needed, Gait Training, Iontophoresis, Manual Therapy, Moist Heat/ Ice, Neuromuscular Re-ed, Patient Education, Therapeutic  Activities, Therapeutic Exercise, and Ultrasound.     TRAMAINE OLIVARES, PT, DPT    10/28/2022

## 2022-11-04 ENCOUNTER — CLINICAL SUPPORT (OUTPATIENT)
Dept: REHABILITATION | Facility: HOSPITAL | Age: 62
End: 2022-11-04
Attending: FAMILY MEDICINE
Payer: COMMERCIAL

## 2022-11-04 DIAGNOSIS — M25.671 DECREASED RANGE OF MOTION OF RIGHT ANKLE: ICD-10-CM

## 2022-11-04 DIAGNOSIS — M79.671 PAIN OF RIGHT HEEL: ICD-10-CM

## 2022-11-04 DIAGNOSIS — R29.898 WEAKNESS OF RIGHT LEG: ICD-10-CM

## 2022-11-04 DIAGNOSIS — M92.61 HAGLUND'S DEFORMITY OF RIGHT HEEL: Primary | ICD-10-CM

## 2022-11-04 PROCEDURE — 97110 THERAPEUTIC EXERCISES: CPT | Mod: CQ

## 2022-11-04 PROCEDURE — 97140 MANUAL THERAPY 1/> REGIONS: CPT | Mod: CQ

## 2022-11-04 NOTE — PROGRESS NOTES
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    She was compliant with home exercise program.  Response to previous treatment: mild soreness  Functional change: None    Pain: 4/10  Location: right ankles and feet      Prior Level of Function: Walks apprx 3-5 miles per day at work  Current Level of Function: Pain with walking     OBJECTIVE     Objective Measures updated at progress report unless specified.     Treatment     Kat received the treatments listed below:      therapeutic exercises to develop strength, endurance, and ROM for 28 minutes including:  Bike 5 minutes  SB Gastroc - 4 x 20 seconds  SB Soleus - 4 x 20 seconds  HAMSTRING STRETCH stretch - 4 x 20 seconds  PROSTRETCH in standing 3 x 30 second hold RLE    Ankle 4 way - Active Range of Motion / Resisted   Dorsiflexion   Plantarflexion   Inversion   Eversion   Circles      Hydraulic Ankle 4 way 3 x 10     Achilles Stretch on Stairs - 4 x 15 seconds  Heel Raises on Stairs - 4 x 10      Prostretch in Standing 4 x 15 seconds      Single Leg Stance - Firm - 3 x 30 seconds NTV  Single Leg Stance - Foam     manual therapy techniques: Joint mobilizations and Myofacial release were applied to the: foot/ankle for 25 minutes, including:    IASTM to right posterior and  plantar calcaneous and achillis tendon using GT 3 and 6 with sweep, strum and framing strokes    neuromuscular re-education activities to improve: Balance, Coordination, and Proprioception for 0 minutes. The following activities were included:      therapeutic activities to improve functional performance for 0  minutes, including:      gait training to improve functional mobility and safety for 0  minutes, including:      Patient Education and Home Exercises     Home Exercises Provided and Patient Education Provided     Education provided:   - None    Written Home Exercises Provided: Patient instructed to cont prior HEP. Exercises were reviewed and Kat was able to demonstrate them prior to the end of the session.  Kat demonstrated good  understanding of the education provided. See EMR under Patient Instructions for exercises provided during therapy sessions    ASSESSMENT     Patient has soreness concentrated laterally along posterior calcaneus today but overall it has been feeling better. Patient is tolerating IASTM well. Able to detect the spur with GT #3 and #6. Had patient perform exercises and stretches following IASTM today to work the tissue. Will continue with the current Plan of Care.       P.M.H:  Kat is a 62 y.o. female referred to outpatient Physical Therapy with a medical diagnosis of Carlos's deformity with spurring. Kat reports: She has had problems with Achilles tendonitis over the past 10 years, but the Right foot started hurting more severely approx 4 weeks ago. MD performed X Ray and MRI. Achilles tendon appears to be intact. Multiple spurs noted along the Calcaneus. Patient was diagnoses with Carlos's deformity and was referred to Physical Therapy. Patient presents with decreased Range of Motion and Strength in the Right Ankle. She has pain along the calcaneus and X Rays show multiple spurs. Patient has decreased step/stride length and antalgic gait on the Right. She supinates the Right  foot with ambulation in order to offload the calcaneus due to pain with weight bearing. Patient will require Physical Therapy Intervention to address all deficits and work toward completion of all goals set. Therapist will refer patient back to MD upon completion of Therapy for further assessment if pain and dysfunction persist.      Kat Is progressing towards her goals.   Pt prognosis is Good.     Pt will continue to benefit from skilled outpatient physical therapy to address the deficits listed in the problem list box on initial evaluation, provide pt/family education and to maximize pt's level of independence in the home and community environment.     Pt's spiritual, cultural and educational needs considered and pt agreeable to plan of care and goals.     Anticipated barriers to physical therapy: None    Goals: Short Term Goals: 3 weeks   1. Independent with Home Exercise Program   2. Increase Right Ankle Active Range of Motion to 15 Degrees for Dorsiflexion and 45 Degrees for Plantarflexion  3. Increase Right Ankle Active Range of Motion to 30 Degrees for Inversion and 20 Degrees for Eversion  4. Increase Right Ankle Strength to grossly 4/5  5. Patient will ambulate 500 feet with stance approx equal from Right to Left with complaints of pain Less than or equal to 4/10.     Long Term Goals: 6 weeks   1. Patient will increase Right Ankle Strength to grossly 4+/5  2. Patient will increase Right Ankle Active Range of Motion to Within Normal Limits all directions   3. Patient will ambulate 1000+ feet with no complaints of pain or weakness in Right Ankle        PLAN     Plan of care Certification: 10/12/2022 to 11/25/2022.     Outpatient Physical Therapy 2 times weekly for 6 weeks to include the following interventions: Electrical Stimulation to decrease pain and inflammation as needed, Gait Training, Iontophoresis, Manual Therapy, Moist Heat/ Ice, Neuromuscular Re-ed, Patient Education, Therapeutic Activities,  Therapeutic Exercise, and Ultrasound.     Blayne Calle, PTA    11/04/2022

## 2022-11-07 PROBLEM — M76.71 PERONEAL TENDINITIS OF RIGHT LOWER EXTREMITY: Status: ACTIVE | Noted: 2022-11-07

## 2022-11-11 ENCOUNTER — CLINICAL SUPPORT (OUTPATIENT)
Dept: REHABILITATION | Facility: HOSPITAL | Age: 62
End: 2022-11-11
Attending: FAMILY MEDICINE
Payer: COMMERCIAL

## 2022-11-11 DIAGNOSIS — M79.671 PAIN OF RIGHT HEEL: ICD-10-CM

## 2022-11-11 DIAGNOSIS — M92.61 HAGLUND'S DEFORMITY OF RIGHT HEEL: Primary | ICD-10-CM

## 2022-11-11 DIAGNOSIS — M25.671 DECREASED RANGE OF MOTION OF RIGHT ANKLE: ICD-10-CM

## 2022-11-11 DIAGNOSIS — R29.898 WEAKNESS OF RIGHT LEG: ICD-10-CM

## 2022-11-11 PROCEDURE — 97110 THERAPEUTIC EXERCISES: CPT | Mod: CQ

## 2022-11-11 PROCEDURE — 97140 MANUAL THERAPY 1/> REGIONS: CPT | Mod: CQ

## 2022-11-11 NOTE — PLAN OF CARE
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 She reports she walked around 4 miles last night at work. She saw MD for injection on 11/7.    She was compliant with home exercise program.  Response to previous treatment: mild soreness  Functional change: None    Pain: 4-5/10  Location: right ankles and feet      Prior Level of Function: Walks apprx 3-5 miles per day at work  Current Level of Function: Pain with walking     OBJECTIVE     Objective Measures updated at progress report unless specified.     Treatment     Kat received the treatments listed below:      therapeutic exercises to develop strength, endurance, and ROM for 30 minutes including:  Bike 5 minutes  SB Gastroc - 4 x 20 seconds  SB Soleus - 4 x 20 seconds  HAMSTRING STRETCH stretch - 4 x 20 seconds  PROSTRETCH in standing 3 x 30 second hold RLE    Ankle 4 way - Active Range of Motion / Resisted   Dorsiflexion   Plantarflexion   Inversion   Eversion   Circles      Hydraulic Ankle 4 way 3 x 10     Achilles Stretch on Stairs - 4 x 15 seconds  Heel Raises on Stairs - 4 x 10      Prostretch in Standing 4 x 15 seconds      Single Leg Stance - Firm - 3 x 30 seconds NTV  Single Leg Stance - Foam     manual therapy techniques: Joint mobilizations and Myofacial release were applied to  the: foot/ankle for 25 minutes, including:    IASTM to right posterior and plantar calcaneous and achillis tendon using GT 3 and 6 with sweep, strum and framing strokes    neuromuscular re-education activities to improve: Balance, Coordination, and Proprioception for 0 minutes. The following activities were included:      therapeutic activities to improve functional performance for 0  minutes, including:      gait training to improve functional mobility and safety for 0  minutes, including:      Patient Education and Home Exercises     Home Exercises Provided and Patient Education Provided     Education provided:   - None    Written Home Exercises Provided: Patient instructed to cont prior HEP. Exercises were reviewed and Kat was able to demonstrate them prior to the end of the session.  Kat demonstrated good  understanding of the education provided. See EMR under Patient Instructions for exercises provided during therapy sessions    ASSESSMENT      Right  Lower Extremity   At evaluation 10/12 ROM/Strength Right lower Extremity  11/11                                                                                                                                                                                                                 AROM PROM STRENGTH   AROM PROM STRENGTH                                                          10  12 5/5 ANKLE  Dorsiflexion     (20)       11 12 3+/5   30  35                  Plantarflexion  (50) 40 45     25  30                  Inversion         (35)     28 30     16                    Eversion          (25) 28                                                             Patient has received 7 visits of formal physical therapy since her evaluation on 10/12. 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 Therapist has been working on spur along medial calcaneus. She saw MD for injection on 11/7. She reports pain and  stiffness upon arrival today due to waking 4 miles at work last night. She is making good progress towards her goals of range of motion and strength as noted above with updated measurements taken. However she does have pain that has continued to remain and is concentrated laterally along posterior calcaneus. Overall it has been feeling better at the end of therapy sessions due to increased movement from manual soft tissue mobilizations and myofascial release. Patient is tolerating IASTM well. Able to detect the spur with GT #3 and #6. Had patient perform exercises and stretches following IASTM today to work the tissue. Will continue to progress as tolerated when she returns.       P.M.H:  Kat is a 62 y.o. female referred to outpatient Physical Therapy with a medical diagnosis of Carlos's deformity with spurring. Kat reports: She has had problems with Achilles tendonitis over the past 10 years, but the Right foot started hurting more severely approx 4 weeks ago. MD performed X Ray and MRI. Achilles tendon appears to be intact. Multiple spurs noted along the Calcaneus. Patient was diagnoses with Carlos's deformity and was referred to Physical Therapy. Patient presents with decreased Range of Motion and Strength in the Right Ankle. She has pain along the calcaneus and X Rays show multiple spurs. Patient has decreased step/stride length and antalgic gait on the Right. She supinates the Right foot with ambulation in order to offload the calcaneus due to pain with weight bearing. Patient will require Physical Therapy Intervention to address all deficits and work toward completion of all goals set. Therapist will refer patient back to MD upon completion of Therapy for further assessment if pain and dysfunction persist.      Kat Is progressing towards her goals.   Pt prognosis is Good.     Pt's spiritual, cultural and educational needs considered and pt agreeable to plan of care and goals.     Anticipated barriers  to physical therapy: None    Goals: Short Term Goals: 3 weeks   1. Independent with Home Exercise Program - MET   2. Increase Right Ankle Active Range of Motion to 15 Degrees for Dorsiflexion and 45 Degrees for Plantarflexion- ONGOING  3. Increase Right Ankle Active Range of Motion to 30 Degrees for Inversion and 20 Degrees for Eversion - Partially met  4. Increase Right Ankle Strength to grossly 4/5 - ONGOING  5. Patient will ambulate 500 feet with stance approx equal from Right to Left with complaints of pain Less than or equal to 4/10. - NOT MET     Long Term Goals: 6 weeks   1. Patient will increase Right Ankle Strength to grossly 4+/5  2. Patient will increase Right Ankle Active Range of Motion to Within Normal Limits all directions   3. Patient will ambulate 1000+ feet with no complaints of pain or weakness in Right Ankle      Reasons for Recertification of Therapy: Pt will continue to benefit from skilled outpatient physical therapy to address the deficits listed in the problem list box on initial evaluation, provide pt/family education and to maximize pt's level of independence in the home and community environment.     Plan     Updated Certification Period: 11/11/2022 to 12/10/2022  Recommended Treatment Plan: 2 times per week for 4 weeks: Electrical Stimulation to decrease pain and inflammation as needed, Gait Training, Manual Therapy, Moist Heat/ Ice, Neuromuscular Re-ed, Patient Education, Therapeutic Activities, Therapeutic Exercise, and Ultrasound      TRAMAINE OLIVARES, PT, DPT   11/11/2022      I CERTIFY THE NEED FOR THESE SERVICES FURNISHED UNDER THIS PLAN OF TREATMENT AND WHILE UNDER MY CARE.    Physician's comments:      Physician's Signature: ___________________________________________________

## 2022-11-11 NOTE — PROGRESS NOTES
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 She reports she walked around 4 miles last night at work. She saw MD for injection on 11/7.    She was compliant with home exercise program.  Response to previous treatment: mild soreness  Functional change: None    Pain: 4-5/10  Location: right ankles and feet      Prior Level of Function: Walks apprx 3-5 miles per day at work  Current Level of Function: Pain with walking     OBJECTIVE     Objective Measures updated at progress report unless specified.     Treatment     Kat received the treatments listed below:      therapeutic exercises to develop strength, endurance, and ROM for 30 minutes including:  Bike 5 minutes  SB Gastroc - 4 x 20 seconds  SB Soleus - 4 x 20 seconds  HAMSTRING STRETCH stretch - 4 x 20 seconds  PROSTRETCH in standing 3 x 30 second hold RLE    Ankle 4 way - Active Range of Motion / Resisted   Dorsiflexion   Plantarflexion   Inversion   Eversion   Circles      Hydraulic Ankle 4 way 3 x 10     Achilles Stretch on Stairs - 4 x 15 seconds  Heel Raises on Stairs - 4 x 10      Prostretch in Standing 4 x 15 seconds      Single Leg Stance - Firm - 3 x 30 seconds NTV  Single Leg Stance - Foam     manual therapy techniques: Joint mobilizations and Myofacial release were applied to the:  foot/ankle for 25 minutes, including:    IASTM to right posterior and plantar calcaneous and achillis tendon using GT 3 and 6 with sweep, strum and framing strokes    neuromuscular re-education activities to improve: Balance, Coordination, and Proprioception for 0 minutes. The following activities were included:      therapeutic activities to improve functional performance for 0  minutes, including:      gait training to improve functional mobility and safety for 0  minutes, including:      Patient Education and Home Exercises     Home Exercises Provided and Patient Education Provided     Education provided:   - None    Written Home Exercises Provided: Patient instructed to cont prior HEP. Exercises were reviewed and Kat was able to demonstrate them prior to the end of the session.  Kat demonstrated good  understanding of the education provided. See EMR under Patient Instructions for exercises provided during therapy sessions    ASSESSMENT      Right  Lower Extremity   At evaluation 10/12 ROM/Strength Right lower Extremity  11/11                                                                                                                                                                                                                 AROM PROM STRENGTH   AROM PROM STRENGTH                                                          10  12 5/5 ANKLE  Dorsiflexion     (20)       11 12 3+/5   30  35                  Plantarflexion  (50) 40 45     25  30                  Inversion         (35)     28 30     16                    Eversion          (25) 28                                                             Patient has received 7 visits of formal physical therapy since her evaluation on 10/12. 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 Therapist has been working on spur along medial calcaneus. She saw MD for injection on 11/7. She reports pain and stiffness  upon arrival today due to waking 4 miles at work last night. She is making good progress towards her goals of range of motion and strength as noted above with updated measurements taken. However she does have pain that has continued to remain and is concentrated laterally along posterior calcaneus. Overall it has been feeling better at the end of therapy sessions due to increased movement from manual soft tissue mobilizations and myofascial release. Patient is tolerating IASTM well. Able to detect the spur with GT #3 and #6. Had patient perform exercises and stretches following IASTM today to work the tissue. Will continue to progress as tolerated when she returns.       P.M.H:  Kat is a 62 y.o. female referred to outpatient Physical Therapy with a medical diagnosis of Carlos's deformity with spurring. Kat reports: She has had problems with Achilles tendonitis over the past 10 years, but the Right foot started hurting more severely approx 4 weeks ago. MD performed X Ray and MRI. Achilles tendon appears to be intact. Multiple spurs noted along the Calcaneus. Patient was diagnoses with Carlos's deformity and was referred to Physical Therapy. Patient presents with decreased Range of Motion and Strength in the Right Ankle. She has pain along the calcaneus and X Rays show multiple spurs. Patient has decreased step/stride length and antalgic gait on the Right. She supinates the Right foot with ambulation in order to offload the calcaneus due to pain with weight bearing. Patient will require Physical Therapy Intervention to address all deficits and work toward completion of all goals set. Therapist will refer patient back to MD upon completion of Therapy for further assessment if pain and dysfunction persist.      Kat Is progressing towards her goals.   Pt prognosis is Good.     Pt will continue to benefit from skilled outpatient physical therapy to address the deficits listed in the problem list box on initial  evaluation, provide pt/family education and to maximize pt's level of independence in the home and community environment.     Pt's spiritual, cultural and educational needs considered and pt agreeable to plan of care and goals.     Anticipated barriers to physical therapy: None    Goals: Short Term Goals: 3 weeks   1. Independent with Home Exercise Program - MET   2. Increase Right Ankle Active Range of Motion to 15 Degrees for Dorsiflexion and 45 Degrees for Plantarflexion- ONGOING  3. Increase Right Ankle Active Range of Motion to 30 Degrees for Inversion and 20 Degrees for Eversion - Partially met  4. Increase Right Ankle Strength to grossly 4/5 - ONGOING  5. Patient will ambulate 500 feet with stance approx equal from Right to Left with complaints of pain Less than or equal to 4/10. - NOT MET     Long Term Goals: 6 weeks   1. Patient will increase Right Ankle Strength to grossly 4+/5  2. Patient will increase Right Ankle Active Range of Motion to Within Normal Limits all directions   3. Patient will ambulate 1000+ feet with no complaints of pain or weakness in Right Ankle        PLAN     Updated Certification Period: 11/11/2022 to 12/10/2022  Recommended Treatment Plan: 2 times per week for 4 weeks: Electrical Stimulation to decrease pain and inflammation as needed, Gait Training, Manual Therapy, Moist Heat/ Ice, Neuromuscular Re-ed, Patient Education, Therapeutic Activities, Therapeutic Exercise, and Ultrasound    Blayne Calle, JENELLE    11/11/2022

## 2022-11-15 ENCOUNTER — CLINICAL SUPPORT (OUTPATIENT)
Dept: REHABILITATION | Facility: HOSPITAL | Age: 62
End: 2022-11-15
Attending: FAMILY MEDICINE
Payer: COMMERCIAL

## 2022-11-15 DIAGNOSIS — M25.671 DECREASED RANGE OF MOTION OF RIGHT ANKLE: ICD-10-CM

## 2022-11-15 DIAGNOSIS — M79.671 PAIN OF RIGHT HEEL: ICD-10-CM

## 2022-11-15 DIAGNOSIS — R29.898 WEAKNESS OF RIGHT LEG: ICD-10-CM

## 2022-11-15 DIAGNOSIS — M92.61 HAGLUND'S DEFORMITY OF RIGHT HEEL: Primary | ICD-10-CM

## 2022-11-15 PROCEDURE — 97140 MANUAL THERAPY 1/> REGIONS: CPT

## 2022-11-15 PROCEDURE — 97110 THERAPEUTIC EXERCISES: CPT

## 2022-11-15 NOTE — PROGRESS NOTES
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 Her foot is hurting this morning. She reports the injection did help some. She reports the injection helped with her range of motion more than with the pain.   She was compliant with home exercise program.  Response to previous treatment: mild soreness  Functional change: None    Pain: 3/10  Location: right ankles and feet      Prior Level of Function: Walks apprx 3-5 miles per day at work  Current Level of Function: Pain with walking     OBJECTIVE     Objective Measures updated at progress report unless specified.     Treatment     Kat received the treatments listed below:      therapeutic exercises to develop strength, endurance, and ROM for 28 minutes including:  Bike 5 minutes  SB Gastroc - 4 x 20 seconds  SB Soleus - 4 x 20 seconds  HAMSTRING STRETCH stretch - 4 x 20 seconds  PROSTRETCH in standing 3 x 30 second hold RLE    Ankle 4 way - Active Range of Motion / Resisted   Dorsiflexion   Plantarflexion   Inversion   Eversion   Circles      Hydraulic Ankle 4 way 3 x 10     Achilles Stretch on Stairs - 4 x 15 seconds  Heel Raises on Stairs - 4 x 10      Prostretch in Standing 4 x 15 seconds      Single Leg Stance - Firm - 3 x 30 seconds NTV  Single Leg Stance - Foam     manual therapy techniques: Joint mobilizations and  Myofacial release were applied to the: foot/ankle for 25 minutes, including:    IASTM to right posterior and plantar calcaneous and achillis tendon using GT 3 and 6 with sweep, strum and framing strokes    neuromuscular re-education activities to improve: Balance, Coordination, and Proprioception for 0 minutes. The following activities were included:      therapeutic activities to improve functional performance for 0  minutes, including:      gait training to improve functional mobility and safety for 0  minutes, including:      Patient Education and Home Exercises     Home Exercises Provided and Patient Education Provided     Education provided:   - None    Written Home Exercises Provided: Patient instructed to cont prior HEP. Exercises were reviewed and Kat was able to demonstrate them prior to the end of the session.  Kat demonstrated good  understanding of the education provided. See EMR under Patient Instructions for exercises provided during therapy sessions    ASSESSMENT      Patient reports the injection did help some. She reports the injection helped with her range of motion more than with the pain. Therapist performed Graston to Heel and Achilles. She is very tender with Graston around bone spurs as expected. She does tolerate this well. Therapist followed Graston with exercises and stretches. Will continue to work with patient to increase strength and Range of Motion and to decrease pain as able.           Right  Lower Extremity   At evaluation 10/12 ROM/Strength Right lower Extremity  11/11                                                                                                                                                                                                                 AROM PROM STRENGTH   AROM PROM STRENGTH                                                          10  12 5/5 ANKLE  Dorsiflexion     (20)       11 12 3+/5   30  35                  Plantarflexion  (50) 40 45      25  30                  Inversion         (35)     28 30     16                    Eversion          (25) 28                                                                   P.M.H:  Kat is a 62 y.o. female referred to outpatient Physical Therapy with a medical diagnosis of Carlos's deformity with spurring. Kat reports: She has had problems with Achilles tendonitis over the past 10 years, but the Right foot started hurting more severely approx 4 weeks ago. MD performed X Ray and MRI. Achilles tendon appears to be intact. Multiple spurs noted along the Calcaneus. Patient was diagnoses with Carlos's deformity and was referred to Physical Therapy. Patient presents with decreased Range of Motion and Strength in the Right Ankle. She has pain along the calcaneus and X Rays show multiple spurs. Patient has decreased step/stride length and antalgic gait on the Right. She supinates the Right foot with ambulation in order to offload the calcaneus due to pain with weight bearing. Patient will require Physical Therapy Intervention to address all deficits and work toward completion of all goals set. Therapist will refer patient back to MD upon completion of Therapy for further assessment if pain and dysfunction persist.      Kat Is progressing towards her goals.   Pt prognosis is Good.     Pt will continue to benefit from skilled outpatient physical therapy to address the deficits listed in the problem list box on initial evaluation, provide pt/family education and to maximize pt's level of independence in the home and community environment.     Pt's spiritual, cultural and educational needs considered and pt agreeable to plan of care and goals.     Anticipated barriers to physical therapy: None    Goals: Short Term Goals: 3 weeks   1. Independent with Home Exercise Program - MET   2. Increase Right Ankle Active Range of Motion to 15 Degrees for Dorsiflexion and 45 Degrees for Plantarflexion- ONGOING  3. Increase Right  Ankle Active Range of Motion to 30 Degrees for Inversion and 20 Degrees for Eversion - Partially met  4. Increase Right Ankle Strength to grossly 4/5 - ONGOING  5. Patient will ambulate 500 feet with stance approx equal from Right to Left with complaints of pain Less than or equal to 4/10. - NOT MET     Long Term Goals: 6 weeks   1. Patient will increase Right Ankle Strength to grossly 4+/5  2. Patient will increase Right Ankle Active Range of Motion to Within Normal Limits all directions   3. Patient will ambulate 1000+ feet with no complaints of pain or weakness in Right Ankle        PLAN     Updated Certification Period: 11/11/2022 to 12/10/2022  Recommended Treatment Plan: 2 times per week for 4 weeks: Electrical Stimulation to decrease pain and inflammation as needed, Gait Training, Manual Therapy, Moist Heat/ Ice, Neuromuscular Re-ed, Patient Education, Therapeutic Activities, Therapeutic Exercise, and Ultrasound    TRAMAINE OLIVARES, PT, DPT     11/15/2022

## 2022-11-17 ENCOUNTER — CLINICAL SUPPORT (OUTPATIENT)
Dept: REHABILITATION | Facility: HOSPITAL | Age: 62
End: 2022-11-17
Attending: FAMILY MEDICINE
Payer: COMMERCIAL

## 2022-11-17 DIAGNOSIS — M25.671 DECREASED RANGE OF MOTION OF RIGHT ANKLE: ICD-10-CM

## 2022-11-17 DIAGNOSIS — M92.61 HAGLUND'S DEFORMITY OF RIGHT HEEL: Primary | ICD-10-CM

## 2022-11-17 DIAGNOSIS — M79.671 PAIN OF RIGHT HEEL: ICD-10-CM

## 2022-11-17 DIAGNOSIS — R29.898 WEAKNESS OF RIGHT LEG: ICD-10-CM

## 2022-11-17 PROCEDURE — 97140 MANUAL THERAPY 1/> REGIONS: CPT

## 2022-11-17 PROCEDURE — 97110 THERAPEUTIC EXERCISES: CPT

## 2022-11-17 NOTE — PROGRESS NOTES
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  Response to previous treatment: mild soreness  Functional change: None    Pain: 3/10  Location: right ankles and feet      Prior Level of Function: Walks apprx 3-5 miles per day at work  Current Level of Function: Pain with walking     OBJECTIVE     Objective Measures updated at progress report unless specified.     Treatment     Kat received the treatments listed below:      therapeutic exercises to develop strength, endurance, and ROM for 28 minutes including:  Bike 5 minutes  SB Gastroc - 4 x 20 seconds  SB Soleus - 4 x 20 seconds  HAMSTRING STRETCH stretch - 4 x 20 seconds  PROSTRETCH in standing 3 x 30 second hold RLE    Ankle 4 way - Active Range of Motion / Resisted   Dorsiflexion   Plantarflexion   Inversion   Eversion   Circles      Hydraulic Ankle 4 way 3 x 10     Achilles Stretch on Stairs - 4 x 15 seconds  Heel Raises on Stairs - 4 x 10      Prostretch in Standing 4 x 15 seconds      Single Leg Stance - Firm - 3 x 30 seconds NTV  Single Leg Stance - Foam     manual therapy techniques: Joint mobilizations and Myofacial release were applied to the: foot/ankle for 25 minutes, including:    IASTM to right posterior and plantar  calcaneous and achillis tendon using GT 3 and 6 with sweep, strum and framing strokes    neuromuscular re-education activities to improve: Balance, Coordination, and Proprioception for 0 minutes. The following activities were included:      therapeutic activities to improve functional performance for 0  minutes, including:      gait training to improve functional mobility and safety for 0  minutes, including:      Patient Education and Home Exercises     Home Exercises Provided and Patient Education Provided     Education provided:   - None    Written Home Exercises Provided: Patient instructed to cont prior HEP. Exercises were reviewed and Kat was able to demonstrate them prior to the end of the session.  Kat demonstrated good  understanding of the education provided. See EMR under Patient Instructions for exercises provided during therapy sessions    ASSESSMENT      Her foot is feeling a little better today since she did not have to work last night. Therapist performed Graston followed by exercises as listed above. She tolerated today's treatment very well. Will continue with the current Plan of Care.               Right  Lower Extremity   At evaluation 10/12 ROM/Strength Right lower Extremity  11/11                                                                                                                                                                                                                 AROM PROM STRENGTH   AROM PROM STRENGTH                                                          10  12 5/5 ANKLE  Dorsiflexion     (20)       11 12 3+/5   30  35                  Plantarflexion  (50) 40 45     25  30                  Inversion         (35)     28 30     16                    Eversion          (25) 28                                                                   P.M.H:  Kat is a 62 y.o. female referred to outpatient Physical Therapy with a medical diagnosis of Carlos's deformity with  spurring. Kat reports: She has had problems with Achilles tendonitis over the past 10 years, but the Right foot started hurting more severely approx 4 weeks ago. MD performed X Ray and MRI. Achilles tendon appears to be intact. Multiple spurs noted along the Calcaneus. Patient was diagnoses with Carlos's deformity and was referred to Physical Therapy. Patient presents with decreased Range of Motion and Strength in the Right Ankle. She has pain along the calcaneus and X Rays show multiple spurs. Patient has decreased step/stride length and antalgic gait on the Right. She supinates the Right foot with ambulation in order to offload the calcaneus due to pain with weight bearing. Patient will require Physical Therapy Intervention to address all deficits and work toward completion of all goals set. Therapist will refer patient back to MD upon completion of Therapy for further assessment if pain and dysfunction persist.      Kat Is progressing towards her goals.   Pt prognosis is Good.     Pt will continue to benefit from skilled outpatient physical therapy to address the deficits listed in the problem list box on initial evaluation, provide pt/family education and to maximize pt's level of independence in the home and community environment.     Pt's spiritual, cultural and educational needs considered and pt agreeable to plan of care and goals.     Anticipated barriers to physical therapy: None    Goals: Short Term Goals: 3 weeks   1. Independent with Home Exercise Program - MET   2. Increase Right Ankle Active Range of Motion to 15 Degrees for Dorsiflexion and 45 Degrees for Plantarflexion- ONGOING  3. Increase Right Ankle Active Range of Motion to 30 Degrees for Inversion and 20 Degrees for Eversion - Partially met  4. Increase Right Ankle Strength to grossly 4/5 - ONGOING  5. Patient will ambulate 500 feet with stance approx equal from Right to Left with complaints of pain Less than or equal to 4/10. - NOT MET      Long Term Goals: 6 weeks   1. Patient will increase Right Ankle Strength to grossly 4+/5  2. Patient will increase Right Ankle Active Range of Motion to Within Normal Limits all directions   3. Patient will ambulate 1000+ feet with no complaints of pain or weakness in Right Ankle        PLAN     Updated Certification Period: 11/11/2022 to 12/10/2022  Recommended Treatment Plan: 2 times per week for 4 weeks: Electrical Stimulation to decrease pain and inflammation as needed, Gait Training, Manual Therapy, Moist Heat/ Ice, Neuromuscular Re-ed, Patient Education, Therapeutic Activities, Therapeutic Exercise, and Ultrasound    TRAMAINE OLIVARES, PT, DPT     11/17/2022

## 2022-11-22 ENCOUNTER — CLINICAL SUPPORT (OUTPATIENT)
Dept: REHABILITATION | Facility: HOSPITAL | Age: 62
End: 2022-11-22
Attending: FAMILY MEDICINE
Payer: COMMERCIAL

## 2022-11-22 DIAGNOSIS — M79.671 PAIN OF RIGHT HEEL: ICD-10-CM

## 2022-11-22 DIAGNOSIS — M25.671 DECREASED RANGE OF MOTION OF RIGHT ANKLE: ICD-10-CM

## 2022-11-22 DIAGNOSIS — M92.61 HAGLUND'S DEFORMITY OF RIGHT HEEL: Primary | ICD-10-CM

## 2022-11-22 DIAGNOSIS — R29.898 WEAKNESS OF RIGHT LEG: ICD-10-CM

## 2022-11-22 PROCEDURE — 97140 MANUAL THERAPY 1/> REGIONS: CPT

## 2022-11-22 PROCEDURE — 97110 THERAPEUTIC EXERCISES: CPT

## 2022-11-22 NOTE — PROGRESS NOTES
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  Response to previous treatment: mild soreness  Functional change: None    Pain: 2/10  Location: right ankles and feet      Prior Level of Function: Walks apprx 3-5 miles per day at work  Current Level of Function: Pain with walking     OBJECTIVE     Objective Measures updated at progress report unless specified.     Treatment     Kat received the treatments listed below:      therapeutic exercises to develop strength, endurance, and ROM for 28 minutes including:  Bike 5 minutes  SB Gastroc - 4 x 20 seconds  SB Soleus - 4 x 20 seconds  HAMSTRING STRETCH stretch - 4 x 20 seconds  PROSTRETCH in standing 3 x 30 second hold RLE    Ankle 4 way - Active Range of Motion / Resisted   Dorsiflexion   Plantarflexion   Inversion   Eversion   Circles      Hydraulic Ankle 4 way 3 x 10     Achilles Stretch on Stairs - 4 x 15 seconds  Heel Raises on Stairs - 4 x 10      Prostretch in Standing 4 x 15 seconds      Single Leg Stance - Firm - 3 x 30 seconds NTV  Single Leg Stance - Foam     manual therapy techniques: Joint mobilizations and Myofacial release were applied to the: foot/ankle for 25 minutes, including:    IASTM to right posterior and plantar  calcaneous and achillis tendon using GT 3 and 6 with sweep, strum and framing strokes    neuromuscular re-education activities to improve: Balance, Coordination, and Proprioception for 0 minutes. The following activities were included:      therapeutic activities to improve functional performance for 0  minutes, including:      gait training to improve functional mobility and safety for 0  minutes, including:      Patient Education and Home Exercises     Home Exercises Provided and Patient Education Provided     Education provided:   - None    Written Home Exercises Provided: Patient instructed to cont prior HEP. Exercises were reviewed and Kat was able to demonstrate them prior to the end of the session.  Kat demonstrated good  understanding of the education provided. See EMR under Patient Instructions for exercises provided during therapy sessions    ASSESSMENT      Continuing to perform Graston followed by stretches and exercises as listed above. She is continuing to have pain in the heel upon first waking up in the morning and following walk long distances at work. We have established a Home Exercise Program at this time. Patient will most likely be discharged from Therapy at end of next week.                   Right  Lower Extremity   At evaluation 10/12 ROM/Strength Right lower Extremity  11/11                                                                                                                                                                                                                 AROM PROM STRENGTH   AROM PROM STRENGTH                                                          10  12 5/5 ANKLE  Dorsiflexion     (20)       11 12 3+/5   30  35                  Plantarflexion  (50) 40 45     25  30                  Inversion         (35)     28 30     16                    Eversion          (25) 28                                                                   P.M.H:  Kat is a 62 y.o.  female referred to outpatient Physical Therapy with a medical diagnosis of Carlos's deformity with spurring. Kat reports: She has had problems with Achilles tendonitis over the past 10 years, but the Right foot started hurting more severely approx 4 weeks ago. MD performed X Ray and MRI. Achilles tendon appears to be intact. Multiple spurs noted along the Calcaneus. Patient was diagnoses with Carlos's deformity and was referred to Physical Therapy. Patient presents with decreased Range of Motion and Strength in the Right Ankle. She has pain along the calcaneus and X Rays show multiple spurs. Patient has decreased step/stride length and antalgic gait on the Right. She supinates the Right foot with ambulation in order to offload the calcaneus due to pain with weight bearing. Patient will require Physical Therapy Intervention to address all deficits and work toward completion of all goals set. Therapist will refer patient back to MD upon completion of Therapy for further assessment if pain and dysfunction persist.      Kat Is progressing towards her goals.   Pt prognosis is Good.     Pt will continue to benefit from skilled outpatient physical therapy to address the deficits listed in the problem list box on initial evaluation, provide pt/family education and to maximize pt's level of independence in the home and community environment.     Pt's spiritual, cultural and educational needs considered and pt agreeable to plan of care and goals.     Anticipated barriers to physical therapy: None    Goals: Short Term Goals: 3 weeks   1. Independent with Home Exercise Program - MET   2. Increase Right Ankle Active Range of Motion to 15 Degrees for Dorsiflexion and 45 Degrees for Plantarflexion- ONGOING  3. Increase Right Ankle Active Range of Motion to 30 Degrees for Inversion and 20 Degrees for Eversion - Partially met  4. Increase Right Ankle Strength to grossly 4/5 - ONGOING  5. Patient will ambulate 500 feet with  stance approx equal from Right to Left with complaints of pain Less than or equal to 4/10. - NOT MET     Long Term Goals: 6 weeks   1. Patient will increase Right Ankle Strength to grossly 4+/5  2. Patient will increase Right Ankle Active Range of Motion to Within Normal Limits all directions   3. Patient will ambulate 1000+ feet with no complaints of pain or weakness in Right Ankle        PLAN     Updated Certification Period: 11/11/2022 to 12/10/2022  Recommended Treatment Plan: 2 times per week for 4 weeks: Electrical Stimulation to decrease pain and inflammation as needed, Gait Training, Manual Therapy, Moist Heat/ Ice, Neuromuscular Re-ed, Patient Education, Therapeutic Activities, Therapeutic Exercise, and Ultrasound    TRAMAINE OLIVARES, PT, DPT     11/21/2022

## 2022-12-02 ENCOUNTER — CLINICAL SUPPORT (OUTPATIENT)
Dept: REHABILITATION | Facility: HOSPITAL | Age: 62
End: 2022-12-02
Attending: FAMILY MEDICINE
Payer: COMMERCIAL

## 2022-12-02 DIAGNOSIS — M25.671 DECREASED RANGE OF MOTION OF RIGHT ANKLE: ICD-10-CM

## 2022-12-02 DIAGNOSIS — R29.898 WEAKNESS OF RIGHT LEG: ICD-10-CM

## 2022-12-02 DIAGNOSIS — M92.61 HAGLUND'S DEFORMITY OF RIGHT HEEL: Primary | ICD-10-CM

## 2022-12-02 DIAGNOSIS — M79.671 PAIN OF RIGHT HEEL: ICD-10-CM

## 2022-12-02 PROCEDURE — 97140 MANUAL THERAPY 1/> REGIONS: CPT

## 2022-12-02 PROCEDURE — 97110 THERAPEUTIC EXERCISES: CPT

## 2022-12-02 NOTE — PROGRESS NOTES
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  Response to previous treatment: mild soreness  Functional change: Able to tolerate working on her feet more    Pain: 2/10  Location: right ankles and feet      Prior Level of Function: Walks apprx 3-5 miles per day at work  Current Level of Function: Pain with walking     OBJECTIVE     Objective Measures updated at progress report unless specified.     Treatment     Kat received the treatments listed below:      therapeutic exercises to develop strength, endurance, and ROM for 30 minutes including:  Bike 5 minutes  SB Gastroc - 4 x 20 seconds  SB Soleus - 4 x 20 seconds  HAMSTRING STRETCH stretch - 4 x 20 seconds  PROSTRETCH in standing 3 x 30 second hold RLE    Ankle 4 way - Active Range of Motion / Resisted   Dorsiflexion   Plantarflexion   Inversion   Eversion   Circles      Hydraulic Ankle 4 way 3 x 10     Achilles Stretch on Stairs - 4 x 15 seconds  Heel Raises on Stairs - 4 x 10      Prostretch in Standing 4 x 15 seconds      Single Leg Stance - Firm - 3 x 30 seconds   Single Leg Stance - Foam 3 trials    manual therapy techniques: Joint mobilizations and Myofacial release were applied to the: foot/ankle for 24 minutes, including:    Graston to right posterior and plantar calcaneous and  achillis tendon using GT 3 and 6 with sweep, strum and framing strokes    neuromuscular re-education activities to improve: Balance, Coordination, and Proprioception for 0 minutes. The following activities were included:      therapeutic activities to improve functional performance for 0  minutes, including:      gait training to improve functional mobility and safety for 0  minutes, including:      Patient Education and Home Exercises     Home Exercises Provided and Patient Education Provided     Education provided:   - None    Written Home Exercises Provided: Patient instructed to cont prior HEP. Exercises were reviewed and Kat was able to demonstrate them prior to the end of the session.  Kat demonstrated good  understanding of the education provided. See EMR under Patient Instructions for exercises provided during therapy sessions    ASSESSMENT      Patient has responded well to treatment with significant reduction in pain and most goals met. Patient will continue with HOME EXERCISE PROGRAM. Patient discharged today.         Right  Lower Extremity   At evaluation 10/12 ROM/Strength Right lower Extremity  12/2/2022                                                                                                                                                                                                                 AROM PROM STRENGTH   AROM PROM STRENGTH                                                          10  12 5/5 ANKLE  Dorsiflexion     (20)       12 20 5/5   30  35                  Plantarflexion  (50) 45 50 5/5   25  30                  Inversion         (35)     30 35 5/5   16                    Eversion          (25) 30 30 5/5                                                               P.M.H:  Kat is a 62 y.o. female referred to outpatient Physical Therapy with a medical diagnosis of Carlos's deformity with spurring. Kat reports: She has had problems with Achilles tendonitis over the  past 10 years, but the Right foot started hurting more severely approx 4 weeks ago. MD performed X Ray and MRI. Achilles tendon appears to be intact. Multiple spurs noted along the Calcaneus. Patient was diagnoses with Carlos's deformity and was referred to Physical Therapy. Patient presents with decreased Range of Motion and Strength in the Right Ankle. She has pain along the calcaneus and X Rays show multiple spurs. Patient has decreased step/stride length and antalgic gait on the Right. She supinates the Right foot with ambulation in order to offload the calcaneus due to pain with weight bearing. Patient will require Physical Therapy Intervention to address all deficits and work toward completion of all goals set. Therapist will refer patient back to MD upon completion of Therapy for further assessment if pain and dysfunction persist.      Kat Is progressing towards her goals.   Pt prognosis is Good.     Pt will continue to benefit from skilled outpatient physical therapy to address the deficits listed in the problem list box on initial evaluation, provide pt/family education and to maximize pt's level of independence in the home and community environment.     Pt's spiritual, cultural and educational needs considered and pt agreeable to plan of care and goals.     Anticipated barriers to physical therapy: None    Goals: Short Term Goals: 3 weeks   1. Independent with Home Exercise Program - MET   2. Increase Right Ankle Active Range of Motion to 15 Degrees for Dorsiflexion and 45 Degrees for Plantarflexion- Partially met. Lacks 3 degrees active DF  3. Increase Right Ankle Active Range of Motion to 30 Degrees for Inversion and 20 Degrees for Eversion - Met  4. Increase Right Ankle Strength to grossly 4/5 - Met  5. Patient will ambulate 500 feet with stance approx equal from Right to Left with complaints of pain Less than or equal to 4/10. - MET     Long Term Goals: 6 weeks   1. Patient will increase Right Ankle  Strength to grossly 4+/5: Met  2. Patient will increase Right Ankle Active Range of Motion to Within Normal Limits all directions: Partially met but only lacks 3 degrees of DF  3. Patient will ambulate 1000+ feet with no complaints of pain or weakness in Right Ankle: Not met, continues to have 2/10 right heel pain       PLAN     Updated Certification Period: 11/11/2022 to 12/10/2022  Recommended Treatment Plan: 2 times per week for 4 weeks: Electrical Stimulation to decrease pain and inflammation as needed, Gait Training, Manual Therapy, Moist Heat/ Ice, Neuromuscular Re-ed, Patient Education, Therapeutic Activities, Therapeutic Exercise, and Ultrasound    SHARON OLIVARES, PT, MLT    12/02/2022

## 2022-12-02 NOTE — PLAN OF CARE
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  Response to previous treatment: mild soreness  Functional change: Able to tolerate working on her feet more    Pain: 2/10  Location: right ankles and feet      Prior Level of Function: Walks apprx 3-5 miles per day at work  Current Level of Function: Pain with walking     OBJECTIVE     Objective Measures updated at progress report unless specified.     Treatment     Kta received the treatments listed below:      therapeutic exercises to develop strength, endurance, and ROM for 30 minutes including:  Bike 5 minutes  SB Gastroc - 4 x 20 seconds  SB Soleus - 4 x 20 seconds  HAMSTRING STRETCH stretch - 4 x 20 seconds  PROSTRETCH in standing 3 x 30 second hold RLE    Ankle 4 way - Active Range of Motion / Resisted   Dorsiflexion   Plantarflexion   Inversion   Eversion   Circles      Hydraulic Ankle 4 way 3 x 10     Achilles Stretch on Stairs - 4 x 15 seconds  Heel Raises on Stairs - 4 x 10      Prostretch in Standing 4 x 15 seconds      Single Leg Stance - Firm - 3 x 30 seconds   Single Leg Stance - Foam 3 trials    manual therapy techniques: Joint mobilizations and Myofacial release were applied to the: foot/ankle for 24 minutes, including:    Graston to right posterior and plantar calcaneous  and achillis tendon using GT 3 and 6 with sweep, strum and framing strokes    neuromuscular re-education activities to improve: Balance, Coordination, and Proprioception for 0 minutes. The following activities were included:      therapeutic activities to improve functional performance for 0  minutes, including:      gait training to improve functional mobility and safety for 0  minutes, including:      Patient Education and Home Exercises     Home Exercises Provided and Patient Education Provided     Education provided:   - None    Written Home Exercises Provided: Patient instructed to cont prior HEP. Exercises were reviewed and Kat was able to demonstrate them prior to the end of the session.  Kat demonstrated good  understanding of the education provided. See EMR under Patient Instructions for exercises provided during therapy sessions    ASSESSMENT      Patient has responded well to treatment with significant reduction in pain and most goals met. Patient will continue with HOME EXERCISE PROGRAM. Patient discharged today.         Right  Lower Extremity   At evaluation 10/12 ROM/Strength Right lower Extremity  12/2/2022                                                                                                                                                                                                                 AROM PROM STRENGTH   AROM PROM STRENGTH                                                          10  12 5/5 ANKLE  Dorsiflexion     (20)       12 20 5/5   30  35                  Plantarflexion  (50) 45 50 5/5   25  30                  Inversion         (35)     30 35 5/5   16                    Eversion          (25) 30 30 5/5                                                               P.M.H:  Kat is a 62 y.o. female referred to outpatient Physical Therapy with a medical diagnosis of Carlos's deformity with spurring. Kat reports: She has had problems with Achilles tendonitis over  the past 10 years, but the Right foot started hurting more severely approx 4 weeks ago. MD performed X Ray and MRI. Achilles tendon appears to be intact. Multiple spurs noted along the Calcaneus. Patient was diagnoses with Carlos's deformity and was referred to Physical Therapy. Patient presents with decreased Range of Motion and Strength in the Right Ankle. She has pain along the calcaneus and X Rays show multiple spurs. Patient has decreased step/stride length and antalgic gait on the Right. She supinates the Right foot with ambulation in order to offload the calcaneus due to pain with weight bearing. Patient will require Physical Therapy Intervention to address all deficits and work toward completion of all goals set. Therapist will refer patient back to MD upon completion of Therapy for further assessment if pain and dysfunction persist.      Kat Is progressing towards her goals.   Pt prognosis is Good.     Pt will continue to benefit from skilled outpatient physical therapy to address the deficits listed in the problem list box on initial evaluation, provide pt/family education and to maximize pt's level of independence in the home and community environment.     Pt's spiritual, cultural and educational needs considered and pt agreeable to plan of care and goals.     Anticipated barriers to physical therapy: None    Goals: Short Term Goals: 3 weeks   1. Independent with Home Exercise Program - MET   2. Increase Right Ankle Active Range of Motion to 15 Degrees for Dorsiflexion and 45 Degrees for Plantarflexion- Partially met. Lacks 3 degrees active DF  3. Increase Right Ankle Active Range of Motion to 30 Degrees for Inversion and 20 Degrees for Eversion - Met  4. Increase Right Ankle Strength to grossly 4/5 - Met  5. Patient will ambulate 500 feet with stance approx equal from Right to Left with complaints of pain Less than or equal to 4/10. - MET     Long Term Goals: 6 weeks   1. Patient will increase Right  Ankle Strength to grossly 4+/5: Met  2. Patient will increase Right Ankle Active Range of Motion to Within Normal Limits all directions: Partially met but only lacks 3 degrees of DF  3. Patient will ambulate 1000+ feet with no complaints of pain or weakness in Right Ankle: Not met, continues to have 2/10 right heel pain     Discharge reason: Other:  Patient has met most goals and due to holidays coming up she would like to continue with HOME EXERCISE PROGRAM and try to come back later.    Plan   This patient is discharged from Physical Therapy.    Date of Last visit: 12/2/2022  Total Visits Received: 11  Cancelled Visits: 1  No Show Visits: 0    SHARON OLIVARES, PT, MLT

## 2023-02-07 ENCOUNTER — HOSPITAL ENCOUNTER (INPATIENT)
Facility: HOSPITAL | Age: 63
LOS: 1 days | Discharge: HOME | DRG: 309 | End: 2023-02-11
Attending: EMERGENCY MEDICINE | Admitting: HOSPITALIST
Payer: COMMERCIAL

## 2023-02-07 DIAGNOSIS — M76.61 ACHILLES TENDINITIS OF RIGHT LOWER EXTREMITY: ICD-10-CM

## 2023-02-07 DIAGNOSIS — I48.91 A-FIB: ICD-10-CM

## 2023-02-07 DIAGNOSIS — M76.71 PERONEAL TENDINITIS OF RIGHT LOWER EXTREMITY: ICD-10-CM

## 2023-02-07 DIAGNOSIS — I48.92 ATRIAL FLUTTER, UNSPECIFIED TYPE: ICD-10-CM

## 2023-02-07 DIAGNOSIS — R07.9 CHEST PAIN: ICD-10-CM

## 2023-02-07 DIAGNOSIS — R00.2 PALPITATIONS: ICD-10-CM

## 2023-02-07 DIAGNOSIS — I48.92 ATRIAL FLUTTER: ICD-10-CM

## 2023-02-07 DIAGNOSIS — I48.0 PAROXYSMAL ATRIAL FIBRILLATION: ICD-10-CM

## 2023-02-07 DIAGNOSIS — I10 HYPERTENSION, UNSPECIFIED TYPE: ICD-10-CM

## 2023-02-07 DIAGNOSIS — E66.01 MORBID OBESITY WITH BMI OF 45.0-49.9, ADULT: ICD-10-CM

## 2023-02-07 DIAGNOSIS — J45.40 MODERATE PERSISTENT ASTHMA WITHOUT COMPLICATION: ICD-10-CM

## 2023-02-07 DIAGNOSIS — N17.9 AKI (ACUTE KIDNEY INJURY): ICD-10-CM

## 2023-02-07 DIAGNOSIS — R00.0 TACHYARRHYTHMIA: ICD-10-CM

## 2023-02-07 DIAGNOSIS — I48.91 ATRIAL FIBRILLATION WITH RVR: Primary | ICD-10-CM

## 2023-02-07 DIAGNOSIS — I49.1 PAC (PREMATURE ATRIAL CONTRACTION): ICD-10-CM

## 2023-02-07 DIAGNOSIS — K21.9 GASTROESOPHAGEAL REFLUX DISEASE, UNSPECIFIED WHETHER ESOPHAGITIS PRESENT: ICD-10-CM

## 2023-02-07 DIAGNOSIS — G47.33 OSA (OBSTRUCTIVE SLEEP APNEA): ICD-10-CM

## 2023-02-07 DIAGNOSIS — I87.1 MAY-THURNER SYNDROME: ICD-10-CM

## 2023-02-07 DIAGNOSIS — I48.11 LONGSTANDING PERSISTENT ATRIAL FIBRILLATION: ICD-10-CM

## 2023-02-07 LAB
ALBUMIN SERPL BCP-MCNC: 4.6 G/DL (ref 3.5–5)
ALBUMIN/GLOB SERPL: 1.4 {RATIO}
ALP SERPL-CCNC: 98 U/L (ref 50–130)
ALT SERPL W P-5'-P-CCNC: 21 U/L (ref 13–56)
ANION GAP SERPL CALCULATED.3IONS-SCNC: 18 MMOL/L (ref 7–16)
APTT PPP: 34.4 SECONDS (ref 25.2–37.3)
AST SERPL W P-5'-P-CCNC: 18 U/L (ref 15–37)
BASOPHILS # BLD AUTO: 0.08 K/UL (ref 0–0.2)
BASOPHILS NFR BLD AUTO: 0.8 % (ref 0–1)
BILIRUB SERPL-MCNC: 0.7 MG/DL (ref ?–1.2)
BUN SERPL-MCNC: 21 MG/DL (ref 7–18)
BUN/CREAT SERPL: 14 (ref 6–20)
CALCIUM SERPL-MCNC: 9.6 MG/DL (ref 8.5–10.1)
CHLORIDE SERPL-SCNC: 100 MMOL/L (ref 98–107)
CO2 SERPL-SCNC: 22 MMOL/L (ref 21–32)
CREAT SERPL-MCNC: 1.5 MG/DL (ref 0.55–1.02)
D DIMER PPP FEU-MCNC: 0.51 ΜG/ML (ref 0–0.47)
DIFFERENTIAL METHOD BLD: ABNORMAL
EGFR (NO RACE VARIABLE) (RUSH/TITUS): 39 ML/MIN/1.73M²
EOSINOPHIL # BLD AUTO: 0.46 K/UL (ref 0–0.5)
EOSINOPHIL NFR BLD AUTO: 4.4 % (ref 1–4)
ERYTHROCYTE [DISTWIDTH] IN BLOOD BY AUTOMATED COUNT: 13.9 % (ref 11.5–14.5)
FLUAV AG UPPER RESP QL IA.RAPID: NEGATIVE
FLUBV AG UPPER RESP QL IA.RAPID: NEGATIVE
GLOBULIN SER-MCNC: 3.3 G/DL (ref 2–4)
GLUCOSE SERPL-MCNC: 124 MG/DL (ref 74–106)
HCT VFR BLD AUTO: 44.3 % (ref 38–47)
HGB BLD-MCNC: 13.9 G/DL (ref 12–16)
IMM GRANULOCYTES # BLD AUTO: 0.02 K/UL (ref 0–0.04)
IMM GRANULOCYTES NFR BLD: 0.2 % (ref 0–0.4)
INR BLD: 1.07
LYMPHOCYTES # BLD AUTO: 2.15 K/UL (ref 1–4.8)
LYMPHOCYTES NFR BLD AUTO: 20.3 % (ref 27–41)
MAGNESIUM SERPL-MCNC: 2.3 MG/DL (ref 1.7–2.3)
MCH RBC QN AUTO: 29.1 PG (ref 27–31)
MCHC RBC AUTO-ENTMCNC: 31.4 G/DL (ref 32–36)
MCV RBC AUTO: 92.7 FL (ref 80–96)
MONOCYTES # BLD AUTO: 0.51 K/UL (ref 0–0.8)
MONOCYTES NFR BLD AUTO: 4.8 % (ref 2–6)
MPC BLD CALC-MCNC: 12 FL (ref 9.4–12.4)
NEUTROPHILS # BLD AUTO: 7.35 K/UL (ref 1.8–7.7)
NEUTROPHILS NFR BLD AUTO: 69.5 % (ref 53–65)
NRBC # BLD AUTO: 0 X10E3/UL
NRBC, AUTO (.00): 0 %
NT-PROBNP SERPL-MCNC: 1447 PG/ML (ref 1–125)
PLATELET # BLD AUTO: 414 K/UL (ref 150–400)
POTASSIUM SERPL-SCNC: 5.2 MMOL/L (ref 3.5–5.1)
PROT SERPL-MCNC: 7.9 G/DL (ref 6.4–8.2)
PROTHROMBIN TIME: 13.5 SECONDS (ref 11.7–14.7)
RBC # BLD AUTO: 4.78 M/UL (ref 4.2–5.4)
SARS-COV+SARS-COV-2 AG RESP QL IA.RAPID: NEGATIVE
SODIUM SERPL-SCNC: 135 MMOL/L (ref 136–145)
TROPONIN I SERPL HS-MCNC: 33.1 PG/ML
TROPONIN I SERPL HS-MCNC: 41.6 PG/ML
TROPONIN I SERPL HS-MCNC: 47 PG/ML
TSH SERPL DL<=0.005 MIU/L-ACNC: 1.81 UIU/ML (ref 0.36–3.74)
WBC # BLD AUTO: 10.57 K/UL (ref 4.5–11)

## 2023-02-07 PROCEDURE — 99214 OFFICE O/P EST MOD 30 MIN: CPT | Mod: ,,, | Performed by: INTERNAL MEDICINE

## 2023-02-07 PROCEDURE — 25000003 PHARM REV CODE 250

## 2023-02-07 PROCEDURE — 25000003 PHARM REV CODE 250: Performed by: HOSPITALIST

## 2023-02-07 PROCEDURE — 99285 PR EMERGENCY DEPT VISIT,LEVEL V: ICD-10-PCS | Mod: ,,, | Performed by: EMERGENCY MEDICINE

## 2023-02-07 PROCEDURE — 99285 EMERGENCY DEPT VISIT HI MDM: CPT | Mod: ,,, | Performed by: EMERGENCY MEDICINE

## 2023-02-07 PROCEDURE — 83880 ASSAY OF NATRIURETIC PEPTIDE: CPT | Performed by: EMERGENCY MEDICINE

## 2023-02-07 PROCEDURE — 63600175 PHARM REV CODE 636 W HCPCS

## 2023-02-07 PROCEDURE — 96375 TX/PRO/DX INJ NEW DRUG ADDON: CPT

## 2023-02-07 PROCEDURE — 85379 FIBRIN DEGRADATION QUANT: CPT | Performed by: HOSPITALIST

## 2023-02-07 PROCEDURE — 93005 ELECTROCARDIOGRAM TRACING: CPT

## 2023-02-07 PROCEDURE — 99223 1ST HOSP IP/OBS HIGH 75: CPT | Mod: $0,,, | Performed by: HOSPITALIST

## 2023-02-07 PROCEDURE — 85730 THROMBOPLASTIN TIME PARTIAL: CPT | Performed by: HOSPITALIST

## 2023-02-07 PROCEDURE — 85025 COMPLETE CBC W/AUTO DIFF WBC: CPT | Performed by: EMERGENCY MEDICINE

## 2023-02-07 PROCEDURE — G0378 HOSPITAL OBSERVATION PER HR: HCPCS

## 2023-02-07 PROCEDURE — 99223 PR INITIAL HOSPITAL CARE,LEVL III: ICD-10-PCS | Mod: $0,,, | Performed by: HOSPITALIST

## 2023-02-07 PROCEDURE — 80053 COMPREHEN METABOLIC PANEL: CPT | Performed by: EMERGENCY MEDICINE

## 2023-02-07 PROCEDURE — 93010 ELECTROCARDIOGRAM REPORT: CPT | Mod: ,,, | Performed by: INTERNAL MEDICINE

## 2023-02-07 PROCEDURE — 83735 ASSAY OF MAGNESIUM: CPT | Performed by: EMERGENCY MEDICINE

## 2023-02-07 PROCEDURE — 93010 ELECTROCARDIOGRAM REPORT: CPT | Mod: 76,,, | Performed by: INTERNAL MEDICINE

## 2023-02-07 PROCEDURE — 96376 TX/PRO/DX INJ SAME DRUG ADON: CPT

## 2023-02-07 PROCEDURE — 63600175 PHARM REV CODE 636 W HCPCS: Performed by: EMERGENCY MEDICINE

## 2023-02-07 PROCEDURE — 87428 SARSCOV & INF VIR A&B AG IA: CPT | Performed by: HOSPITALIST

## 2023-02-07 PROCEDURE — 63600175 PHARM REV CODE 636 W HCPCS: Performed by: REGISTERED NURSE

## 2023-02-07 PROCEDURE — 96361 HYDRATE IV INFUSION ADD-ON: CPT

## 2023-02-07 PROCEDURE — 84443 ASSAY THYROID STIM HORMONE: CPT | Performed by: EMERGENCY MEDICINE

## 2023-02-07 PROCEDURE — 93010 EKG 12-LEAD: ICD-10-PCS | Mod: ,,, | Performed by: INTERNAL MEDICINE

## 2023-02-07 PROCEDURE — 25000003 PHARM REV CODE 250: Performed by: EMERGENCY MEDICINE

## 2023-02-07 PROCEDURE — 99285 EMERGENCY DEPT VISIT HI MDM: CPT | Mod: 25

## 2023-02-07 PROCEDURE — 84484 ASSAY OF TROPONIN QUANT: CPT | Mod: 91 | Performed by: HOSPITALIST

## 2023-02-07 PROCEDURE — 25500020 PHARM REV CODE 255: Performed by: HOSPITALIST

## 2023-02-07 PROCEDURE — 84484 ASSAY OF TROPONIN QUANT: CPT | Performed by: EMERGENCY MEDICINE

## 2023-02-07 PROCEDURE — 99214 PR OFFICE/OUTPT VISIT, EST, LEVL IV, 30-39 MIN: ICD-10-PCS | Mod: ,,, | Performed by: INTERNAL MEDICINE

## 2023-02-07 PROCEDURE — 96365 THER/PROPH/DIAG IV INF INIT: CPT

## 2023-02-07 PROCEDURE — 96372 THER/PROPH/DIAG INJ SC/IM: CPT | Performed by: REGISTERED NURSE

## 2023-02-07 PROCEDURE — 96366 THER/PROPH/DIAG IV INF ADDON: CPT

## 2023-02-07 PROCEDURE — 63600175 PHARM REV CODE 636 W HCPCS: Performed by: HOSPITALIST

## 2023-02-07 RX ORDER — NALOXONE HCL 0.4 MG/ML
0.02 VIAL (ML) INJECTION
Status: DISCONTINUED | OUTPATIENT
Start: 2023-02-07 | End: 2023-02-11 | Stop reason: HOSPADM

## 2023-02-07 RX ORDER — METOPROLOL TARTRATE 1 MG/ML
5 INJECTION, SOLUTION INTRAVENOUS
Status: COMPLETED | OUTPATIENT
Start: 2023-02-07 | End: 2023-02-07

## 2023-02-07 RX ORDER — GUAIFENESIN/DEXTROMETHORPHAN 100-10MG/5
10 SYRUP ORAL EVERY 6 HOURS PRN
Status: DISCONTINUED | OUTPATIENT
Start: 2023-02-08 | End: 2023-02-11 | Stop reason: HOSPADM

## 2023-02-07 RX ORDER — LANOLIN ALCOHOL/MO/W.PET/CERES
800 CREAM (GRAM) TOPICAL
Status: DISCONTINUED | OUTPATIENT
Start: 2023-02-07 | End: 2023-02-11 | Stop reason: HOSPADM

## 2023-02-07 RX ORDER — SODIUM,POTASSIUM PHOSPHATES 280-250MG
2 POWDER IN PACKET (EA) ORAL
Status: DISCONTINUED | OUTPATIENT
Start: 2023-02-07 | End: 2023-02-11 | Stop reason: HOSPADM

## 2023-02-07 RX ORDER — DIGOXIN 250 MCG
0.25 TABLET ORAL DAILY
Status: DISCONTINUED | OUTPATIENT
Start: 2023-02-08 | End: 2023-02-07

## 2023-02-07 RX ORDER — ACETAMINOPHEN 325 MG/1
650 TABLET ORAL EVERY 4 HOURS PRN
Status: DISCONTINUED | OUTPATIENT
Start: 2023-02-07 | End: 2023-02-07

## 2023-02-07 RX ORDER — DIGOXIN 0.25 MG/ML
500 INJECTION INTRAMUSCULAR; INTRAVENOUS
Status: COMPLETED | OUTPATIENT
Start: 2023-02-07 | End: 2023-02-07

## 2023-02-07 RX ORDER — ACETAMINOPHEN 500 MG
1000 TABLET ORAL EVERY 8 HOURS PRN
Status: DISCONTINUED | OUTPATIENT
Start: 2023-02-07 | End: 2023-02-07

## 2023-02-07 RX ORDER — SODIUM CHLORIDE, SODIUM LACTATE, POTASSIUM CHLORIDE, CALCIUM CHLORIDE 600; 310; 30; 20 MG/100ML; MG/100ML; MG/100ML; MG/100ML
INJECTION, SOLUTION INTRAVENOUS CONTINUOUS
Status: DISCONTINUED | OUTPATIENT
Start: 2023-02-07 | End: 2023-02-09

## 2023-02-07 RX ORDER — ACETAMINOPHEN 500 MG
1000 TABLET ORAL EVERY 6 HOURS PRN
Status: DISCONTINUED | OUTPATIENT
Start: 2023-02-08 | End: 2023-02-11 | Stop reason: HOSPADM

## 2023-02-07 RX ORDER — TRAZODONE HYDROCHLORIDE 50 MG/1
50 TABLET ORAL NIGHTLY PRN
Status: DISCONTINUED | OUTPATIENT
Start: 2023-02-08 | End: 2023-02-11 | Stop reason: HOSPADM

## 2023-02-07 RX ORDER — BISACODYL 5 MG
10 TABLET, DELAYED RELEASE (ENTERIC COATED) ORAL DAILY PRN
Status: DISCONTINUED | OUTPATIENT
Start: 2023-02-08 | End: 2023-02-11 | Stop reason: HOSPADM

## 2023-02-07 RX ORDER — GLUCAGON 1 MG
1 KIT INJECTION
Status: DISCONTINUED | OUTPATIENT
Start: 2023-02-07 | End: 2023-02-11 | Stop reason: HOSPADM

## 2023-02-07 RX ORDER — HYDRALAZINE HYDROCHLORIDE 20 MG/ML
10 INJECTION INTRAMUSCULAR; INTRAVENOUS EVERY 6 HOURS PRN
Status: DISCONTINUED | OUTPATIENT
Start: 2023-02-07 | End: 2023-02-11 | Stop reason: HOSPADM

## 2023-02-07 RX ORDER — TALC
6 POWDER (GRAM) TOPICAL NIGHTLY PRN
Status: DISCONTINUED | OUTPATIENT
Start: 2023-02-07 | End: 2023-02-11 | Stop reason: HOSPADM

## 2023-02-07 RX ORDER — MAG HYDROX/ALUMINUM HYD/SIMETH 200-200-20
30 SUSPENSION, ORAL (FINAL DOSE FORM) ORAL 4 TIMES DAILY PRN
Status: DISCONTINUED | OUTPATIENT
Start: 2023-02-07 | End: 2023-02-11 | Stop reason: HOSPADM

## 2023-02-07 RX ORDER — ADENOSINE 3 MG/ML
6 INJECTION, SOLUTION INTRAVENOUS
Status: COMPLETED | OUTPATIENT
Start: 2023-02-07 | End: 2023-02-07

## 2023-02-07 RX ORDER — ADENOSINE 3 MG/ML
12 INJECTION, SOLUTION INTRAVENOUS
Status: COMPLETED | OUTPATIENT
Start: 2023-02-07 | End: 2023-02-07

## 2023-02-07 RX ORDER — ENOXAPARIN SODIUM 100 MG/ML
1 INJECTION SUBCUTANEOUS
Status: DISCONTINUED | OUTPATIENT
Start: 2023-02-07 | End: 2023-02-09

## 2023-02-07 RX ORDER — POLYETHYLENE GLYCOL 3350 17 G/17G
17 POWDER, FOR SOLUTION ORAL DAILY PRN
Status: DISCONTINUED | OUTPATIENT
Start: 2023-02-07 | End: 2023-02-11 | Stop reason: HOSPADM

## 2023-02-07 RX ORDER — SODIUM CHLORIDE 9 MG/ML
INJECTION, SOLUTION INTRAVENOUS
Status: COMPLETED
Start: 2023-02-07 | End: 2023-02-07

## 2023-02-07 RX ORDER — ACETAMINOPHEN 325 MG/1
650 TABLET ORAL EVERY 8 HOURS PRN
Status: DISCONTINUED | OUTPATIENT
Start: 2023-02-07 | End: 2023-02-07

## 2023-02-07 RX ORDER — SODIUM CHLORIDE 0.9 % (FLUSH) 0.9 %
10 SYRINGE (ML) INJECTION
Status: DISCONTINUED | OUTPATIENT
Start: 2023-02-07 | End: 2023-02-11 | Stop reason: HOSPADM

## 2023-02-07 RX ORDER — ONDANSETRON 2 MG/ML
4 INJECTION INTRAMUSCULAR; INTRAVENOUS EVERY 8 HOURS PRN
Status: DISCONTINUED | OUTPATIENT
Start: 2023-02-07 | End: 2023-02-11 | Stop reason: HOSPADM

## 2023-02-07 RX ORDER — HYDROMORPHONE HYDROCHLORIDE 2 MG/ML
1 INJECTION, SOLUTION INTRAMUSCULAR; INTRAVENOUS; SUBCUTANEOUS
Status: DISCONTINUED | OUTPATIENT
Start: 2023-02-07 | End: 2023-02-07

## 2023-02-07 RX ORDER — ADENOSINE 3 MG/ML
INJECTION, SOLUTION INTRAVENOUS
Status: COMPLETED
Start: 2023-02-07 | End: 2023-02-07

## 2023-02-07 RX ADMIN — METOPROLOL TARTRATE 5 MG: 1 INJECTION, SOLUTION INTRAVENOUS at 05:02

## 2023-02-07 RX ADMIN — ADENOSINE 12 MG: 3 INJECTION INTRAVENOUS at 05:02

## 2023-02-07 RX ADMIN — SODIUM CHLORIDE 1000 ML: 9 INJECTION, SOLUTION INTRAVENOUS at 05:02

## 2023-02-07 RX ADMIN — ADENOSINE 6 MG: 3 INJECTION INTRAVENOUS at 05:02

## 2023-02-07 RX ADMIN — DILTIAZEM HYDROCHLORIDE 10 MG/HR: 5 INJECTION INTRAVENOUS at 05:02

## 2023-02-07 RX ADMIN — ENOXAPARIN SODIUM 140 MG: 100 INJECTION SUBCUTANEOUS at 05:02

## 2023-02-07 RX ADMIN — ADENOSINE 6 MG: 3 INJECTION, SOLUTION INTRAVENOUS at 05:02

## 2023-02-07 RX ADMIN — PERFLUTREN 1.5 ML: 6.52 INJECTION, SUSPENSION INTRAVENOUS at 11:02

## 2023-02-07 RX ADMIN — DIGOXIN 500 MCG: 0.25 INJECTION INTRAMUSCULAR; INTRAVENOUS at 08:02

## 2023-02-07 RX ADMIN — DILTIAZEM HYDROCHLORIDE 10 MG/HR: 5 INJECTION INTRAVENOUS at 11:02

## 2023-02-07 RX ADMIN — DILTIAZEM HYDROCHLORIDE 10 MG/HR: 5 INJECTION INTRAVENOUS at 01:02

## 2023-02-07 RX ADMIN — SODIUM CHLORIDE, POTASSIUM CHLORIDE, SODIUM LACTATE AND CALCIUM CHLORIDE: 600; 310; 30; 20 INJECTION, SOLUTION INTRAVENOUS at 05:02

## 2023-02-07 RX ADMIN — MELATONIN 6 MG: at 06:02

## 2023-02-07 RX ADMIN — METOPROLOL TARTRATE 5 MG: 1 INJECTION, SOLUTION INTRAVENOUS at 08:02

## 2023-02-07 NOTE — Clinical Note
Plans to return to the floor in 15 min relayed to JORGE Urban RN Spoke with Jasper and notified of appt with Dr. Wiley on 1/9 at 1PM.  Jasper verbalized understanding.  Notified Jasper that once she sees Dr. Santa and has a date set for port to be placed that we will schedule her back to see Dr. Pop.  Jasper verbalized understanding and stated he will call with any additional questions or concerns.

## 2023-02-07 NOTE — ED PROVIDER NOTES
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  No associated chest pain.  Mild shortness of breath.  No nausea or vomiting.  The symptoms happened abruptly about 20 minutes prior to arrival.    Review of patient's allergies indicates:   Allergen Reactions    Poppy Swelling    Aldomet amanda hcl injection      Headache     Past Medical History:   Diagnosis Date    Asthma     Bilateral leg pain     Chronic low back pain     Compression of vein     Iliac vein compression syndrome    Digestive disorder     Edema of lower extremity     Gastroesophageal reflux disease     History of basal cell carcinoma (BCC) of skin 2022    Hx of phlebitis     Hx of thrombophlebitis     Hyperlipidemia     Hypertension     Lymphedema, not elsewhere classified     Morbid obesity     Other skin changes     Peripheral edema     Peripheral vascular disease, unspecified     Postartificial menopausal syndrome     Premature atrial contraction     Sleep apnea      Past Surgical History:   Procedure Laterality Date    ARTHROSCOPY OF KNEE Left 2021    Procedure: ARTHROSCOPY, KNEE;  Surgeon: Noman Huerta MD;  Location: [facility];  Service: Orthopedics;  Laterality: Left;    BASAL CELL CARCINOMA EXCISION  2022    left eyebrow (Mohs w/ Dr. Garcia)     SECTION      CHOLECYSTECTOMY      EXCISION OF MEDIAL MENISCUS OF KNEE Left 2021    Procedure: MENISCECTOMY, KNEE, MEDIAL;  Surgeon: Noman Huerta MD;  Location: [facility];  Service: Orthopedics;  Laterality: Left;    HYSTERECTOMY      KNEE ARTHROSCOPY W/ MENISCECTOMY Left 2021    Procedure: ARTHROSCOPY, KNEE, WITH MENISCECTOMY;  Surgeon: Noman Huerta MD;  Location: [facility];  Service: Orthopedics;  Laterality: Left;  LATERAL  MENISECTOMY    SPINE SURGERY  1998    Lumbar Laminectomy, L4-L5     Family History   Problem Relation Age of Onset    Diabetes Mellitus Mother     Heart disease Father     Hypertension Father     Diabetes Mellitus Sister     Diabetes Mellitus Brother     Heart disease Brother      Social History     Tobacco Use    Smoking status: Never    Smokeless tobacco: Never   Substance Use Topics    Alcohol use: Never    Drug use: Never     Review of Systems   Constitutional:  Negative for fever.   HENT:  Negative for sore throat.    Respiratory:  Positive for shortness of breath.    Cardiovascular:  Positive for palpitations. Negative for chest pain.   Gastrointestinal:  Negative for nausea.   Genitourinary:  Negative for dysuria.   Musculoskeletal:  Negative for back pain.   Skin:  Negative for rash.   Neurological:  Negative for weakness.   Hematological:  Does not bruise/bleed easily.     Physical Exam     Initial Vitals   BP Pulse Resp Temp SpO2   02/07/23 0444 02/07/23 0442 02/07/23 0442 02/07/23 0442 02/07/23 0442   (!) 238/59 (!) 245 (!) 26 97.8 °F (36.6 °C) 98 %      MAP       --                Physical Exam    Medical Screening Exam   See Full Note    ED Course   Procedures  Labs Reviewed   COMPREHENSIVE METABOLIC PANEL - Abnormal; Notable for the following components:       Result Value    Sodium 135 (*)     Potassium 5.2 (*)     Anion Gap 18 (*)     Glucose 124 (*)     BUN 21 (*)     Creatinine 1.50 (*)     eGFR 39 (*)     All other components within normal limits   NT-PRO NATRIURETIC PEPTIDE - Abnormal; Notable for the following components:    ProBNP 1,447 (*)     All other components within normal limits   CBC WITH DIFFERENTIAL - Abnormal; Notable for the following components:    MCHC 31.4 (*)     Platelet Count 414 (*)     Neutrophils % 69.5 (*)     Lymphocytes % 20.3 (*)     Eosinophils % 4.4 (*)     All other components within normal limits   D DIMER, QUANTITATIVE - Abnormal; Notable for the following  components:    D-Dimer 0.51 (*)     All other components within normal limits   TSH - Normal   TROPONIN I - Normal   MAGNESIUM - Normal   TROPONIN I - Normal   TROPONIN I - Normal   SARS-COV2 (COVID) W/ FLU ANTIGEN - Normal    Narrative:     Negative SARS-CoV results should not be used as the sole basis for treatment or patient management decisions; negative results should be considered in the context of a patient's recent exposures, history and the presene of clinical signs and symptoms consistent with COVID-19.  Negative results should be treated as presumptive and confirmed by molecular assay, if necessary for patient management.   PT AND PTT - Normal   CBC W/ AUTO DIFFERENTIAL    Narrative:     The following orders were created for panel order CBC auto differential.  Procedure                               Abnormality         Status                     ---------                               -----------         ------                     CBC with Differential[[id number]]        Abnormal            Final result                 Please view results for these tests on the individual orders.   EXTRA TUBES    Narrative:     The following orders were created for panel order EXTRA TUBES.  Procedure                               Abnormality         Status                     ---------                               -----------         ------                     Light Blue Top Hold[[id number]]                              In process                 Light Green Top Hold[[id number]]                                                        Lavender Top Hold[[id number]]                                                           Gold Top Hold[[id number]]                                                                 Please view results for these tests on the individual orders.   LIGHT BLUE TOP HOLD   URINALYSIS, REFLEX TO URINE CULTURE        ECG Results              EKG 12-lead (In process)  Result time 02/07/23 12:16:20      In  process by Interface, Lab In [facility] (02/07/23 12:16:20)                   Narrative:    Test Reason : I48.92,    Vent. Rate : 124 BPM     Atrial Rate : 000 BPM     P-R Int : 000 ms          QRS Dur : 102 ms      QT Int : 290 ms       P-R-T Axes : 000 024 134 degrees     QTc Int : 402 ms    Atrial flutter  with rapid ventricular response  with 2:1 A-V block  Widespread ST-T abnormality  may be due to myocardial ischemia  Abnormal ECG      Referred By: [email or web address]   SELF           Confirmed By:                                      EKG 12-lead (In process)  Result time 02/07/23 06:14:52      In process by Interface, Lab In [facility] (02/07/23 06:14:52)                   Narrative:    Test Reason : R00.2,    Vent. Rate : 123 BPM     Atrial Rate : 000 BPM     P-R Int : 000 ms          QRS Dur : 098 ms      QT Int : 304 ms       P-R-T Axes : 000 040 069 degrees     QTc Int : 414 ms     CONSIDER ACUTE STEMI   Atrial flutter  with rapid ventricular response  with 2:1 A-V block  Lateral ST elevation , CONSIDER ACUTE INFARCT  Inferior and anterior T wave abnormality  is nonspecific  Abnormal ECG      Referred By: [email or web address]   SELF           Confirmed By:                                      EKG 12-lead (In process)  Result time 02/07/23 06:14:42      In process by Interface, Lab In [facility] (02/07/23 06:14:42)                   Narrative:    Test Reason : R00.2,    Vent. Rate : 128 BPM     Atrial Rate : 000 BPM     P-R Int : 000 ms          QRS Dur : 092 ms      QT Int : 280 ms       P-R-T Axes : 000 041 091 degrees     QTc Int : 399 ms    Possible atrial flutter  with rapid ventricular response  Widespread T wave abnormality  is nonspecific  Abnormal ECG      Referred By: [email or web address]   SELF           Confirmed By:                                      EKG 12-lead (In process)  Result time 02/07/23 06:14:34      In process by Interface, Lab In [facility] (02/07/23 06:14:34)                   Narrative:    Test Reason : R00.2,    Vent.  Rate : 244 BPM     Atrial Rate : 000 BPM     P-R Int : 000 ms          QRS Dur : 092 ms      QT Int : 224 ms       P-R-T Axes : 000 061 253 degrees     QTc Int : 546 ms     EXTREME TACHYCARDIA   Probable atrial fibrillation  with uncontrolled ventricular response  Widespread ST-T abnormality  may be due to myocardial ischemia  Abnormal ECG      Referred By: [email or web address]   SELF           Confirmed By:                                      EKG 12-lead (In process)  Result time 02/07/23 05:54:30      In process by Interface, Lab In [facility] (02/07/23 05:54:30)                   Narrative:    Test Reason : R00.2,    Vent. Rate : 240 BPM     Atrial Rate : 000 BPM     P-R Int : 000 ms          QRS Dur : 094 ms      QT Int : 232 ms       P-R-T Axes : 000 065 243 degrees     QTc Int : 547 ms     EXTREME TACHYCARDIA   Atrial flutter  with uncontrolled ventricular response  Widespread ST-T abnormality  may be due to myocardial ischemia  Abnormal ECG      Referred By: [email or web address]   SELF           Confirmed By:                                   Imaging Results              X-Ray Chest 1 View (Final result)  Result time 02/07/23 07:50:44      Final result by Francesco Eagle II, MD (02/07/23 07:50:44)                   Impression:      No evidence of acute cardiopulmonary disease.      Electronically signed by: Francesco Eagle  Date:    02/07/2023  Time:    07:50               Narrative:    EXAMINATION:  XR CHEST 1 VIEW    CLINICAL HISTORY:  Palpitations    COMPARISON:  7 February 2020    TECHNIQUE:  XR CHEST 1 VIEW    FINDINGS:  The heart and mediastinum are normal in size and configuration.  The pulmonary vascularity is normal in caliber.  No lung infiltrates, effusions, pneumothorax or other abnormality is demonstrated.                                       Medications   diltiaZEM (CARDIZEM) 125 mg in dextrose 5 % (D5W) 125 mL infusion (10 mg/hr Intravenous New Bag 2/8/23 0090)   sodium chloride 0.9% flush 10 mL (has no  administration in time range)   melatonin tablet 6 mg (6 mg Oral Not Given 2/8/23 0012)   ondansetron injection 4 mg (has no administration in time range)   polyethylene glycol packet 17 g (has no administration in time range)   aluminum-magnesium hydroxide-simethicone 200-200-20 mg/5 mL suspension 30 mL (has no administration in time range)   naloxone 0.4 mg/mL injection 0.02 mg (has no administration in time range)   potassium bicarbonate disintegrating tablet 50 mEq (has no administration in time range)   potassium bicarbonate disintegrating tablet 35 mEq (has no administration in time range)   potassium bicarbonate disintegrating tablet 60 mEq (has no administration in time range)   magnesium oxide tablet 800 mg (has no administration in time range)   magnesium oxide tablet 800 mg (has no administration in time range)   potassium, sodium phosphates 280-160-250 mg packet 2 packet (has no administration in time range)   potassium, sodium phosphates 280-160-250 mg packet 2 packet (has no administration in time range)   potassium, sodium phosphates 280-160-250 mg packet 2 packet (has no administration in time range)   glucagon (human recombinant) injection 1 mg (has no administration in time range)   dextrose 10% bolus 125 mL 125 mL (has no administration in time range)   dextrose 10% bolus 250 mL 250 mL (has no administration in time range)   enoxaparin injection 140 mg (140 mg Subcutaneous Given 2/8/23 1800)   lactated ringers infusion ( Intravenous New Bag 2/8/23 0631)   hydrALAZINE injection 10 mg (has no administration in time range)   acetaminophen tablet 1,000 mg (has no administration in time range)   bisacodyL EC tablet 10 mg (has no administration in time range)   dextromethorphan-guaiFENesin  mg/5 ml liquid 10 mL (has no administration in time range)   traZODone tablet 50 mg (50 mg Oral Given 2/8/23 0019)   metoprolol injection 5 mg (5 mg Intravenous Given 2/7/23 0530)   sodium chloride 0.9% bolus  1,000 mL 1,000 mL (0 mLs Intravenous Stopped 2/7/23 0728)   adenosine injection 12 mg (12 mg Intravenous Given 2/7/23 0530)   adenosine injection 6 mg (6 mg Intravenous Given 2/7/23 0530)   metoprolol injection 5 mg (5 mg Intravenous Given 2/7/23 0531)   digoxin injection 500 mcg (500 mcg Intravenous Given 2/7/23 0819)   metoprolol injection 5 mg (5 mg Intravenous Given 2/7/23 0818)   perflutren lipid microspheres injection 1.5 mL (1.5 mLs Intravenous Given 2/7/23 1110)     Medical Decision Making:   ED Management:  [facility]    Patient presents for emergent evaluation of acute palpitation that poses a threat to life and/or bodily function.    In the ED patient found to have acute atrial flutter with rapid ventricular response.    I ordered labs and personally reviewed them.  Labs significant for proBNP 1447 potassium 5.2 creatinine 1.5.    I ordered X-rays and personally reviewed them and reviewed the radiologist interpretation.  Xray significant for chest x-ray no acute abnormality.    I ordered EKG and personally reviewed it.  EKG significant for atrial flutter.      Admission [facility]  I discussed the patient presentation labs, ekg the consultant for Cardiology and Hospital Medicine (speciality).    Patient was managed in the ED with IV adenosine.  IV metoprolol  The response to treatment was improved.    Patient required emergent consultation to cardiologist and hospitalist (admitting physician) for admission.            ED Course as of 02/09/23 0208 Tue Feb 07, 2023 0501 Patient presents with severe narrow complex tachycardia that was not responsive to vagal maneuvers.  Treated with 6 mg of adenosine and with 12 mg of adenosine both times patient did convert to normal sinus rhythm but only for about 10 seconds and then reverted back to the heart rate of around 245.  During the block did note atrial flutter.  Case discussed with cardiologist Dr. Wyman will plan on using IV metoprolol up to 3 doses to assess response  and then will consider cardioversion if that is not successful [PK]   0506 The 1st dose of metoprolol very quickly lower her rate from a proximally 240-140. [PK]   0751 CMP shows hyperkalemia, and elevated BUN and creatinine, also mild hyponatremia.  Troponin is normal.  BNP is mildly elevated.  TSH is normal.  Magnesium level is normal.  CBC is normal. [BB]   0752 . [BB]   0752 On maximum rate of Cardizem infusion patient's heart rate is still 125.  I will give her 0.5 of digoxin and 5 mg of Lopressor.   [BB]   0811 I have decided to admit patient and will discuss this with hospitalist on-call, Dr. Berry.   [BB]   0815 Chest x-ray by my interpretation shows no acute disease.  I discussed case with Internal Medicine physician, Dr. Berry who is on-call for hospitalist service.  Patient will be admitted under his care. [BB]      ED Course User Index  [BB] Ronal Ambrose MD  [PK] Petr Mclaughlin MD          Clinical Impression:   Final diagnoses:  [R00.2] Palpitations  [I48.91] Atrial fibrillation with RVR (Primary)  [I48.92] Atrial flutter, unspecified type  [R07.9] Chest pain        ED Disposition Condition    Observation                 Petr Mclaughlin MD  02/09/23 0207

## 2023-02-07 NOTE — HOSPITAL COURSE
2/8:  Plan for PHILIPPE with possible cardioversion today.    2/9:  Plan to switch to p.o. medication anticipate discharge tomorrow.    2/10: With ambulation, tachycardia to 140s to 150s.  Cardiology to further adjust medications.

## 2023-02-07 NOTE — ASSESSMENT & PLAN NOTE
Suspect obesity coupled with dehydration and frequent beta agonist use as contributing factors   Currently rate controlled with cardizem infusion   Anticoagulation with Lovenox 1mg/kg q12h  ECHO ordered; results pending    D-dimer ordered for possible VTE   Emphasized compliance with CPAP to Pt   Will monitor telemetry   May start Pt on amiodarone depending on response to cardizem

## 2023-02-07 NOTE — SUBJECTIVE & OBJECTIVE
Past Medical History:   Diagnosis Date    Asthma     Bilateral leg pain     Chronic low back pain     Compression of vein     Iliac vein compression syndrome    Digestive disorder     Edema of lower extremity     Gastroesophageal reflux disease     History of basal cell carcinoma (BCC) of skin 2022    Hx of phlebitis     Hx of thrombophlebitis     Hyperlipidemia     Hypertension     Lymphedema, not elsewhere classified     Morbid obesity     Other skin changes     Peripheral edema     Peripheral vascular disease, unspecified     Postartificial menopausal syndrome     Premature atrial contraction     Sleep apnea        Past Surgical History:   Procedure Laterality Date    ARTHROSCOPY OF KNEE Left 2021    Procedure: ARTHROSCOPY, KNEE;  Surgeon: Noman Huerta MD;  Location: [facility] OR;  Service: Orthopedics;  Laterality: Left;    BASAL CELL CARCINOMA EXCISION  2022    left eyebrow (Mohs w/ Dr. Garcia)     SECTION      CHOLECYSTECTOMY      EXCISION OF MEDIAL MENISCUS OF KNEE Left 2021    Procedure: MENISCECTOMY, KNEE, MEDIAL;  Surgeon: Noman Huerta MD;  Location: [facility] OR;  Service: Orthopedics;  Laterality: Left;    HYSTERECTOMY      KNEE ARTHROSCOPY W/ MENISCECTOMY Left 2021    Procedure: ARTHROSCOPY, KNEE, WITH MENISCECTOMY;  Surgeon: Noman Huerta MD;  Location: [facility] OR;  Service: Orthopedics;  Laterality: Left;  LATERAL MENISECTOMY    SPINE SURGERY      Lumbar Laminectomy, L4-L5       Review of patient's allergies indicates:   Allergen Reactions    Poppy Swelling    Aldomet amanda hcl injection      Headache       No current facility-administered medications on file prior to encounter.     Current Outpatient Medications on File Prior to Encounter   Medication Sig    albuterol (PROVENTIL/VENTOLIN HFA) 90 mcg/actuation inhaler Inhale 2 puffs into the lungs every 4 (four) hours as needed for Wheezing. Rescue    carvediloL (COREG) 12.5 MG tablet  TAKE ONE (1) TABLET BY MOUTH TWO (2) (TWO) TIMES DAILY WITH MEALS.    cloNIDine (CATAPRES) 0.1 MG tablet Take 1 tablet (0.1 mg total) by mouth once. for 1 dose    furosemide (LASIX) 20 MG tablet Take 1 tablet (20 mg total) by mouth once daily.    olmesartan (BENICAR) 40 MG tablet Take 1 tablet (40 mg total) by mouth once daily.    ondansetron (ZOFRAN) 4 MG tablet Take 2 tablets (8 mg total) by mouth 2 (two) times daily.    pantoprazole (PROTONIX) 40 MG tablet Take 1 tablet (40 mg total) by mouth once daily.    SYMBICORT 160-4.5 mcg/actuation HFAA Inhale 4 puffs into the lungs every 12 (twelve) hours.     Family History       Problem Relation (Age of Onset)    Diabetes Mellitus Mother, Sister, Brother    Heart disease Father, Brother    Hypertension Father          Tobacco Use    Smoking status: Never    Smokeless tobacco: Never   Substance and Sexual Activity    Alcohol use: Never    Drug use: Never    Sexual activity: Not on file     Review of Systems   Constitutional: Positive for diaphoresis and malaise/fatigue. Negative for chills.   Eyes:  Negative for visual disturbance.   Cardiovascular:  Positive for irregular heartbeat, leg swelling (chronic), near-syncope and palpitations. Negative for paroxysmal nocturnal dyspnea and syncope.   Respiratory:  Positive for shortness of breath.    Gastrointestinal:  Negative for abdominal pain, constipation, nausea and vomiting.   Objective:     Vital Signs (Most Recent):  Temp: 97.8 °F (36.6 °C) (02/07/23 0442)  Pulse: (!) 121 (02/07/23 1627)  Resp: 12 (02/07/23 1627)  BP: 137/79 (02/07/23 1627)  SpO2: 98 % (02/07/23 1627)   Vital Signs (24h Range):  Temp:  [97.8 °F (36.6 °C)] 97.8 °F (36.6 °C)  Pulse:  [] 121  Resp:  [12-26] 12  SpO2:  [95 %-100 %] 98 %  BP: (116-238)/() 137/79     Weight: 135.6 kg (299 lb)  Body mass index is 48.26 kg/m².    SpO2: 98 %         Intake/Output Summary (Last 24 hours) at 2/7/2023 9869  Last data filed at 2/7/2023 2989  Gross per  24 hour   Intake 1000 ml   Output --   Net 1000 ml       Lines/Drains/Airways       Peripheral Intravenous Line  Duration                  Peripheral IV - Single Lumen 02/07/23 0639 20 G Left Antecubital <1 day                    Physical Exam  Vitals reviewed.   Constitutional:       General: She is not in acute distress.     Appearance: She is obese. She is not ill-appearing, toxic-appearing or diaphoretic.   HENT:      Head: Normocephalic and atraumatic.      Nose: No rhinorrhea.   Eyes:      General: No scleral icterus.        Right eye: No discharge.   Cardiovascular:      Rate and Rhythm: Tachycardia present. Rhythm irregular.      Heart sounds: No murmur heard.    No friction rub. No gallop.   Pulmonary:      Effort: No respiratory distress.      Breath sounds: No stridor. No wheezing, rhonchi or rales.   Abdominal:      Tenderness: There is no abdominal tenderness. There is no guarding or rebound.   Musculoskeletal:         General: Swelling present.      Right lower leg: Edema present.      Left lower leg: Edema present.   Skin:     General: Skin is warm and dry.      Coloration: Skin is not jaundiced.   Neurological:      Mental Status: She is alert and oriented to person, place, and time.       Significant Labs: CMP   Recent Labs   Lab 02/07/23  0455   *   K 5.2*      CO2 22   *   BUN 21*   CREATININE 1.50*   CALCIUM 9.6   PROT 7.9   ALBUMIN 4.6   BILITOT 0.7   ALKPHOS 98   AST 18   ALT 21   ANIONGAP 18*       Significant Imaging: X-Ray: CXR: X-Ray Chest 1 View (CXR):   Results for orders placed or performed during the hospital encounter of 02/07/23   X-Ray Chest 1 View    Narrative    EXAMINATION:  XR CHEST 1 VIEW    CLINICAL HISTORY:  Palpitations    COMPARISON:  7 February 2020    TECHNIQUE:  XR CHEST 1 VIEW    FINDINGS:  The heart and mediastinum are normal in size and configuration.  The pulmonary vascularity is normal in caliber.  No lung infiltrates, effusions, pneumothorax or  other abnormality is demonstrated.      Impression    No evidence of acute cardiopulmonary disease.      Electronically signed by: Francesco Eagle  Date:    02/07/2023  Time:    07:50

## 2023-02-07 NOTE — ASSESSMENT & PLAN NOTE
May be 2/2 to dehydration as well as hypoperfusion 2/2 to tachyarrhythmia  IVF   Will continue to monitor BMP

## 2023-02-07 NOTE — Clinical Note
Patient transported to 430 via bed.  Patient tolerated well.  Bedside report and assessment with JEFF Sullivan.  VS stable.  Family at bedside.

## 2023-02-07 NOTE — HPI
"Pt is a 62 y.o.f. with hx of asthma (reports daily symbicort use), GERD, hypertension, hyperlipidemia, obesity, sleep apnea (compliant with nightly CPAP) and May-Thurner Syndrome presenting with palpitations. Pt's daughter and  are in the room at the time of my exam. Pt works as an employee with the [employer] and felt a sensation which she describes as like having "bubbles" in her chest last night around 0400. 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 Pt has been experiencing similar episodes of a "bubble" sensation in her chest for the last few weeks. Each episode has lasted approx. 10-15 minutes and is accompanied by fatigue. Pt reports some SOB, diaphoresis, and a sensation of near syncope but denies chest pain, visual disturbances, fevers, chills and nausea.      Of note, Pt was seen by Dr. Marcos in April of last year. Pt was found to have a frequent PAC burden but no evidence of Afib. Echo and stress test which were performed in early 2020 were both documented to be unremarkable.    Workup thus far is notable for the following: CXR reveals no acute cardiopulmonary process. Latest EKG reveals atrial flutter with a rate of 124. Troponins were negative for acute ischemia x 2. NT-proBNP was 1447.  Potassium was mildly elevated at 5.2. Magnesium was wnl. TSH was wnl. H/H were wnl.  In the ED, Pt was initially treated with 6mg then 12mg of adenosine with brief approx. 10 second conversion to sinus rhythm. Her HR then increased back to 245 bpm. Pt was subsequently treated with IV Lopressor as well as Cardizem infusion and 1 dose of digoxin with successful reduction of her rate.     Cardiology has been consulted for evaluation and management of A-flutter  "

## 2023-02-07 NOTE — SUBJECTIVE & OBJECTIVE
Past Medical History:   Diagnosis Date    Asthma     Bilateral leg pain     Chronic low back pain     Compression of vein     Iliac vein compression syndrome    Digestive disorder     Edema of lower extremity     Gastroesophageal reflux disease     History of basal cell carcinoma (BCC) of skin 2022    Hx of phlebitis     Hx of thrombophlebitis     Hyperlipidemia     Hypertension     Lymphedema, not elsewhere classified     Morbid obesity     Other skin changes     Peripheral edema     Peripheral vascular disease, unspecified     Postartificial menopausal syndrome     Premature atrial contraction     Sleep apnea        Past Surgical History:   Procedure Laterality Date    ARTHROSCOPY OF KNEE Left 2021    Procedure: ARTHROSCOPY, KNEE;  Surgeon: Noman Huerta MD;  Location: [facility] OR;  Service: Orthopedics;  Laterality: Left;    BASAL CELL CARCINOMA EXCISION  2022    left eyebrow (Mohs w/ Dr. Garcia)     SECTION      CHOLECYSTECTOMY      EXCISION OF MEDIAL MENISCUS OF KNEE Left 2021    Procedure: MENISCECTOMY, KNEE, MEDIAL;  Surgeon: Noman Huerta MD;  Location: [facility] OR;  Service: Orthopedics;  Laterality: Left;    HYSTERECTOMY      KNEE ARTHROSCOPY W/ MENISCECTOMY Left 2021    Procedure: ARTHROSCOPY, KNEE, WITH MENISCECTOMY;  Surgeon: Noman Huerta MD;  Location: [facility] OR;  Service: Orthopedics;  Laterality: Left;  LATERAL MENISECTOMY    SPINE SURGERY      Lumbar Laminectomy, L4-L5       Review of patient's allergies indicates:   Allergen Reactions    Poppy Swelling    Aldomet amanda hcl injection      Headache       No current facility-administered medications on file prior to encounter.     Current Outpatient Medications on File Prior to Encounter   Medication Sig    albuterol (PROVENTIL/VENTOLIN HFA) 90 mcg/actuation inhaler Inhale 2 puffs into the lungs every 4 (four) hours as needed for Wheezing. Rescue    carvediloL (COREG) 12.5 MG tablet  TAKE ONE (1) TABLET BY MOUTH TWO (2) (TWO) TIMES DAILY WITH MEALS.    cloNIDine (CATAPRES) 0.1 MG tablet Take 1 tablet (0.1 mg total) by mouth once. for 1 dose    furosemide (LASIX) 20 MG tablet Take 1 tablet (20 mg total) by mouth once daily.    olmesartan (BENICAR) 40 MG tablet Take 1 tablet (40 mg total) by mouth once daily.    ondansetron (ZOFRAN) 4 MG tablet Take 2 tablets (8 mg total) by mouth 2 (two) times daily.    pantoprazole (PROTONIX) 40 MG tablet Take 1 tablet (40 mg total) by mouth once daily.    SYMBICORT 160-4.5 mcg/actuation HFAA Inhale 4 puffs into the lungs every 12 (twelve) hours.     Family History       Problem Relation (Age of Onset)    Diabetes Mellitus Mother, Sister, Brother    Heart disease Father, Brother    Hypertension Father          Tobacco Use    Smoking status: Never    Smokeless tobacco: Never   Substance and Sexual Activity    Alcohol use: Never    Drug use: Never    Sexual activity: Not on file     Review of Systems   Constitutional: Positive for diaphoresis and malaise/fatigue. Negative for chills, fever and night sweats.   Cardiovascular:  Positive for irregular heartbeat, leg swelling (chronic) and palpitations. Negative for dyspnea on exertion, near-syncope, orthopnea, paroxysmal nocturnal dyspnea and syncope.   Respiratory:  Positive for shortness of breath. Negative for cough.    Gastrointestinal:  Negative for nausea and vomiting.   Neurological:  Positive for light-headedness. Negative for headaches and weakness.   Objective:     Vital Signs (Most Recent):  Temp: 97.8 °F (36.6 °C) (02/07/23 0442)  Pulse: 107 (02/07/23 1442)  Resp: 20 (02/07/23 1442)  BP: 123/69 (02/07/23 1442)  SpO2: 96 % (02/07/23 1442) Vital Signs (24h Range):  Temp:  [97.8 °F (36.6 °C)] 97.8 °F (36.6 °C)  Pulse:  [107-246] 107  Resp:  [12-26] 20  SpO2:  [96 %-100 %] 96 %  BP: (123-238)/() 123/69     Weight: 135.6 kg (299 lb)  Body mass index is 48.26 kg/m².    SpO2: 96 %         Intake/Output  Summary (Last 24 hours) at 2/7/2023 1641  Last data filed at 2/7/2023 0728  Gross per 24 hour   Intake 1000 ml   Output --   Net 1000 ml       Lines/Drains/Airways       Peripheral Intravenous Line  Duration                  Peripheral IV - Single Lumen 02/07/23 0639 20 G Left Antecubital <1 day                    Physical Exam  Vitals reviewed.   Constitutional:       General: She is not in acute distress.     Appearance: She is obese. She is not ill-appearing, toxic-appearing or diaphoretic.      Comments: Pt is morbidly obese    HENT:      Head: Normocephalic and atraumatic.      Mouth/Throat:      Mouth: Mucous membranes are moist.      Pharynx: No oropharyngeal exudate or posterior oropharyngeal erythema.   Eyes:      General: No scleral icterus.        Right eye: No discharge.         Left eye: No discharge.   Cardiovascular:      Rate and Rhythm: Tachycardia present. Rhythm irregular.      Heart sounds: No murmur heard.    No friction rub. No gallop.   Pulmonary:      Effort: Pulmonary effort is normal. No respiratory distress.      Breath sounds: No wheezing, rhonchi or rales.   Abdominal:      General: There is no distension.      Tenderness: There is no abdominal tenderness. There is no guarding.   Musculoskeletal:         General: Swelling present.      Right lower leg: Edema present.      Left lower leg: Edema present.   Skin:     General: Skin is warm and dry.   Neurological:      General: No focal deficit present.      Mental Status: She is alert and oriented to person, place, and time.       Significant Labs:   CMP  Sodium   Date Value Ref Range Status   02/07/2023 135 (L) 136 - 145 mmol/L Final     Potassium   Date Value Ref Range Status   02/07/2023 5.2 (H) 3.5 - 5.1 mmol/L Final     Chloride   Date Value Ref Range Status   02/07/2023 100 98 - 107 mmol/L Final     CO2   Date Value Ref Range Status   02/07/2023 22 21 - 32 mmol/L Final     Glucose   Date Value Ref Range Status   02/07/2023 124 (H) 74  - 106 mg/dL Final     BUN   Date Value Ref Range Status   02/07/2023 21 (H) 7 - 18 mg/dL Final     Creatinine   Date Value Ref Range Status   02/07/2023 1.50 (H) 0.55 - 1.02 mg/dL Final     Calcium   Date Value Ref Range Status   02/07/2023 9.6 8.5 - 10.1 mg/dL Final     Total Protein   Date Value Ref Range Status   02/07/2023 7.9 6.4 - 8.2 g/dL Final     Albumin   Date Value Ref Range Status   02/07/2023 4.6 3.5 - 5.0 g/dL Final     Bilirubin, Total   Date Value Ref Range Status   02/07/2023 0.7 >0.0 - 1.2 mg/dL Final     Alk Phos   Date Value Ref Range Status   02/07/2023 98 50 - 130 U/L Final     AST   Date Value Ref Range Status   02/07/2023 18 15 - 37 U/L Final     ALT   Date Value Ref Range Status   02/07/2023 21 13 - 56 U/L Final     Anion Gap   Date Value Ref Range Status   02/07/2023 18 (H) 7 - 16 mmol/L Final     eGFR   Date Value Ref Range Status   02/07/2023 39 (L) >=60 mL/min/1.73m² Final     Lab Results   Component Value Date    WBC 10.57 02/07/2023    HGB 13.9 02/07/2023    HCT 44.3 02/07/2023    MCV 92.7 02/07/2023     (H) 02/07/2023             Significant Imaging: X-Ray: CXR: X-Ray Chest 1 View (CXR):   Results for orders placed or performed during the hospital encounter of 02/07/23   X-Ray Chest 1 View    Narrative    EXAMINATION:  XR CHEST 1 VIEW    CLINICAL HISTORY:  Palpitations    COMPARISON:  7 February 2020    TECHNIQUE:  XR CHEST 1 VIEW    FINDINGS:  The heart and mediastinum are normal in size and configuration.  The pulmonary vascularity is normal in caliber.  No lung infiltrates, effusions, pneumothorax or other abnormality is demonstrated.      Impression    No evidence of acute cardiopulmonary disease.      Electronically signed by: Francesco Eagle  Date:    02/07/2023  Time:    07:50

## 2023-02-07 NOTE — Clinical Note
Daughter, Sheila, called to notify of case end. Physician speaking with her about procedure results.Plans for therapy relayed. Verbalized understanding.

## 2023-02-07 NOTE — ASSESSMENT & PLAN NOTE
BP appears to be well-controlled at this time   Home BP meds appear to be currently help   Hydralazine prn with parameters ordered

## 2023-02-07 NOTE — CONSULTS
"[facility] - Emergency Department  Cardiology  Consult Note    Patient Name: Kat Nam  MRN: [id number]  Admission Date: 2/7/2023  Hospital Length of Stay: 0 days  Code Status: Full Code   Attending Provider: George Berry MD   Consulting Provider: Zak Orr DO  Primary Care Physician: Bony Mary MD  Principal Problem:Tachyarrhythmia    Patient information was obtained from patient and ER records.     Consults  Subjective:     Chief Complaint:  Palpitations      HPI:   Pt is a 62 y.o.f. with hx of asthma (reports daily symbicort use), GERD, hypertension, hyperlipidemia, obesity, sleep apnea (compliant with nightly CPAP) and May-Thurner Syndrome presenting with palpitations. Pt's daughter and  are in the room at the time of my exam. Pt works as an employee with the [employer] and felt a sensation which she describes as like having "bubbles" in her chest last night around 0400. 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 Pt has been experiencing similar episodes of a "bubble" sensation in her chest for the last few weeks. Each episode has lasted approx. 10-15 minutes and is accompanied by fatigue. Pt reports some SOB, diaphoresis, and a sensation of near syncope but denies chest pain, visual disturbances, fevers, chills and nausea.      Of note, Pt was seen by Dr. Marcos in April of last year. Pt was found to have a frequent PAC burden but no evidence of Afib. Echo and stress test which were performed in early 2020 were both documented to be unremarkable.    Workup thus far is notable for the following: CXR reveals no acute cardiopulmonary process. Latest EKG reveals atrial flutter with a rate of 124. Troponins were negative for acute ischemia x 2. NT-proBNP was 1447. Bun and Cr were elevated at 21 and 1.5; these values were 23 and 1.11 4 months ago. Potassium was mildly elevated at 5.2. Mg, " TSH, and H/H were wnl.  In the ED, Pt was initially treated with 6mg then 12mg of adenosine with brief approx. 10 second conversion to sinus rhythm. Her HR then increased back to 245 bpm. Pt was subsequently treated with IV Lopressor as well as Cardizem infusion and 1 dose of digoxin with successful reduction of her rate.     Cardiology has been consulted for evaluation and management of A-flutter      Past Medical History:   Diagnosis Date    Asthma     Bilateral leg pain     Chronic low back pain     Compression of vein     Iliac vein compression syndrome    Digestive disorder     Edema of lower extremity     Gastroesophageal reflux disease     History of basal cell carcinoma (BCC) of skin 2022    Hx of phlebitis     Hx of thrombophlebitis     Hyperlipidemia     Hypertension     Lymphedema, not elsewhere classified     Morbid obesity     Other skin changes     Peripheral edema     Peripheral vascular disease, unspecified     Postartificial menopausal syndrome     Premature atrial contraction     Sleep apnea        Past Surgical History:   Procedure Laterality Date    ARTHROSCOPY OF KNEE Left 2021    Procedure: ARTHROSCOPY, KNEE;  Surgeon: Noman Huerta MD;  Location: [facility] OR;  Service: Orthopedics;  Laterality: Left;    BASAL CELL CARCINOMA EXCISION  2022    left eyebrow (Mohs w/ Dr. Garcia)     SECTION      CHOLECYSTECTOMY      EXCISION OF MEDIAL MENISCUS OF KNEE Left 2021    Procedure: MENISCECTOMY, KNEE, MEDIAL;  Surgeon: Noman Huerta MD;  Location: [facility] OR;  Service: Orthopedics;  Laterality: Left;    HYSTERECTOMY      KNEE ARTHROSCOPY W/ MENISCECTOMY Left 2021    Procedure: ARTHROSCOPY, KNEE, WITH MENISCECTOMY;  Surgeon: Noman Huerta MD;  Location: [facility] OR;  Service: Orthopedics;  Laterality: Left;  LATERAL MENISECTOMY    SPINE SURGERY      Lumbar Laminectomy, L4-L5       Review of patient's allergies indicates:   Allergen  Reactions    Poppy Swelling    Aldomet amanda hcl injection      Headache       No current facility-administered medications on file prior to encounter.     Current Outpatient Medications on File Prior to Encounter   Medication Sig    albuterol (PROVENTIL/VENTOLIN HFA) 90 mcg/actuation inhaler Inhale 2 puffs into the lungs every 4 (four) hours as needed for Wheezing. Rescue    carvediloL (COREG) 12.5 MG tablet TAKE ONE (1) TABLET BY MOUTH TWO (2) (TWO) TIMES DAILY WITH MEALS.    cloNIDine (CATAPRES) 0.1 MG tablet Take 1 tablet (0.1 mg total) by mouth once. for 1 dose    furosemide (LASIX) 20 MG tablet Take 1 tablet (20 mg total) by mouth once daily.    olmesartan (BENICAR) 40 MG tablet Take 1 tablet (40 mg total) by mouth once daily.    ondansetron (ZOFRAN) 4 MG tablet Take 2 tablets (8 mg total) by mouth 2 (two) times daily.    pantoprazole (PROTONIX) 40 MG tablet Take 1 tablet (40 mg total) by mouth once daily.    SYMBICORT 160-4.5 mcg/actuation HFAA Inhale 4 puffs into the lungs every 12 (twelve) hours.     Family History       Problem Relation (Age of Onset)    Diabetes Mellitus Mother, Sister, Brother    Heart disease Father, Brother    Hypertension Father          Tobacco Use    Smoking status: Never    Smokeless tobacco: Never   Substance and Sexual Activity    Alcohol use: Never    Drug use: Never    Sexual activity: Not on file     Review of Systems   Constitutional: Positive for diaphoresis and malaise/fatigue. Negative for chills.   Eyes:  Negative for visual disturbance.   Cardiovascular:  Positive for irregular heartbeat, leg swelling (chronic), near-syncope and palpitations. Negative for paroxysmal nocturnal dyspnea and syncope.   Respiratory:  Positive for shortness of breath.    Gastrointestinal:  Negative for abdominal pain, constipation, nausea and vomiting.   Objective:     Vital Signs (Most Recent):  Temp: 97.8 °F (36.6 °C) (02/07/23 0442)  Pulse: (!) 121 (02/07/23 1627)  Resp: 12 (02/07/23  1627)  BP: 137/79 (02/07/23 1627)  SpO2: 98 % (02/07/23 1627)   Vital Signs (24h Range):  Temp:  [97.8 °F (36.6 °C)] 97.8 °F (36.6 °C)  Pulse:  [] 121  Resp:  [12-26] 12  SpO2:  [95 %-100 %] 98 %  BP: (116-238)/() 137/79     Weight: 135.6 kg (299 lb)  Body mass index is 48.26 kg/m².    SpO2: 98 %         Intake/Output Summary (Last 24 hours) at 2/7/2023 1724  Last data filed at 2/7/2023 0728  Gross per 24 hour   Intake 1000 ml   Output --   Net 1000 ml       Lines/Drains/Airways       Peripheral Intravenous Line  Duration                  Peripheral IV - Single Lumen 02/07/23 0639 20 G Left Antecubital <1 day                    Physical Exam  Vitals reviewed.   Constitutional:       General: She is not in acute distress.     Appearance: She is obese. She is not ill-appearing, toxic-appearing or diaphoretic.   HENT:      Head: Normocephalic and atraumatic.      Nose: No rhinorrhea.   Eyes:      General: No scleral icterus.        Right eye: No discharge.   Cardiovascular:      Rate and Rhythm: Tachycardia present. Rhythm irregular.      Heart sounds: No murmur heard.    No friction rub. No gallop.   Pulmonary:      Effort: No respiratory distress.      Breath sounds: No stridor. No wheezing, rhonchi or rales.   Abdominal:      Tenderness: There is no abdominal tenderness. There is no guarding or rebound.   Musculoskeletal:         General: Swelling present.      Right lower leg: Edema present.      Left lower leg: Edema present.   Skin:     General: Skin is warm and dry.      Coloration: Skin is not jaundiced.   Neurological:      Mental Status: She is alert and oriented to person, place, and time.       Significant Labs: CMP   Recent Labs   Lab 02/07/23  0455   *   K 5.2*      CO2 22   *   BUN 21*   CREATININE 1.50*   CALCIUM 9.6   PROT 7.9   ALBUMIN 4.6   BILITOT 0.7   ALKPHOS 98   AST 18   ALT 21   ANIONGAP 18*       Significant Imaging: X-Ray: CXR: X-Ray Chest 1 View (CXR):    Results for orders placed or performed during the hospital encounter of 02/07/23   X-Ray Chest 1 View    Narrative    EXAMINATION:  XR CHEST 1 VIEW    CLINICAL HISTORY:  Palpitations    COMPARISON:  7 February 2020    TECHNIQUE:  XR CHEST 1 VIEW    FINDINGS:  The heart and mediastinum are normal in size and configuration.  The pulmonary vascularity is normal in caliber.  No lung infiltrates, effusions, pneumothorax or other abnormality is demonstrated.      Impression    No evidence of acute cardiopulmonary disease.      Electronically signed by: Francesco Eagle  Date:    02/07/2023  Time:    07:50     Assessment and Plan:     * Tachyarrhythmia  Suspect obesity coupled with dehydration and frequent beta agonist use as contributing factors   Currently rate controlled with cardizem infusion   Anticoagulation with Lovenox 1mg/kg q12h  ECHO ordered; results pending    D-dimer ordered for possible VTE   Emphasized compliance with CPAP to Pt   Will monitor telemetry   May start Pt on amiodarone depending on response to cardizem     HTN (hypertension)  BP appears to be well-controlled at this time   Home BP meds appear to be currently held  Hydralazine prn with parameters ordered     AMEE (acute kidney injury)  May be 2/2 to dehydration as well as hypoperfusion due to tachyarrhythmia  IVF   Will continue to monitor BMP     HENRY (obstructive sleep apnea)  CPAP QHS ordered         VTE Risk Mitigation (From admission, onward)           Ordered     enoxaparin injection 140 mg  Every 12 hours (non-standard times)         02/07/23 1653     Reason for No Pharmacological VTE Prophylaxis  Once        Question:  Reasons:  Answer:  Already adequately anticoagulated on oral Anticoagulants    02/07/23 0831     Place sequential compression device  Until discontinued         02/07/23 0831     IP VTE HIGH RISK PATIENT  Once         02/07/23 0831                    Thank you for your consult. I will follow-up with patient. Please contact  us if you have any additional questions.    Zak Orr, DO  Cardiology   [facility] - Emergency Department  Pt seen and examined, chart reviewed, old records reviewed.  CC Palpitations, feeling her heart race and pound, occurs at rest  spontaneously, lasts three to ten minutes, resolves spontaneously if she waits it out.  She notes symptoms present for several weeks.     PE: agree with above  Labs, Xrays, EKGs reviewed    IMP/Plan:   A fib with RVR, ventricular response rate controlled on IV diltiazem, will monitor overnight, plan  PHILIPPE cardioversion in AM if remains in Afib.

## 2023-02-07 NOTE — HPI
Ms. Nam is a 61yo woman h/o asthma, GERD, HTN, HLD, Class 3 obesity, HENRY on CPAP, premature atrial contractions with bigeminy, May-Thurner syndrome who presents with progressively worsening palpitations and shortness of breath.  She describes the palpitations or chest as feeling like bubbles.  She states that the bubbles have been more frequent over the last few days over the last 3 weeks.      She states that she works 7 on 7 off during the night shift in the [employer] and this alters her sleep cycle.  She thought that these palpitations were relatively normal given her abnormal sleep patterns.  Furthermore she is had palpitations in the past.  However she now has shortness of breath so this has become more concerning for her.  She denies any chest pain.  She notes that she intermittently has lower extremity swelling but she has May Thurner syndrome and she has had discolorations and intermittent swelling of her lower extremities.  She states she was seen by Dr. Marcos for PACs and there were no changes in her management.        ER course:   Patient presented with pulse of 245 and blood pressure 238/59.  She was given multiple medications as detailed below.  She experienced a pause after having adenosine but did not reveal the underlying rhythm.  There were flutter waves present.    Initial Vitals   BP Pulse Resp Temp SpO2   02/07/23 0444 02/07/23 0442 02/07/23 0442 02/07/23 0442 02/07/23 0442   (!) 238/59 (!) 245 (!) 26 97.8 °F (36.6 °C) 98      Medications   diltiaZEM (CARDIZEM) 125 mg in dextrose 5 % (D5W) 125 mL infusion (15 mg/hr Intravenous Rate/Dose Change 2/7/23 0709)   digoxin injection 500 mcg (has no administration in time range)   metoprolol injection 5 mg (has no administration in time range)   metoprolol injection 5 mg (5 mg Intravenous Given 2/7/23 0530)   sodium chloride 0.9% bolus 1,000 mL 1,000 mL (0 mLs Intravenous Stopped 2/7/23 0728)   adenosine injection 12 mg (12 mg Intravenous  "Given 2/7/23 0530)   adenosine injection 6 mg (6 mg Intravenous Given 2/7/23 0530)   metoprolol injection 5 mg (5 mg Intravenous Given 2/7/23 0531)       HPI per cardiology team:     Pt is a 62 y.o.f. with hx of asthma (reports daily symbicort use), GERD, hypertension, hyperlipidemia, obesity, sleep apnea (compliant with nightly CPAP) and May-Thurner Syndrome presenting with palpitations. Pt's daughter and  are in the room at the time of my exam. Pt works as an employee with the [employer] and felt a sensation which she describes as like having "bubbles" in her chest last night around 0400. 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 Pt has been experiencing similar episodes of a "bubble" sensation in her chest for the last few weeks. Each episode has lasted approx. 10-15 minutes and is accompanied by weakness. Pt reports some SOB, diaphoresis, and a sensation of near syncope but denies chest pain, visual disturbances, fevers, chills and nausea.      Of note, Pt was seen by Dr. Marcos in April of last year. Pt was found to have a frequent PAC burden but no evidence of Afib. Echo and stress test which were performed in early 2020 were both documented to be unremarkable.    Workup thus far is notable for the following: CXR reveals no acute cardiopulmonary process. Latest EKG reveals atrial flutter with a rate of 124. Troponins were negative for acute ischemia x 2. NT-proBNP was 1447.  Potassium was mildly elevated at 5.2. Magnesium was wnl. TSH was wnl. H/H were wnl.  In the ED, Pt was initially treated with 6mg then 12mg of adenosine with brief approx. 10 second conversion to sinus rhythm. Her HR then increased back to 245 bpm. Pt was subsequently treated with IV Lopressor as well as Cardizem infusion and 1 dose of digoxin with successful reduction of her rate.     Cardiology has been consulted for evaluation and management of " A-flutter.

## 2023-02-07 NOTE — ED PROVIDER NOTES
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 - pt obese     HPI  Review of patient's allergies indicates:   Allergen Reactions    Poppy Swelling    Aldomet amanda hcl injection      Headache     Past Medical History:   Diagnosis Date    Asthma     Bilateral leg pain     Chronic low back pain     Compression of vein     Iliac vein compression syndrome    Digestive disorder     Edema of lower extremity     Gastroesophageal reflux disease     History of basal cell carcinoma (BCC) of skin 2022    Hx of phlebitis     Hx of thrombophlebitis     Hyperlipidemia     Hypertension     Lymphedema, not elsewhere classified     Morbid obesity     Other skin changes     Peripheral edema     Peripheral vascular disease, unspecified     Postartificial menopausal syndrome     Premature atrial contraction     Sleep apnea      Past Surgical History:   Procedure Laterality Date    ARTHROSCOPY OF KNEE Left 2021    Procedure: ARTHROSCOPY, KNEE;  Surgeon: Noman Huerta MD;  Location: [facility] OR;  Service: Orthopedics;  Laterality: Left;    BASAL CELL CARCINOMA EXCISION  2022    left eyebrow (Mohs w/ Dr. Garcia)     SECTION      CHOLECYSTECTOMY      EXCISION OF MEDIAL MENISCUS OF KNEE Left 2021    Procedure: MENISCECTOMY, KNEE, MEDIAL;  Surgeon: Noman Huerta MD;  Location: [facility] OR;  Service: Orthopedics;  Laterality: Left;    HYSTERECTOMY      KNEE ARTHROSCOPY W/ MENISCECTOMY Left 2021    Procedure: ARTHROSCOPY, KNEE, WITH MENISCECTOMY;  Surgeon: Noman Huerta MD;  Location: [facility];  Service: Orthopedics;  Laterality: Left;  LATERAL MENISECTOMY    SPINE SURGERY      Lumbar Laminectomy, L4-L5     Family History   Problem Relation Age of Onset    Diabetes Mellitus Mother     Heart disease Father     Hypertension Father     Diabetes Mellitus Sister      Diabetes Mellitus Brother     Heart disease Brother      Social History     Tobacco Use    Smoking status: Never    Smokeless tobacco: Never   Substance Use Topics    Alcohol use: Never    Drug use: Never     Review of Systems    Physical Exam     Initial Vitals   BP Pulse Resp Temp SpO2   02/07/23 0444 02/07/23 0442 02/07/23 0442 02/07/23 0442 02/07/23 0442   (!) 238/59 (!) 245 (!) 26 97.8 °F (36.6 °C) 98 %      MAP       --                Physical Exam    Medical Screening Exam   See Full Note    ED Course   Procedures  Labs Reviewed   COMPREHENSIVE METABOLIC PANEL - Abnormal; Notable for the following components:       Result Value    Sodium 135 (*)     Potassium 5.2 (*)     Anion Gap 18 (*)     Glucose 124 (*)     BUN 21 (*)     Creatinine 1.50 (*)     eGFR 39 (*)     All other components within normal limits   NT-PRO NATRIURETIC PEPTIDE - Abnormal; Notable for the following components:    ProBNP 1,447 (*)     All other components within normal limits   CBC WITH DIFFERENTIAL - Abnormal; Notable for the following components:    MCHC 31.4 (*)     Platelet Count 414 (*)     Neutrophils % 69.5 (*)     Lymphocytes % 20.3 (*)     Eosinophils % 4.4 (*)     All other components within normal limits   TSH - Normal   TROPONIN I - Normal   MAGNESIUM - Normal   CBC W/ AUTO DIFFERENTIAL    Narrative:     The following orders were created for panel order CBC auto differential.  Procedure                               Abnormality         Status                     ---------                               -----------         ------                     CBC with Differential[[id number]]        Abnormal            Final result                 Please view results for these tests on the individual orders.   EXTRA TUBES    Narrative:     The following orders were created for panel order EXTRA TUBES.  Procedure                               Abnormality         Status                     ---------                                -----------         ------                     Light Blue Top Hold[[id number]]                              In process                 Light Green Top Hold[[id number]]                                                        Lavender Top Hold[[id number]]                                                           Gold Top Hold[[id number]]                                                                 Please view results for these tests on the individual orders.   LIGHT BLUE TOP HOLD        ECG Results              EKG 12-lead (In process)  Result time 02/07/23 06:14:52      In process by Interface, Lab In [facility] (02/07/23 06:14:52)                   Narrative:    Test Reason : R00.2,    Vent. Rate : 123 BPM     Atrial Rate : 000 BPM     P-R Int : 000 ms          QRS Dur : 098 ms      QT Int : 304 ms       P-R-T Axes : 000 040 069 degrees     QTc Int : 414 ms     CONSIDER ACUTE STEMI   Atrial flutter  with rapid ventricular response  with 2:1 A-V block  Lateral ST elevation , CONSIDER ACUTE INFARCT  Inferior and anterior T wave abnormality  is nonspecific  Abnormal ECG      Referred By: [email or web address]   SELF           Confirmed By:                                      EKG 12-lead (In process)  Result time 02/07/23 06:14:42      In process by Interface, Lab In [facility] (02/07/23 06:14:42)                   Narrative:    Test Reason : R00.2,    Vent. Rate : 128 BPM     Atrial Rate : 000 BPM     P-R Int : 000 ms          QRS Dur : 092 ms      QT Int : 280 ms       P-R-T Axes : 000 041 091 degrees     QTc Int : 399 ms    Possible atrial flutter  with rapid ventricular response  Widespread T wave abnormality  is nonspecific  Abnormal ECG      Referred By: [email or web address]   SELF           Confirmed By:                                      EKG 12-lead (In process)  Result time 02/07/23 06:14:34      In process by Interface, Lab In [facility] (02/07/23 06:14:34)                   Narrative:    Test Reason : R00.2,    Vent. Rate : 244 BPM      Atrial Rate : 000 BPM     P-R Int : 000 ms          QRS Dur : 092 ms      QT Int : 224 ms       P-R-T Axes : 000 061 253 degrees     QTc Int : 546 ms     EXTREME TACHYCARDIA   Probable atrial fibrillation  with uncontrolled ventricular response  Widespread ST-T abnormality  may be due to myocardial ischemia  Abnormal ECG      Referred By: [email or web address]   SELF           Confirmed By:                                      EKG 12-lead (In process)  Result time 02/07/23 05:54:30      In process by Interface, Lab In [facility] (02/07/23 05:54:30)                   Narrative:    Test Reason : R00.2,    Vent. Rate : 240 BPM     Atrial Rate : 000 BPM     P-R Int : 000 ms          QRS Dur : 094 ms      QT Int : 232 ms       P-R-T Axes : 000 065 243 degrees     QTc Int : 547 ms     EXTREME TACHYCARDIA   Atrial flutter  with uncontrolled ventricular response  Widespread ST-T abnormality  may be due to myocardial ischemia  Abnormal ECG      Referred By: MICHELLE   SELF           Confirmed By:                                   Imaging Results              X-Ray Chest 1 View (Final result)  Result time 02/07/23 07:50:44      Final result by Francesco Eagle II, MD (02/07/23 07:50:44)                   Impression:      No evidence of acute cardiopulmonary disease.      Electronically signed by: Francesco Eagle  Date:    02/07/2023  Time:    07:50               Narrative:    EXAMINATION:  XR CHEST 1 VIEW    CLINICAL HISTORY:  Palpitations    COMPARISON:  7 February 2020    TECHNIQUE:  XR CHEST 1 VIEW    FINDINGS:  The heart and mediastinum are normal in size and configuration.  The pulmonary vascularity is normal in caliber.  No lung infiltrates, effusions, pneumothorax or other abnormality is demonstrated.                                       Medications   diltiaZEM (CARDIZEM) 125 mg in dextrose 5 % (D5W) 125 mL infusion (15 mg/hr Intravenous Rate/Dose Change 2/7/23 0709)   digoxin injection 500 mcg (has no administration in time  range)   metoprolol injection 5 mg (has no administration in time range)   metoprolol injection 5 mg (5 mg Intravenous Given 2/7/23 0530)   sodium chloride 0.9% bolus 1,000 mL 1,000 mL (0 mLs Intravenous Stopped 2/7/23 0728)   adenosine injection 12 mg (12 mg Intravenous Given 2/7/23 0530)   adenosine injection 6 mg (6 mg Intravenous Given 2/7/23 0530)   metoprolol injection 5 mg (5 mg Intravenous Given 2/7/23 0531)                 ED Course as of 02/07/23 0817 Tue Feb 07, 2023   0501 Patient presents with severe narrow complex tachycardia that was not responsive to vagal maneuvers.  Treated with 6 mg of adenosine and with 12 mg of adenosine both times patient did convert to normal sinus rhythm but only for about 10 seconds and then reverted back to the heart rate of around 245.  During the block did note atrial flutter.  Case discussed with cardiologist Dr. Wyman will plan on using IV metoprolol up to 3 doses to assess response and then will consider cardioversion if that is not successful [PK]   0506 The 1st dose of metoprolol very quickly lower her rate from a proximally 240-140. [PK]   0751 CMP shows hyperkalemia, and elevated BUN and creatinine, also mild hyponatremia.  Troponin is normal.  BNP is mildly elevated.  TSH is normal.  Magnesium level is normal.  CBC is normal. [BB]   0752 . [BB]   0752 On maximum rate of Cardizem infusion patient's heart rate is still 125.  I will give her 0.5 of digoxin and 5 mg of Lopressor.   [BB]   0811 I have decided to admit patient and will discuss this with hospitalist on-call, Dr. Berry.   [BB]   0815 Chest x-ray by my interpretation shows no acute disease.  I discussed case with Internal Medicine physician, Dr. Berry who is on-call for hospitalist service.  Patient will be admitted under his care. [BB]      ED Course User Index  [BB] Ronal Ambrose MD  [PK] Petr Mclaughlin MD   Medical decision-making: Differential diagnosis includes SVT, atrial fibrillation,  ventricular tachycardia, STEMI, NSTEMI, congestive heart failure.  No further medical decision-making was performed on this patient.  Patient's outside clinic medical records were reviewed and I did not see any mention of atrial fibrillation although I did see mention of palpitations.       Clinical Impression:   Final diagnoses:  [R00.2] Palpitations  [I48.91] Atrial fibrillation with RVR (Primary)  [I48.92] Atrial flutter, unspecified type        ED Disposition Condition    Observation                 Ronal Ambrose MD  02/07/23 [id number]

## 2023-02-08 LAB
ANION GAP SERPL CALCULATED.3IONS-SCNC: 14 MMOL/L (ref 7–16)
BASOPHILS # BLD AUTO: 0.06 K/UL (ref 0–0.2)
BASOPHILS NFR BLD AUTO: 1 % (ref 0–1)
BSA FOR ECHO PROCEDURE: 2.51 M2
BUN SERPL-MCNC: 15 MG/DL (ref 7–18)
BUN/CREAT SERPL: 14 (ref 6–20)
CALCIUM SERPL-MCNC: 8.7 MG/DL (ref 8.5–10.1)
CHLORIDE SERPL-SCNC: 108 MMOL/L (ref 98–107)
CO2 SERPL-SCNC: 25 MMOL/L (ref 21–32)
CREAT SERPL-MCNC: 1.04 MG/DL (ref 0.55–1.02)
DIFFERENTIAL METHOD BLD: ABNORMAL
EGFR (NO RACE VARIABLE) (RUSH/TITUS): 61 ML/MIN/1.73M²
EJECTION FRACTION: 50 %
EOSINOPHIL # BLD AUTO: 0.27 K/UL (ref 0–0.5)
EOSINOPHIL NFR BLD AUTO: 4.5 % (ref 1–4)
ERYTHROCYTE [DISTWIDTH] IN BLOOD BY AUTOMATED COUNT: 14 % (ref 11.5–14.5)
GLUCOSE SERPL-MCNC: 90 MG/DL (ref 74–106)
HCT VFR BLD AUTO: 37.1 % (ref 38–47)
HGB BLD-MCNC: 11.6 G/DL (ref 12–16)
IMM GRANULOCYTES # BLD AUTO: 0.02 K/UL (ref 0–0.04)
IMM GRANULOCYTES NFR BLD: 0.3 % (ref 0–0.4)
LYMPHOCYTES # BLD AUTO: 1.26 K/UL (ref 1–4.8)
LYMPHOCYTES NFR BLD AUTO: 20.9 % (ref 27–41)
MAGNESIUM SERPL-MCNC: 2 MG/DL (ref 1.7–2.3)
MCH RBC QN AUTO: 29 PG (ref 27–31)
MCHC RBC AUTO-ENTMCNC: 31.3 G/DL (ref 32–36)
MCV RBC AUTO: 92.8 FL (ref 80–96)
MONOCYTES # BLD AUTO: 0.42 K/UL (ref 0–0.8)
MONOCYTES NFR BLD AUTO: 7 % (ref 2–6)
MPC BLD CALC-MCNC: 12.2 FL (ref 9.4–12.4)
NEUTROPHILS # BLD AUTO: 4 K/UL (ref 1.8–7.7)
NEUTROPHILS NFR BLD AUTO: 66.3 % (ref 53–65)
NRBC # BLD AUTO: 0 X10E3/UL
NRBC, AUTO (.00): 0 %
PLATELET # BLD AUTO: 234 K/UL (ref 150–400)
POTASSIUM SERPL-SCNC: 4.9 MMOL/L (ref 3.5–5.1)
RBC # BLD AUTO: 4 M/UL (ref 4.2–5.4)
SODIUM SERPL-SCNC: 142 MMOL/L (ref 136–145)
TROPONIN I SERPL HS-MCNC: 43 PG/ML
WBC # BLD AUTO: 6.03 K/UL (ref 4.5–11)

## 2023-02-08 PROCEDURE — 96372 THER/PROPH/DIAG INJ SC/IM: CPT | Performed by: REGISTERED NURSE

## 2023-02-08 PROCEDURE — 63600175 PHARM REV CODE 636 W HCPCS: Performed by: REGISTERED NURSE

## 2023-02-08 PROCEDURE — 27000190 HC CPAP FULL FACE MASK W/VALVE

## 2023-02-08 PROCEDURE — 94761 N-INVAS EAR/PLS OXIMETRY MLT: CPT

## 2023-02-08 PROCEDURE — 27000492 HC SLEEVE, SCD T/L

## 2023-02-08 PROCEDURE — 84484 ASSAY OF TROPONIN QUANT: CPT | Performed by: HOSPITALIST

## 2023-02-08 PROCEDURE — 92960 CARDIOVERSION ELECTRIC EXT: CPT | Performed by: STUDENT IN AN ORGANIZED HEALTH CARE EDUCATION/TRAINING PROGRAM

## 2023-02-08 PROCEDURE — 83735 ASSAY OF MAGNESIUM: CPT | Performed by: HOSPITALIST

## 2023-02-08 PROCEDURE — 85025 COMPLETE CBC W/AUTO DIFF WBC: CPT | Performed by: HOSPITALIST

## 2023-02-08 PROCEDURE — 99233 PR SUBSEQUENT HOSPITAL CARE,LEVL III: ICD-10-PCS | Mod: ,,, | Performed by: HOSPITALIST

## 2023-02-08 PROCEDURE — 99152 MOD SED SAME PHYS/QHP 5/>YRS: CPT | Mod: ,,, | Performed by: STUDENT IN AN ORGANIZED HEALTH CARE EDUCATION/TRAINING PROGRAM

## 2023-02-08 PROCEDURE — G0378 HOSPITAL OBSERVATION PER HR: HCPCS

## 2023-02-08 PROCEDURE — 99152 PR MOD CONSCIOUS SEDATION, SAME PHYS, 5+ YRS, FIRST 15 MIN: ICD-10-PCS | Mod: ,,, | Performed by: STUDENT IN AN ORGANIZED HEALTH CARE EDUCATION/TRAINING PROGRAM

## 2023-02-08 PROCEDURE — 99900031 HC PATIENT EDUCATION (STAT)

## 2023-02-08 PROCEDURE — 92960 CARDIOVERSION ELECTRIC EXT: CPT | Mod: ,,, | Performed by: STUDENT IN AN ORGANIZED HEALTH CARE EDUCATION/TRAINING PROGRAM

## 2023-02-08 PROCEDURE — 99233 SBSQ HOSP IP/OBS HIGH 50: CPT | Mod: ,,, | Performed by: HOSPITALIST

## 2023-02-08 PROCEDURE — 96366 THER/PROPH/DIAG IV INF ADDON: CPT

## 2023-02-08 PROCEDURE — 99153 MOD SED SAME PHYS/QHP EA: CPT | Performed by: STUDENT IN AN ORGANIZED HEALTH CARE EDUCATION/TRAINING PROGRAM

## 2023-02-08 PROCEDURE — 80048 BASIC METABOLIC PNL TOTAL CA: CPT | Performed by: HOSPITALIST

## 2023-02-08 PROCEDURE — 92960 PR CARDIOVERSION, ELECTIVE;EXTERN: ICD-10-PCS | Mod: ,,, | Performed by: STUDENT IN AN ORGANIZED HEALTH CARE EDUCATION/TRAINING PROGRAM

## 2023-02-08 PROCEDURE — 63600175 PHARM REV CODE 636 W HCPCS: Performed by: HOSPITALIST

## 2023-02-08 PROCEDURE — 99900035 HC TECH TIME PER 15 MIN (STAT)

## 2023-02-08 PROCEDURE — 99152 MOD SED SAME PHYS/QHP 5/>YRS: CPT | Performed by: STUDENT IN AN ORGANIZED HEALTH CARE EDUCATION/TRAINING PROGRAM

## 2023-02-08 PROCEDURE — 94660 CPAP INITIATION&MGMT: CPT

## 2023-02-08 PROCEDURE — 25000003 PHARM REV CODE 250: Performed by: HOSPITALIST

## 2023-02-08 PROCEDURE — 25000003 PHARM REV CODE 250: Performed by: STUDENT IN AN ORGANIZED HEALTH CARE EDUCATION/TRAINING PROGRAM

## 2023-02-08 PROCEDURE — 63600175 PHARM REV CODE 636 W HCPCS: Performed by: STUDENT IN AN ORGANIZED HEALTH CARE EDUCATION/TRAINING PROGRAM

## 2023-02-08 PROCEDURE — 27000221 HC OXYGEN, UP TO 24 HOURS

## 2023-02-08 RX ORDER — FENTANYL CITRATE 50 UG/ML
INJECTION, SOLUTION INTRAMUSCULAR; INTRAVENOUS
Status: DISCONTINUED | OUTPATIENT
Start: 2023-02-08 | End: 2023-02-08 | Stop reason: HOSPADM

## 2023-02-08 RX ORDER — HYDROMORPHONE HCL 2 MG/ML
VIAL (ML) INJECTION
Status: DISCONTINUED | OUTPATIENT
Start: 2023-02-08 | End: 2023-02-08 | Stop reason: HOSPADM

## 2023-02-08 RX ORDER — LIDOCAINE HYDROCHLORIDE 20 MG/ML
SOLUTION OROPHARYNGEAL
Status: DISCONTINUED | OUTPATIENT
Start: 2023-02-08 | End: 2023-02-08 | Stop reason: HOSPADM

## 2023-02-08 RX ORDER — MIDAZOLAM HYDROCHLORIDE 1 MG/ML
INJECTION INTRAMUSCULAR; INTRAVENOUS
Status: DISCONTINUED | OUTPATIENT
Start: 2023-02-08 | End: 2023-02-08 | Stop reason: HOSPADM

## 2023-02-08 RX ADMIN — SODIUM CHLORIDE, POTASSIUM CHLORIDE, SODIUM LACTATE AND CALCIUM CHLORIDE: 600; 310; 30; 20 INJECTION, SOLUTION INTRAVENOUS at 06:02

## 2023-02-08 RX ADMIN — ENOXAPARIN SODIUM 140 MG: 100 INJECTION SUBCUTANEOUS at 06:02

## 2023-02-08 RX ADMIN — TRAZODONE HYDROCHLORIDE 50 MG: 50 TABLET ORAL at 12:02

## 2023-02-08 RX ADMIN — DILTIAZEM HYDROCHLORIDE 10 MG/HR: 5 INJECTION INTRAVENOUS at 11:02

## 2023-02-08 NOTE — ASSESSMENT & PLAN NOTE
"History of PACs and palpitations for over 10 years and seen by Dr. Marcos April 2022.    Prior plan as detailed below:   "Echo and stress test were done in early 2020 which were both unremarkable.  EKG done yesterday and in office today shows very frequent PACs, atrial bigeminy  Patient is otherwise asymptomatic, denies any dizziness or syncope.  She does feel her heart quiver however denies any fast heart rates.  Will get a Holter monitor to assess her PAC burden.  At this point she does not have any other symptoms, which is continue giving her carvedilol.  If she becomes symptomatic can consider EP evaluation.  Holter monitor   Discontinue Eliquis, no evidence of atrial fibrillation."    "

## 2023-02-08 NOTE — PROGRESS NOTES
Brief PHILIPPE note:    No KASIE clot  Failled Cardioversion at 100J and 150 J.   Patient in atrial fibrillation now  Transition to IV amio gtt and load  Transition to PO eliquis 5mg bid  Consider outpatient EP evalution for flutter/fib ablation.     Sonu Smith  2/8/2023

## 2023-02-08 NOTE — ASSESSMENT & PLAN NOTE
Followed by dermatology and vein clinic in the past.    Eval for PFO on echo as this may increase risk of stroke particularly in patients with May-Thurner syndrome.

## 2023-02-08 NOTE — H&P
[facility] - Emergency Department  Hospital Medicine  History & Physical    Patient Name: Kat Nam  MRN: [id number]  Patient Class: OP- Observation  Admission Date: 2/7/2023  Attending Physician: George Berry MD   Primary Care Provider: Bony Mary MD         Patient information was obtained from patient, past medical records and ER records.     Subjective:     Principal Problem:Tachyarrhythmia    Chief Complaint:   Chief Complaint   Patient presents with    Palpitations     Pt works here in lab - started feeling bubbling in chest - pt obese        HPI:   Ms. Nam is a 63yo woman h/o asthma, GERD, HTN, HLD, Class 3 obesity, HENRY on CPAP, premature atrial contractions with bigeminy, May-Thurner syndrome who presents with progressively worsening palpitations and shortness of breath.  She describes the palpitations or chest as feeling like bubbles.  She states that the bubbles have been more frequent over the last few days over the last 3 weeks.      She states that she works 7 on 7 off during the night shift in the [employer] and this alters her sleep cycle.  She thought that these palpitations were relatively normal given her abnormal sleep patterns.  Furthermore she is had palpitations in the past.  However she now has shortness of breath so this has become more concerning for her.  She denies any chest pain.  She notes that she intermittently has lower extremity swelling but she has May Thurner syndrome and she has had discolorations and intermittent swelling of her lower extremities.  She states she was seen by Dr. Marcos for PACs and there were no changes in her management.        ER course:   Patient presented with pulse of 245 and blood pressure 238/59.  She was given multiple medications as detailed below.  She experienced a pause after having adenosine but did not reveal the underlying rhythm.  There were flutter waves present.    Initial Vitals   BP Pulse Resp Temp  "SpO2   02/07/23 0444 02/07/23 0442 02/07/23 0442 02/07/23 0442 02/07/23 0442   (!) 238/59 (!) 245 (!) 26 97.8 °F (36.6 °C) 98      Medications   diltiaZEM (CARDIZEM) 125 mg in dextrose 5 % (D5W) 125 mL infusion (15 mg/hr Intravenous Rate/Dose Change 2/7/23 0709)   digoxin injection 500 mcg (has no administration in time range)   metoprolol injection 5 mg (has no administration in time range)   metoprolol injection 5 mg (5 mg Intravenous Given 2/7/23 0530)   sodium chloride 0.9% bolus 1,000 mL 1,000 mL (0 mLs Intravenous Stopped 2/7/23 0728)   adenosine injection 12 mg (12 mg Intravenous Given 2/7/23 0530)   adenosine injection 6 mg (6 mg Intravenous Given 2/7/23 0530)   metoprolol injection 5 mg (5 mg Intravenous Given 2/7/23 0531)       HPI per cardiology team:     Pt is a 62 y.o.f. with hx of asthma (reports daily symbicort use), GERD, hypertension, hyperlipidemia, obesity, sleep apnea (compliant with nightly CPAP) and May-Thurner Syndrome presenting with palpitations. Pt's daughter and  are in the room at the time of my exam. Pt works as an employee with the [employer] and felt a sensation which she describes as like having "bubbles" in her chest last night around 0400. 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 Pt has been experiencing similar episodes of a "bubble" sensation in her chest for the last few weeks. Each episode has lasted approx. 10-15 minutes and is accompanied by weakness. Pt reports some SOB, diaphoresis, and a sensation of near syncope but denies chest pain, visual disturbances, fevers, chills and nausea.      Of note, Pt was seen by Dr. Marcos in April of last year. Pt was found to have a frequent PAC burden but no evidence of Afib. Echo and stress test which were performed in early 2020 were both documented to be unremarkable.    Workup thus far is notable for the following: CXR reveals no acute " cardiopulmonary process. Latest EKG reveals atrial flutter with a rate of 124. Troponins were negative for acute ischemia x 2. NT-proBNP was 1447.  Potassium was mildly elevated at 5.2. Magnesium was wnl. TSH was wnl. H/H were wnl.  In the ED, Pt was initially treated with 6mg then 12mg of adenosine with brief approx. 10 second conversion to sinus rhythm. Her HR then increased back to 245 bpm. Pt was subsequently treated with IV Lopressor as well as Cardizem infusion and 1 dose of digoxin with successful reduction of her rate.     Cardiology has been consulted for evaluation and management of A-flutter.          Past Medical History:   Diagnosis Date    Asthma     Bilateral leg pain     Chronic low back pain     Compression of vein     Iliac vein compression syndrome    Digestive disorder     Edema of lower extremity     Gastroesophageal reflux disease     History of basal cell carcinoma (BCC) of skin 2022    Hx of phlebitis     Hx of thrombophlebitis     Hyperlipidemia     Hypertension     Lymphedema, not elsewhere classified     Morbid obesity     Other skin changes     Peripheral edema     Peripheral vascular disease, unspecified     Postartificial menopausal syndrome     Premature atrial contraction     Sleep apnea        Past Surgical History:   Procedure Laterality Date    ARTHROSCOPY OF KNEE Left 2021    Procedure: ARTHROSCOPY, KNEE;  Surgeon: Noman Huerta MD;  Location: [facility];  Service: Orthopedics;  Laterality: Left;    BASAL CELL CARCINOMA EXCISION  2022    left eyebrow (Mohs w/ Dr. Garcia)     SECTION      CHOLECYSTECTOMY      EXCISION OF MEDIAL MENISCUS OF KNEE Left 2021    Procedure: MENISCECTOMY, KNEE, MEDIAL;  Surgeon: Noman Huerta MD;  Location: [facility];  Service: Orthopedics;  Laterality: Left;    HYSTERECTOMY      KNEE ARTHROSCOPY W/ MENISCECTOMY Left 2021    Procedure: ARTHROSCOPY, KNEE, WITH MENISCECTOMY;   Surgeon: Noman Huerta MD;  Location: [facility];  Service: Orthopedics;  Laterality: Left;  LATERAL MENISECTOMY    SPINE SURGERY  1998    Lumbar Laminectomy, L4-L5       Review of patient's allergies indicates:   Allergen Reactions    Poppy Swelling    Aldomet amanda hcl injection      Headache       No current facility-administered medications on file prior to encounter.     Current Outpatient Medications on File Prior to Encounter   Medication Sig    albuterol (PROVENTIL/VENTOLIN HFA) 90 mcg/actuation inhaler Inhale 2 puffs into the lungs every 4 (four) hours as needed for Wheezing. Rescue    carvediloL (COREG) 12.5 MG tablet TAKE ONE (1) TABLET BY MOUTH TWO (2) (TWO) TIMES DAILY WITH MEALS.    cloNIDine (CATAPRES) 0.1 MG tablet Take 1 tablet (0.1 mg total) by mouth once. for 1 dose    furosemide (LASIX) 20 MG tablet Take 1 tablet (20 mg total) by mouth once daily.    olmesartan (BENICAR) 40 MG tablet Take 1 tablet (40 mg total) by mouth once daily.    ondansetron (ZOFRAN) 4 MG tablet Take 2 tablets (8 mg total) by mouth 2 (two) times daily.    pantoprazole (PROTONIX) 40 MG tablet Take 1 tablet (40 mg total) by mouth once daily.    SYMBICORT 160-4.5 mcg/actuation HFAA Inhale 4 puffs into the lungs every 12 (twelve) hours.     Family History       Problem Relation (Age of Onset)    Diabetes Mellitus Mother, Sister, Brother    Heart disease Father, Brother    Hypertension Father          Tobacco Use    Smoking status: Never    Smokeless tobacco: Never   Substance and Sexual Activity    Alcohol use: Never    Drug use: Never    Sexual activity: Not on file     Review of Systems   Constitutional: Positive for diaphoresis and malaise/fatigue. Negative for chills, fever and night sweats.   Cardiovascular:  Positive for irregular heartbeat, leg swelling (chronic) and palpitations. Negative for dyspnea on exertion, near-syncope, orthopnea, paroxysmal nocturnal dyspnea and syncope.   Respiratory:   Positive for shortness of breath. Negative for cough.    Gastrointestinal:  Negative for nausea and vomiting.   Neurological:  Positive for light-headedness. Negative for headaches and weakness.   Objective:     Vital Signs (Most Recent):  Temp: 97.8 °F (36.6 °C) (02/07/23 0442)  Pulse: 107 (02/07/23 1442)  Resp: 20 (02/07/23 1442)  BP: 123/69 (02/07/23 1442)  SpO2: 96 % (02/07/23 1442) Vital Signs (24h Range):  Temp:  [97.8 °F (36.6 °C)] 97.8 °F (36.6 °C)  Pulse:  [107-246] 107  Resp:  [12-26] 20  SpO2:  [96 %-100 %] 96 %  BP: (123-238)/() 123/69     Weight: 135.6 kg (299 lb)  Body mass index is 48.26 kg/m².    SpO2: 96 %         Intake/Output Summary (Last 24 hours) at 2/7/2023 1641  Last data filed at 2/7/2023 0728  Gross per 24 hour   Intake 1000 ml   Output --   Net 1000 ml       Lines/Drains/Airways       Peripheral Intravenous Line  Duration                  Peripheral IV - Single Lumen 02/07/23 0639 20 G Left Antecubital <1 day                    Physical Exam  Vitals reviewed.   Constitutional:       General: She is not in acute distress.     Appearance: She is obese. She is not ill-appearing, toxic-appearing or diaphoretic.      Comments: Pt is morbidly obese    HENT:      Head: Normocephalic and atraumatic.      Mouth/Throat:      Mouth: Mucous membranes are moist.      Pharynx: No oropharyngeal exudate or posterior oropharyngeal erythema.   Eyes:      General: No scleral icterus.        Right eye: No discharge.         Left eye: No discharge.   Cardiovascular:      Rate and Rhythm: Tachycardia present. Rhythm irregular.      Heart sounds: No murmur heard.    No friction rub. No gallop.   Pulmonary:      Effort: Pulmonary effort is normal. No respiratory distress.      Breath sounds: No wheezing, rhonchi or rales.   Abdominal:      General: There is no distension.      Tenderness: There is no abdominal tenderness. There is no guarding.   Musculoskeletal:         General: Swelling present.      Right  lower leg: Edema present.      Left lower leg: Edema present.   Skin:     General: Skin is warm and dry.   Neurological:      General: No focal deficit present.      Mental Status: She is alert and oriented to person, place, and time.       Significant Labs:   CMP  Sodium   Date Value Ref Range Status   02/07/2023 135 (L) 136 - 145 mmol/L Final     Potassium   Date Value Ref Range Status   02/07/2023 5.2 (H) 3.5 - 5.1 mmol/L Final     Chloride   Date Value Ref Range Status   02/07/2023 100 98 - 107 mmol/L Final     CO2   Date Value Ref Range Status   02/07/2023 22 21 - 32 mmol/L Final     Glucose   Date Value Ref Range Status   02/07/2023 124 (H) 74 - 106 mg/dL Final     BUN   Date Value Ref Range Status   02/07/2023 21 (H) 7 - 18 mg/dL Final     Creatinine   Date Value Ref Range Status   02/07/2023 1.50 (H) 0.55 - 1.02 mg/dL Final     Calcium   Date Value Ref Range Status   02/07/2023 9.6 8.5 - 10.1 mg/dL Final     Total Protein   Date Value Ref Range Status   02/07/2023 7.9 6.4 - 8.2 g/dL Final     Albumin   Date Value Ref Range Status   02/07/2023 4.6 3.5 - 5.0 g/dL Final     Bilirubin, Total   Date Value Ref Range Status   02/07/2023 0.7 >0.0 - 1.2 mg/dL Final     Alk Phos   Date Value Ref Range Status   02/07/2023 98 50 - 130 U/L Final     AST   Date Value Ref Range Status   02/07/2023 18 15 - 37 U/L Final     ALT   Date Value Ref Range Status   02/07/2023 21 13 - 56 U/L Final     Anion Gap   Date Value Ref Range Status   02/07/2023 18 (H) 7 - 16 mmol/L Final     eGFR   Date Value Ref Range Status   02/07/2023 39 (L) >=60 mL/min/1.73m² Final     Lab Results   Component Value Date    WBC 10.57 02/07/2023    HGB 13.9 02/07/2023    HCT 44.3 02/07/2023    MCV 92.7 02/07/2023     (H) 02/07/2023             Significant Imaging: X-Ray: CXR: X-Ray Chest 1 View (CXR):   Results for orders placed or performed during the hospital encounter of 02/07/23   X-Ray Chest 1 View    Narrative    EXAMINATION:  XR CHEST 1  "VIEW    CLINICAL HISTORY:  Palpitations    COMPARISON:  7 February 2020    TECHNIQUE:  XR CHEST 1 VIEW    FINDINGS:  The heart and mediastinum are normal in size and configuration.  The pulmonary vascularity is normal in caliber.  No lung infiltrates, effusions, pneumothorax or other abnormality is demonstrated.      Impression    No evidence of acute cardiopulmonary disease.      Electronically signed by: Francesco Eagle  Date:    02/07/2023  Time:    07:50     Assessment/Plan:     * Tachyarrhythmia  Suspect atrial flutter was the original rhythm.    Multiple medications were given and patient finally controlled with diltiazem drip at 15 and digoxin.  TSH normal.  Obesity and sleep apnea or risk factors as well as asthma medications.  Cardiology consulted given difficulty controlling initially and new onset.    Therapeutic Lovenox per recommendation of Cardiology.  Continue diltiazem drip.  Note that she was loaded with digoxin and may continue this tomorrow in consultation with Cardiology.      PAC (premature atrial contraction)  History of PACs and palpitations for over 10 years and seen by Dr. Marcos April 2022.    Prior plan as detailed below:   "Echo and stress test were done in early 2020 which were both unremarkable.  EKG done yesterday and in office today shows very frequent PACs, atrial bigeminy  Patient is otherwise asymptomatic, denies any dizziness or syncope.  She does feel her heart quiver however denies any fast heart rates.  Will get a Holter monitor to assess her PAC burden.  At this point she does not have any other symptoms, which is continue giving her carvedilol.  If she becomes symptomatic can consider EP evaluation.  Holter monitor   Discontinue Eliquis, no evidence of atrial fibrillation."      HENRY (obstructive sleep apnea)  On home C Pap and will continue this while in hospital.  Patient reports compliance with CPAP.      HTN (hypertension)  Continue home antihypertensives in consultation " with Cardiology.      AMEE (acute kidney injury)  Patient with acute kidney injury likely due to IVVD/dehydration AMEE is currently stable. Labs reviewed- Renal function/electrolytes with Estimated Creatinine Clearance: 55.1 mL/min (A) (based on SCr of 1.5 mg/dL (H)). according to latest data. Monitor urine output and serial BMP and adjust therapy as needed. Avoid nephrotoxins and renally dose meds for GFR listed above.     AMEE of unclear etiology but may be related to patient's cardiac arrhythmia.  Will continue to monitor tomorrow.  Hydration p.r.n..    Severe obesity (BMI >= 40)  Body mass index is 48.26 kg/m². Morbid obesity complicates all aspects of disease management from diagnostic modalities to treatment. Weight loss encouraged and health benefits explained to patient.           VTE Risk Mitigation (From admission, onward)         Ordered     enoxaparin injection 140 mg  Every 12 hours (non-standard times)         02/07/23 1658     Reason for No Pharmacological VTE Prophylaxis  Once        Question:  Reasons:  Answer:  Already adequately anticoagulated on oral Anticoagulants    02/07/23 0831     Place sequential compression device  Until discontinued         02/07/23 0831     IP VTE HIGH RISK PATIENT  Once         02/07/23 0831                   George Berry MD  Department of Hospital Medicine   [facility] - Emergency Department

## 2023-02-08 NOTE — ASSESSMENT & PLAN NOTE
Body mass index is 48.26 kg/m². Morbid obesity complicates all aspects of disease management from diagnostic modalities to treatment. Weight loss encouraged and health benefits explained to patient.

## 2023-02-08 NOTE — PLAN OF CARE
[facility] - Short Stay Unit  Initial Discharge Assessment       Primary Care Provider: Bony Mary MD    Admission Diagnosis: Atrial flutter [I48.92]  Palpitations [R00.2]  A-fib [I48.91]  Atrial fibrillation with RVR [I48.91]  Atrial flutter, unspecified type [I48.92]    Admission Date: 2/7/2023  Expected Discharge Date:     Discharge Barriers Identified: None    Payor: [employer]EFIT / Plan: [facility] [employer] [employer] [place] / Product Type: Self Funded /     Extended Emergency Contact Information  Primary Emergency Contact: Radha Ortega  Mobile Phone: [phone]  Relation: Daughter  Preferred language: English   needed? No    Discharge Plan A: Home with family  Discharge Plan B: Home with family      [facility] [place] [address] [address] [address]  Phone: [phone] Fax: [phone]      Initial Assessment (most recent)       Adult Discharge Assessment - 02/08/23 1454          Discharge Assessment    Assessment Type Discharge Planning Assessment     Source of Information patient     People in Home spouse     Do you expect to return to your current living situation? Yes     Do you have help at home or someone to help you manage your care at home? Yes     Who are your caregiver(s) and their phone number(s)? Low Nam () [phone]     Prior to hospitilization cognitive status: Alert/Oriented     Home Accessibility wheelchair accessible     Home Layout Able to live on 1st floor     Equipment Currently Used at Home CPAP     Readmission within 30 days? No     Patient currently being followed by outpatient case management? No     Do you currently have service(s) that help you manage your care at home? No     Do you take prescription medications? Yes     Do you have prescription coverage? Yes     Do you have any problems affording any of your prescribed medications? No     Who is going to help you get home at  discharge? Low Nam () [phone]     How do you get to doctors appointments? car, drives self;family or friend will provide     Are you on dialysis? No     Do you take coumadin? No     Discharge Plan A Home with family     Discharge Plan B Home with family     Discharge Plan discussed with: Patient;Adult children     Discharge Barriers Identified None        Physical Activity    On average, how many days per week do you engage in moderate to strenuous exercise (like a brisk walk)? 3 days     On average, how many minutes do you engage in exercise at this level? 20 min        Financial Resource Strain    How hard is it for you to pay for the very basics like food, housing, medical care, and heating? Not hard at all        Housing Stability    In the last 12 months, was there a time when you were not able to pay the mortgage or rent on time? No     In the last 12 months, how many places have you lived? 1     In the last 12 months, was there a time when you did not have a steady place to sleep or slept in a shelter (including now)? No        Transportation Needs    In the past 12 months, has lack of transportation kept you from medical appointments or from getting medications? No     In the past 12 months, has lack of transportation kept you from meetings, work, or from getting things needed for daily living? No        Food Insecurity    Within the past 12 months, you worried that your food would run out before you got the money to buy more. Never true     Within the past 12 months, the food you bought just didn't last and you didn't have money to get more. Never true        Stress    Do you feel stress - tense, restless, nervous, or anxious, or unable to sleep at night because your mind is troubled all the time - these days? Very much        Social Connections    In a typical week, how many times do you talk on the phone with family, friends, or neighbors? More than three times a week     How often do  you get together with friends or relatives? Twice a week     How often do you attend Zoroastrianism or Advent services? Never     Do you belong to any clubs or organizations such as Zoroastrianism groups, unions, fraternal or athletic groups, or school groups? No     How often do you attend meetings of the clubs or organizations you belong to? Never     Are you , , , , never , or living with a partner?         Alcohol Use    Q1: How often do you have a drink containing alcohol? Never     Q2: How many drinks containing alcohol do you have on a typical day when you are drinking? Patient does not drink     Q3: How often do you have six or more drinks on one occasion? Never        OTHER    Name(s) of People in Home Low Nam () [phone]                      Pt lives home with Low Nam () [phone]. No hh. Has cpap. Dcp is tentatively home with family. SW spoke with MD. Cards is following. Will wait for recommendations.

## 2023-02-08 NOTE — NURSING
Admitted from ED via stretcher at 2320. VS stable. Afebrile.Cardiazem remains at 15ml/hr.. Accompanied by . NAD

## 2023-02-08 NOTE — ASSESSMENT & PLAN NOTE
Suspect atrial flutter was the original rhythm.    Multiple medications were given and patient finally controlled with diltiazem drip at 15 and digoxin.  TSH normal.  Obesity and sleep apnea or risk factors as well as asthma medications.  Cardiology consulted given difficulty controlling initially and new onset.    Therapeutic Lovenox per recommendation of Cardiology.  Continue diltiazem drip.  Note that she was loaded with digoxin and may continue this tomorrow in consultation with Cardiology.

## 2023-02-08 NOTE — ASSESSMENT & PLAN NOTE
Patient with acute kidney injury likely due to IVVD/dehydration AMEE is currently stable. Labs reviewed- Renal function/electrolytes with Estimated Creatinine Clearance: 55.1 mL/min (A) (based on SCr of 1.5 mg/dL (H)). according to latest data. Monitor urine output and serial BMP and adjust therapy as needed. Avoid nephrotoxins and renally dose meds for GFR listed above.     AMEE of unclear etiology but may be related to patient's cardiac arrhythmia.  Will continue to monitor tomorrow.  Hydration p.r.n..

## 2023-02-09 PROBLEM — I48.19 PERSISTENT ATRIAL FIBRILLATION WITH RAPID VENTRICULAR RESPONSE: Status: ACTIVE | Noted: 2023-02-09

## 2023-02-09 PROBLEM — I48.0 PAROXYSMAL ATRIAL FIBRILLATION: Status: ACTIVE | Noted: 2023-02-09

## 2023-02-09 LAB
ANION GAP SERPL CALCULATED.3IONS-SCNC: 11 MMOL/L (ref 7–16)
AORTIC VALVE CUSP SEPERATION: 2.53 CM
AV INDEX (PROSTH): 0.9
AV MEAN GRADIENT: 4 MMHG
AV PEAK GRADIENT: 7 MMHG
AV VALVE AREA: 3.49 CM2
BASOPHILS # BLD AUTO: 0.04 K/UL (ref 0–0.2)
BASOPHILS NFR BLD AUTO: 0.8 % (ref 0–1)
BSA FOR ECHO PROCEDURE: 2.51 M2
BUN SERPL-MCNC: 13 MG/DL (ref 7–18)
BUN/CREAT SERPL: 13 (ref 6–20)
CALCIUM SERPL-MCNC: 8.8 MG/DL (ref 8.5–10.1)
CHLORIDE SERPL-SCNC: 106 MMOL/L (ref 98–107)
CO2 SERPL-SCNC: 26 MMOL/L (ref 21–32)
CREAT SERPL-MCNC: 1.03 MG/DL (ref 0.55–1.02)
CV ECHO LV RWT: 0.58 CM
DIFFERENTIAL METHOD BLD: ABNORMAL
DOP CALC AO PEAK VEL: 1.3 M/S
DOP CALC AO VTI: 21.87 CM
DOP CALC LVOT AREA: 3.9 CM2
DOP CALC LVOT DIAMETER: 2.22 CM
DOP CALC LVOT STROKE VOLUME: 76.33 CM3
DOP CALCLVOT PEAK VEL VTI: 19.73 CM
E WAVE DECELERATION TIME: 121 MSEC
ECHO LV POSTERIOR WALL: 1 CM (ref 0.6–1.1)
EGFR (NO RACE VARIABLE) (RUSH/TITUS): 62 ML/MIN/1.73M²
EJECTION FRACTION: 55 %
EOSINOPHIL # BLD AUTO: 0.22 K/UL (ref 0–0.5)
EOSINOPHIL NFR BLD AUTO: 4.6 % (ref 1–4)
ERYTHROCYTE [DISTWIDTH] IN BLOOD BY AUTOMATED COUNT: 14 % (ref 11.5–14.5)
FRACTIONAL SHORTENING: 49 % (ref 28–44)
GLUCOSE SERPL-MCNC: 96 MG/DL (ref 74–106)
HCT VFR BLD AUTO: 37.9 % (ref 38–47)
HGB BLD-MCNC: 11.8 G/DL (ref 12–16)
IMM GRANULOCYTES # BLD AUTO: 0.01 K/UL (ref 0–0.04)
IMM GRANULOCYTES NFR BLD: 0.2 % (ref 0–0.4)
INTERVENTRICULAR SEPTUM: 0.99 CM (ref 0.6–1.1)
LEFT ATRIUM SIZE: 3.18 CM
LEFT INTERNAL DIMENSION IN SYSTOLE: 1.76 CM (ref 2.1–4)
LEFT VENTRICLE DIASTOLIC VOLUME INDEX: 20.3 ML/M2
LEFT VENTRICLE DIASTOLIC VOLUME: 48.11 ML
LEFT VENTRICLE MASS INDEX: 42 G/M2
LEFT VENTRICLE SYSTOLIC VOLUME INDEX: 3.9 ML/M2
LEFT VENTRICLE SYSTOLIC VOLUME: 9.17 ML
LEFT VENTRICULAR INTERNAL DIMENSION IN DIASTOLE: 3.42 CM (ref 3.5–6)
LEFT VENTRICULAR MASS: 99.06 G
LYMPHOCYTES # BLD AUTO: 1.04 K/UL (ref 1–4.8)
LYMPHOCYTES NFR BLD AUTO: 21.5 % (ref 27–41)
MAGNESIUM SERPL-MCNC: 2 MG/DL (ref 1.7–2.3)
MCH RBC QN AUTO: 28.9 PG (ref 27–31)
MCHC RBC AUTO-ENTMCNC: 31.1 G/DL (ref 32–36)
MCV RBC AUTO: 92.9 FL (ref 80–96)
MONOCYTES # BLD AUTO: 0.34 K/UL (ref 0–0.8)
MONOCYTES NFR BLD AUTO: 7 % (ref 2–6)
MPC BLD CALC-MCNC: 11.7 FL (ref 9.4–12.4)
MV PEAK E VEL: 1.14 M/S
NEUTROPHILS # BLD AUTO: 3.18 K/UL (ref 1.8–7.7)
NEUTROPHILS NFR BLD AUTO: 65.9 % (ref 53–65)
NRBC # BLD AUTO: 0 X10E3/UL
NRBC, AUTO (.00): 0 %
PISA TR MAX VEL: 2.29 M/S
PLATELET # BLD AUTO: 206 K/UL (ref 150–400)
POTASSIUM SERPL-SCNC: 4.3 MMOL/L (ref 3.5–5.1)
RBC # BLD AUTO: 4.08 M/UL (ref 4.2–5.4)
SODIUM SERPL-SCNC: 139 MMOL/L (ref 136–145)
TR MAX PG: 21 MMHG
WBC # BLD AUTO: 4.83 K/UL (ref 4.5–11)

## 2023-02-09 PROCEDURE — 25000003 PHARM REV CODE 250: Performed by: HOSPITALIST

## 2023-02-09 PROCEDURE — 93010 ELECTROCARDIOGRAM REPORT: CPT | Mod: ,,, | Performed by: INTERNAL MEDICINE

## 2023-02-09 PROCEDURE — 96366 THER/PROPH/DIAG IV INF ADDON: CPT

## 2023-02-09 PROCEDURE — 85025 COMPLETE CBC W/AUTO DIFF WBC: CPT | Performed by: HOSPITALIST

## 2023-02-09 PROCEDURE — 99233 PR SUBSEQUENT HOSPITAL CARE,LEVL III: ICD-10-PCS | Mod: ,,, | Performed by: HOSPITALIST

## 2023-02-09 PROCEDURE — 99233 SBSQ HOSP IP/OBS HIGH 50: CPT | Mod: ,,, | Performed by: HOSPITALIST

## 2023-02-09 PROCEDURE — 63600175 PHARM REV CODE 636 W HCPCS: Performed by: REGISTERED NURSE

## 2023-02-09 PROCEDURE — 93010 EKG 12-LEAD: ICD-10-PCS | Mod: ,,, | Performed by: INTERNAL MEDICINE

## 2023-02-09 PROCEDURE — 99233 PR SUBSEQUENT HOSPITAL CARE,LEVL III: ICD-10-PCS | Mod: ,,, | Performed by: INTERNAL MEDICINE

## 2023-02-09 PROCEDURE — 93005 ELECTROCARDIOGRAM TRACING: CPT

## 2023-02-09 PROCEDURE — 63600175 PHARM REV CODE 636 W HCPCS: Performed by: NURSE PRACTITIONER

## 2023-02-09 PROCEDURE — 96372 THER/PROPH/DIAG INJ SC/IM: CPT | Performed by: REGISTERED NURSE

## 2023-02-09 PROCEDURE — 99233 SBSQ HOSP IP/OBS HIGH 50: CPT | Mod: ,,, | Performed by: INTERNAL MEDICINE

## 2023-02-09 PROCEDURE — 83735 ASSAY OF MAGNESIUM: CPT | Performed by: HOSPITALIST

## 2023-02-09 PROCEDURE — 94761 N-INVAS EAR/PLS OXIMETRY MLT: CPT

## 2023-02-09 PROCEDURE — 80048 BASIC METABOLIC PNL TOTAL CA: CPT | Performed by: HOSPITALIST

## 2023-02-09 PROCEDURE — 27000221 HC OXYGEN, UP TO 24 HOURS

## 2023-02-09 PROCEDURE — 99900035 HC TECH TIME PER 15 MIN (STAT)

## 2023-02-09 PROCEDURE — G0378 HOSPITAL OBSERVATION PER HR: HCPCS

## 2023-02-09 PROCEDURE — 25000003 PHARM REV CODE 250: Performed by: NURSE PRACTITIONER

## 2023-02-09 PROCEDURE — 96365 THER/PROPH/DIAG IV INF INIT: CPT | Mod: 59

## 2023-02-09 PROCEDURE — 25000003 PHARM REV CODE 250: Performed by: REGISTERED NURSE

## 2023-02-09 PROCEDURE — 96376 TX/PRO/DX INJ SAME DRUG ADON: CPT

## 2023-02-09 RX ORDER — AMIODARONE HYDROCHLORIDE 200 MG/1
400 TABLET ORAL 2 TIMES DAILY
Status: DISCONTINUED | OUTPATIENT
Start: 2023-02-10 | End: 2023-02-11 | Stop reason: HOSPADM

## 2023-02-09 RX ORDER — PANTOPRAZOLE SODIUM 40 MG/1
40 TABLET, DELAYED RELEASE ORAL DAILY
Status: DISCONTINUED | OUTPATIENT
Start: 2023-02-09 | End: 2023-02-11 | Stop reason: HOSPADM

## 2023-02-09 RX ORDER — DILTIAZEM HYDROCHLORIDE 240 MG/1
240 CAPSULE, COATED, EXTENDED RELEASE ORAL DAILY
Status: DISCONTINUED | OUTPATIENT
Start: 2023-02-09 | End: 2023-02-10

## 2023-02-09 RX ADMIN — AMIODARONE HYDROCHLORIDE 1 MG/MIN: 50 INJECTION, SOLUTION INTRAVENOUS at 11:02

## 2023-02-09 RX ADMIN — APIXABAN 5 MG: 5 TABLET, FILM COATED ORAL at 08:02

## 2023-02-09 RX ADMIN — DEXTROSE MONOHYDRATE 150 MG: 50 INJECTION, SOLUTION INTRAVENOUS at 11:02

## 2023-02-09 RX ADMIN — TRAZODONE HYDROCHLORIDE 50 MG: 50 TABLET ORAL at 08:02

## 2023-02-09 RX ADMIN — DILTIAZEM HYDROCHLORIDE 240 MG: 240 CAPSULE, COATED, EXTENDED RELEASE ORAL at 10:02

## 2023-02-09 RX ADMIN — ENOXAPARIN SODIUM 140 MG: 100 INJECTION SUBCUTANEOUS at 05:02

## 2023-02-09 RX ADMIN — PANTOPRAZOLE SODIUM 40 MG: 40 TABLET, DELAYED RELEASE ORAL at 10:02

## 2023-02-09 NOTE — ASSESSMENT & PLAN NOTE
May be 2/2 to dehydration as well as hypoperfusion 2/2 to tachyarrhythmia  IVF   Will continue to monitor BMP   2/9/23: sCre 1.03 - improved

## 2023-02-09 NOTE — PROGRESS NOTES
[facility] - Short Stay Unit  Cardiology  Progress Note    Patient Name: Kat Nam  MRN: [id number]  Admission Date: 2/7/2023  Hospital Length of Stay: 0 days  Code Status: Full Code   Attending Physician: George Berry MD   Primary Care Physician: Bony Mary MD  Expected Discharge Date:   Principal Problem:Tachyarrhythmia    Subjective:     Hospital Course:   No notes on file    Interval History: NAEO. Pt is failed cardioversion x 2. Hr continues to run tachycardic with exertion    Review of Systems   Constitutional: Positive for malaise/fatigue. Negative for chills.   Eyes:  Negative for visual disturbance.   Cardiovascular:  Positive for irregular heartbeat, leg swelling (chronic) and palpitations. Negative for paroxysmal nocturnal dyspnea and syncope.   Respiratory:  Positive for shortness of breath.    Gastrointestinal:  Negative for abdominal pain, constipation, nausea and vomiting.   Objective:     Vital Signs (Most Recent):  Temp: 98.3 °F (36.8 °C) (02/09/23 0754)  Pulse: 86 (02/09/23 0754)  Resp: 19 (02/09/23 0754)  BP: (!) 154/90 (02/09/23 1030)  SpO2: (!) 90 % (02/09/23 0754)   Vital Signs (24h Range):  Temp:  [97.6 °F (36.4 °C)-98.3 °F (36.8 °C)] 98.3 °F (36.8 °C)  Pulse:  [61-96] 86  Resp:  [18-19] 19  SpO2:  [90 %-98 %] 90 %  BP: (124-154)/(55-90) 154/90     Weight: 135.6 kg (299 lb)  Body mass index is 48.26 kg/m².     SpO2: (!) 90 %       No intake or output data in the 24 hours ending 02/09/23 1247    Lines/Drains/Airways       Peripheral Intravenous Line  Duration                  Peripheral IV - Single Lumen 02/09/23 1134 22 G Anterior;Left Forearm <1 day                    Physical Exam  Vitals reviewed.   Constitutional:       General: She is not in acute distress.     Appearance: She is obese. She is not ill-appearing, toxic-appearing or diaphoretic.   HENT:      Head: Normocephalic and atraumatic.      Mouth/Throat:      Mouth: Mucous membranes are moist.  "  Eyes:      General: No scleral icterus.        Right eye: No discharge.      Extraocular Movements: Extraocular movements intact.      Pupils: Pupils are equal, round, and reactive to light.   Cardiovascular:      Rate and Rhythm: Tachycardia present. Rhythm irregular.      Pulses: Normal pulses.      Heart sounds: No murmur heard.    Gallop present.   Pulmonary:      Effort: No respiratory distress.      Breath sounds: Normal breath sounds. No stridor. No wheezing, rhonchi or rales.   Abdominal:      General: Bowel sounds are normal.      Palpations: Abdomen is soft.      Tenderness: There is no abdominal tenderness. There is no guarding or rebound.   Musculoskeletal:         General: Swelling present.      Cervical back: Normal range of motion.      Right lower leg: Edema present.      Left lower leg: Edema present.   Skin:     General: Skin is warm and dry.      Capillary Refill: Capillary refill takes less than 2 seconds.      Coloration: Skin is not jaundiced.   Neurological:      General: No focal deficit present.      Mental Status: She is alert and oriented to person, place, and time.      Cranial Nerves: No cranial nerve deficit.      Sensory: No sensory deficit.   Psychiatric:         Mood and Affect: Mood normal.         Behavior: Behavior normal.       Significant Labs: All pertinent lab results from the last 24 hours have been reviewed.    Significant Imaging: EKG:      Assessment and Plan:     Brief HPI: 62 y.o.f. with hx of asthma (reports daily symbicort use), GERD, hypertension, hyperlipidemia, obesity, sleep apnea (compliant with nightly CPAP) and May-Thurner Syndrome presenting with palpitations. Pt's daughter and  are in the room at the time of my exam. Pt works as an employee with the [employer] and felt a sensation which she describes as like having "bubbles" in her chest last night around 0400. 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 Pt has been experiencing similar episodes of a "bubble" sensation in her chest for the last few weeks. Each episode has lasted approx. 10-15 minutes and is accompanied by fatigue. Pt reports some SOB, diaphoresis, and a sensation of near syncope but denies chest pain, visual disturbances, fevers, chills and nausea.       Of note, Pt was seen by Dr. Marcos in April of last year. Pt was found to have a frequent PAC burden but no evidence of Afib. Echo and stress test which were performed in early 2020 were both documented to be unremarkable.       * Tachyarrhythmia  Suspect obesity coupled with dehydration and frequent beta agonist use as contributing factors   Currently rate controlled with cardizem infusion   Anticoagulation with Lovenox 1mg/kg q12h  ECHO ordered; results pending    D-dimer ordered for possible VTE   Emphasized compliance with CPAP to Pt   Will monitor telemetry   May start Pt on amiodarone depending on response to cardizem     Paroxysmal atrial fibrillation  2/9/22: Paroxsymal Afib with rapid rate. Failed cardioversion x 2 after PHILIPPE 2/8/23. With exertion HR increases from 105 to 130. Cardizem  mg daily. Amiodarone gtt per protocol started. Transition from Lovenox-->Eliquis 5 mg bid. Plan to have EP evaluation scheduled outpatient. Electrolytes wnl. Troponin negative. TSH 1.8  Results for orders placed during the hospital encounter of 02/07/23  Echo  Interpretation Summary  · The left ventricle is normal in size with concentric remodeling and  · Atrial fibrillation observed.  · Mild tricuspid regurgitation.  · The estimated ejection fraction is 55%.  · Normal right ventricular size with normal right ventricular systolic function.        HENRY (obstructive sleep apnea)  CPAP QHS ordered     HTN (hypertension)  BP appears to be well-controlled at this time   Home BP meds appear to be currently help   Hydralazine prn with parameters ordered     AMEE (acute kidney " injury)  May be 2/2 to dehydration as well as hypoperfusion 2/2 to tachyarrhythmia  IVF   Will continue to monitor BMP   2/9/23: sCre 1.03 - improved        VTE Risk Mitigation (From admission, onward)           Ordered     apixaban tablet 5 mg  2 times daily         02/09/23 1357     Reason for No Pharmacological VTE Prophylaxis  Once        Question:  Reasons:  Answer:  Already adequately anticoagulated on oral Anticoagulants    02/07/23 0831     Place sequential compression device  Until discontinued         02/07/23 0831     IP VTE HIGH RISK PATIENT  Once         02/07/23 0831                    Monster Navarro, HARIP  Cardiology  [facility] - Short Stay Unit    PT seen and examined, chart reviewed, essentially agree with findings as above.     CC: Palpitations with exertion  HPI: Pt reports heart racing with minimal activity, is asymptomatic at rest  PE: agree with above  Labs, cardioversion report reviewed    IMP/Plan:  A fib with RVR, unsuccessful DC cardioversion, heart rate response not fully controlled on diltiazem, will discontinue, begin IV amiodarone loading, increase activity as tolerated.       Will follow with you, encourage ambulation

## 2023-02-09 NOTE — ASSESSMENT & PLAN NOTE
Suspect atrial flutter was the original rhythm.    Multiple medications were given and patient finally controlled with diltiazem drip at 15 and digoxin.  TSH normal.  Obesity and sleep apnea or risk factors as well as asthma medications.  Cardiology consulted given difficulty controlling initially and new onset.    Therapeutic Lovenox per recommendation of Cardiology.  Continue diltiazem drip.  Note that she was loaded with digoxin and may continue this tomorrow in consultation with Cardiology.    2/8:  PHILIPPE and possible cardioversion today.

## 2023-02-09 NOTE — PLAN OF CARE
Problem: Adult Inpatient Plan of Care  Goal: Plan of Care Review  2/8/2023 2157 by Keya Chapin RN  Outcome: Ongoing, Progressing  2/8/2023 2156 by Keya Chapin RN  Outcome: Ongoing, Progressing  Goal: Patient-Specific Goal (Individualized)  2/8/2023 2157 by Keya Chapin RN  Outcome: Ongoing, Progressing  2/8/2023 2156 by Keya Chapin RN  Outcome: Ongoing, Progressing  Goal: Absence of Hospital-Acquired Illness or Injury  2/8/2023 2157 by Keya Chpain RN  Outcome: Ongoing, Progressing  2/8/2023 2156 by Keya Chapin RN  Outcome: Ongoing, Progressing  Goal: Optimal Comfort and Wellbeing  2/8/2023 2157 by Keya Chapin RN  Outcome: Ongoing, Progressing  2/8/2023 2156 by Keya Chapin RN  Outcome: Ongoing, Progressing  Goal: Readiness for Transition of Care  2/8/2023 2157 by Keya Chapin RN  Outcome: Ongoing, Progressing  2/8/2023 2156 by Keya Chapin RN  Outcome: Ongoing, Progressing     Problem: Bariatric Environmental Safety  Goal: Safety Maintained with Care  2/8/2023 2157 by Keya Chapin RN  Outcome: Ongoing, Progressing  2/8/2023 2156 by Keya Chapin RN  Outcome: Ongoing, Progressing     Problem: Fluid and Electrolyte Imbalance (Acute Kidney Injury/Impairment)  Goal: Fluid and Electrolyte Balance  2/8/2023 2157 by Keya Chapin RN  Outcome: Ongoing, Progressing  2/8/2023 2156 by Keya Chapin RN  Outcome: Ongoing, Progressing     Problem: Oral Intake Inadequate (Acute Kidney Injury/Impairment)  Goal: Optimal Nutrition Intake  2/8/2023 2157 by Keya Chapin RN  Outcome: Ongoing, Progressing  2/8/2023 2156 by Keya Chapin RN  Outcome: Ongoing, Progressing     Problem: Renal Function Impairment (Acute Kidney Injury/Impairment)  Goal: Effective Renal Function  2/8/2023 2157 by Keya Chapin RN  Outcome: Ongoing, Progressing  2/8/2023 2156 by Keya Chapin RN  Outcome: Ongoing, Progressing     Problem: Gas Exchange Impaired  Goal: Optimal Gas Exchange  2/8/2023 2157 by Keya Chapin  RN  Outcome: Ongoing, Progressing  2/8/2023 2156 by Keya Chapin RN  Outcome: Ongoing, Progressing

## 2023-02-09 NOTE — PLAN OF CARE
Problem: Adult Inpatient Plan of Care  Goal: Plan of Care Review  Outcome: Ongoing, Progressing  Goal: Patient-Specific Goal (Individualized)  Outcome: Ongoing, Progressing  Goal: Absence of Hospital-Acquired Illness or Injury  Outcome: Ongoing, Progressing  Goal: Optimal Comfort and Wellbeing  Outcome: Ongoing, Progressing  Goal: Readiness for Transition of Care  Outcome: Ongoing, Progressing     Problem: Bariatric Environmental Safety  Goal: Safety Maintained with Care  Outcome: Ongoing, Progressing     Problem: Fluid and Electrolyte Imbalance (Acute Kidney Injury/Impairment)  Goal: Fluid and Electrolyte Balance  Outcome: Ongoing, Progressing     Problem: Oral Intake Inadequate (Acute Kidney Injury/Impairment)  Goal: Optimal Nutrition Intake  Outcome: Ongoing, Progressing     Problem: Renal Function Impairment (Acute Kidney Injury/Impairment)  Goal: Effective Renal Function  Outcome: Ongoing, Progressing     Problem: Gas Exchange Impaired  Goal: Optimal Gas Exchange  Outcome: Ongoing, Progressing     Problem: Noninvasive Ventilation Acute  Goal: Effective Unassisted Ventilation and Oxygenation  Outcome: Ongoing, Progressing

## 2023-02-09 NOTE — SUBJECTIVE & OBJECTIVE
Interval History: NAEO. Pt is failed cardioversion x 2. Hr continues to run tachycardic with exertion    Review of Systems   Constitutional: Positive for malaise/fatigue. Negative for chills.   Eyes:  Negative for visual disturbance.   Cardiovascular:  Positive for irregular heartbeat, leg swelling (chronic) and palpitations. Negative for paroxysmal nocturnal dyspnea and syncope.   Respiratory:  Positive for shortness of breath.    Gastrointestinal:  Negative for abdominal pain, constipation, nausea and vomiting.   Objective:     Vital Signs (Most Recent):  Temp: 98.3 °F (36.8 °C) (02/09/23 0754)  Pulse: 86 (02/09/23 0754)  Resp: 19 (02/09/23 0754)  BP: (!) 154/90 (02/09/23 1030)  SpO2: (!) 90 % (02/09/23 0754)   Vital Signs (24h Range):  Temp:  [97.6 °F (36.4 °C)-98.3 °F (36.8 °C)] 98.3 °F (36.8 °C)  Pulse:  [61-96] 86  Resp:  [18-19] 19  SpO2:  [90 %-98 %] 90 %  BP: (124-154)/(55-90) 154/90     Weight: 135.6 kg (299 lb)  Body mass index is 48.26 kg/m².     SpO2: (!) 90 %       No intake or output data in the 24 hours ending 02/09/23 1247    Lines/Drains/Airways       Peripheral Intravenous Line  Duration                  Peripheral IV - Single Lumen 02/09/23 1134 22 G Anterior;Left Forearm <1 day                    Physical Exam  Vitals reviewed.   Constitutional:       General: She is not in acute distress.     Appearance: She is obese. She is not ill-appearing, toxic-appearing or diaphoretic.   HENT:      Head: Normocephalic and atraumatic.      Mouth/Throat:      Mouth: Mucous membranes are moist.   Eyes:      General: No scleral icterus.        Right eye: No discharge.      Extraocular Movements: Extraocular movements intact.      Pupils: Pupils are equal, round, and reactive to light.   Cardiovascular:      Rate and Rhythm: Tachycardia present. Rhythm irregular.      Pulses: Normal pulses.      Heart sounds: No murmur heard.    Gallop present.   Pulmonary:      Effort: No respiratory distress.      Breath  sounds: Normal breath sounds. No stridor. No wheezing, rhonchi or rales.   Abdominal:      General: Bowel sounds are normal.      Palpations: Abdomen is soft.      Tenderness: There is no abdominal tenderness. There is no guarding or rebound.   Musculoskeletal:         General: Swelling present.      Cervical back: Normal range of motion.      Right lower leg: Edema present.      Left lower leg: Edema present.   Skin:     General: Skin is warm and dry.      Capillary Refill: Capillary refill takes less than 2 seconds.      Coloration: Skin is not jaundiced.   Neurological:      General: No focal deficit present.      Mental Status: She is alert and oriented to person, place, and time.      Cranial Nerves: No cranial nerve deficit.      Sensory: No sensory deficit.   Psychiatric:         Mood and Affect: Mood normal.         Behavior: Behavior normal.       Significant Labs: All pertinent lab results from the last 24 hours have been reviewed.    Significant Imaging: EKG:

## 2023-02-09 NOTE — PLAN OF CARE
Problem: Gas Exchange Impaired  Goal: Optimal Gas Exchange  2/8/2023 2308 by Kimmy Brice CRTT  Outcome: Ongoing, Progressing  2/8/2023 2040 by Kimmy Brice CRTT  Outcome: Ongoing, Progressing     Problem: Noninvasive Ventilation Acute  Goal: Effective Unassisted Ventilation and Oxygenation  Outcome: Ongoing, Progressing

## 2023-02-09 NOTE — PROGRESS NOTES
[facility] - Short Stay [facility] Medicine  Progress Note    Patient Name: Kat Nam  MRN: [id number]  Patient Class: OP- Observation   Admission Date: 2/7/2023  Length of Stay: 0 days  Attending Physician: George Berry MD  Primary Care Provider: Bony Mary MD        Subjective:     Principal Problem:Tachyarrhythmia    HPI:  Ms. Nam is a 61yo woman h/o asthma, GERD, HTN, HLD, Class 3 obesity, HENRY on CPAP, premature atrial contractions with bigeminy, May-Thurner syndrome who presents with progressively worsening palpitations and shortness of breath.  She describes the palpitations or chest as feeling like bubbles.  She states that the bubbles have been more frequent over the last few days over the last 3 weeks.      She states that she works 7 on 7 off during the night shift in the [employer] and this alters her sleep cycle.  She thought that these palpitations were relatively normal given her abnormal sleep patterns.  Furthermore she is had palpitations in the past.  However she now has shortness of breath so this has become more concerning for her.  She denies any chest pain.  She notes that she intermittently has lower extremity swelling but she has May Thurner syndrome and she has had discolorations and intermittent swelling of her lower extremities.  She states she was seen by Dr. Marcos for PACs and there were no changes in her management.        ER course:   Patient presented with pulse of 245 and blood pressure 238/59.  She was given multiple medications as detailed below.  She experienced a pause after having adenosine but did not reveal the underlying rhythm.  There were flutter waves present.    Initial Vitals   BP Pulse Resp Temp SpO2   02/07/23 0444 02/07/23 0442 02/07/23 0442 02/07/23 0442 02/07/23 0442   (!) 238/59 (!) 245 (!) 26 97.8 °F (36.6 °C) 98      Medications   diltiaZEM (CARDIZEM) 125 mg in dextrose 5 % (D5W) 125 mL infusion (15 mg/hr Intravenous  "Rate/Dose Change 2/7/23 0709)   digoxin injection 500 mcg (has no administration in time range)   metoprolol injection 5 mg (has no administration in time range)   metoprolol injection 5 mg (5 mg Intravenous Given 2/7/23 0530)   sodium chloride 0.9% bolus 1,000 mL 1,000 mL (0 mLs Intravenous Stopped 2/7/23 0728)   adenosine injection 12 mg (12 mg Intravenous Given 2/7/23 0530)   adenosine injection 6 mg (6 mg Intravenous Given 2/7/23 0530)   metoprolol injection 5 mg (5 mg Intravenous Given 2/7/23 0531)       HPI per cardiology team:     Pt is a 62 y.o.f. with hx of asthma (reports daily symbicort use), GERD, hypertension, hyperlipidemia, obesity, sleep apnea (compliant with nightly CPAP) and May-Thurner Syndrome presenting with palpitations. Pt's daughter and  are in the room at the time of my exam. Pt works as an employee with the [employer] and felt a sensation which she describes as like having "bubbles" in her chest last night around 0400. 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 Pt has been experiencing similar episodes of a "bubble" sensation in her chest for the last few weeks. Each episode has lasted approx. 10-15 minutes and is accompanied by weakness. Pt reports some SOB, diaphoresis, and a sensation of near syncope but denies chest pain, visual disturbances, fevers, chills and nausea.      Of note, Pt was seen by Dr. Marcos in April of last year. Pt was found to have a frequent PAC burden but no evidence of Afib. Echo and stress test which were performed in early 2020 were both documented to be unremarkable.    Workup thus far is notable for the following: CXR reveals no acute cardiopulmonary process. Latest EKG reveals atrial flutter with a rate of 124. Troponins were negative for acute ischemia x 2. NT-proBNP was 1447.  Potassium was mildly elevated at 5.2. Magnesium was wnl. TSH was wnl. H/H were wnl.  In the ED, " Pt was initially treated with 6mg then 12mg of adenosine with brief approx. 10 second conversion to sinus rhythm. Her HR then increased back to 245 bpm. Pt was subsequently treated with IV Lopressor as well as Cardizem infusion and 1 dose of digoxin with successful reduction of her rate.     Cardiology has been consulted for evaluation and management of A-flutter.          Overview/Hospital Course:  2/8:  Plan for PHILIPPE with possible cardioversion today.      Interval History:     Review of Systems   Constitutional:  Negative for activity change, chills, fatigue and fever.   HENT:  Negative for congestion and sore throat.    Respiratory:  Positive for shortness of breath. Negative for chest tightness.    Cardiovascular:  Positive for palpitations.   Gastrointestinal:  Negative for abdominal distention, abdominal pain and diarrhea.   Endocrine: Negative for cold intolerance and heat intolerance.   Genitourinary:  Negative for dysuria.   Musculoskeletal:  Negative for arthralgias and back pain.   Skin:  Negative for color change and rash.   Neurological:  Negative for dizziness, tremors and headaches.   Psychiatric/Behavioral:  Negative for agitation. The patient is not nervous/anxious.    Objective:     Vital Signs (Most Recent):  Temp: 97.6 °F (36.4 °C) (02/08/23 1901)  Pulse: 96 (02/08/23 1901)  Resp: 18 (02/08/23 1901)  BP: 124/72 (02/08/23 1901)  SpO2: (!) 94 % (02/08/23 2038)   Vital Signs (24h Range):  Temp:  [97.6 °F (36.4 °C)-98.3 °F (36.8 °C)] 97.6 °F (36.4 °C)  Pulse:  [] 96  Resp:  [17-20] 18  SpO2:  [92 %-98 %] 94 %  BP: (109-142)/(48-78) 124/72     Weight: 135.6 kg (299 lb)  Body mass index is 48.26 kg/m².    Intake/Output Summary (Last 24 hours) at 2/8/2023 7601  Last data filed at 2/8/2023 0549  Gross per 24 hour   Intake 590 ml   Output --   Net 590 ml      Physical Exam  Constitutional:       Appearance: Normal appearance.   HENT:      Head: Normocephalic and atraumatic.      Nose: Nose normal.  "     Mouth/Throat:      Mouth: Mucous membranes are moist.      Pharynx: Oropharynx is clear.   Eyes:      Extraocular Movements: Extraocular movements intact.      Conjunctiva/sclera: Conjunctivae normal.      Pupils: Pupils are equal, round, and reactive to light.   Cardiovascular:      Rate and Rhythm: Normal rate and regular rhythm.      Pulses: Normal pulses.      Heart sounds: Normal heart sounds.   Pulmonary:      Effort: Pulmonary effort is normal.      Breath sounds: Normal breath sounds.   Abdominal:      General: Abdomen is flat. Bowel sounds are normal.      Palpations: Abdomen is soft.   Musculoskeletal:         General: Normal range of motion.      Cervical back: Normal range of motion and neck supple.   Skin:     General: Skin is warm and dry.   Neurological:      General: No focal deficit present.      Mental Status: She is alert and oriented to person, place, and time.   Psychiatric:         Mood and Affect: Mood normal.         Behavior: Behavior normal.       Significant Labs: All pertinent labs within the past 24 hours have been reviewed.    Significant Imaging: I have reviewed all pertinent imaging results/findings within the past 24 hours.      Assessment/Plan:      * Tachyarrhythmia  Suspect atrial flutter was the original rhythm.    Multiple medications were given and patient finally controlled with diltiazem drip at 15 and digoxin.  TSH normal.  Obesity and sleep apnea or risk factors as well as asthma medications.  Cardiology consulted given difficulty controlling initially and new onset.    Therapeutic Lovenox per recommendation of Cardiology.  Continue diltiazem drip.  Note that she was loaded with digoxin and may continue this tomorrow in consultation with Cardiology.    2/8:  PHILIPPE and possible cardioversion today.      PAC (premature atrial contraction)  History of PACs and palpitations for over 10 years and seen by Dr. Marcos April 2022.    Prior plan as detailed below:   "Echo and " "stress test were done in early 2020 which were both unremarkable.  EKG done yesterday and in office today shows very frequent PACs, atrial bigeminy  Patient is otherwise asymptomatic, denies any dizziness or syncope.  She does feel her heart quiver however denies any fast heart rates.  Will get a Holter monitor to assess her PAC burden.  At this point she does not have any other symptoms, which is continue giving her carvedilol.  If she becomes symptomatic can consider EP evaluation.  Holter monitor   Discontinue Eliquis, no evidence of atrial fibrillation."      GERD (gastroesophageal reflux disease)  Holding Protonix for now.      Moderate persistent asthma without complication  She does not appear to be an acute exacerbation at this time.    Will not order DuoNebs.        May-Thurner syndrome  Followed by dermatology and vein clinic in the past.    Eval for PFO on echo as this may increase risk of stroke particularly in patients with May-Thurner syndrome.      HENRY (obstructive sleep apnea)  On home C Pap and will continue this while in hospital.  Patient reports compliance with CPAP.      HTN (hypertension)  Continue home antihypertensives in consultation with Cardiology.      AMEE (acute kidney injury)  Patient with acute kidney injury likely due to IVVD/dehydration AMEE is currently stable. Labs reviewed- Renal function/electrolytes with Estimated Creatinine Clearance: 55.1 mL/min (A) (based on SCr of 1.5 mg/dL (H)). according to latest data. Monitor urine output and serial BMP and adjust therapy as needed. Avoid nephrotoxins and renally dose meds for GFR listed above.     AMEE of unclear etiology but may be related to patient's cardiac arrhythmia.  Will continue to monitor tomorrow.  Hydration p.r.n..    Morbid obesity with BMI of 45.0-49.9, adult  Body mass index is 48.26 kg/m². Morbid obesity complicates all aspects of disease management from diagnostic modalities to treatment. Weight loss encouraged and health " benefits explained to patient.           VTE Risk Mitigation (From admission, onward)         Ordered     enoxaparin injection 140 mg  Every 12 hours (non-standard times)         02/07/23 1653     Reason for No Pharmacological VTE Prophylaxis  Once        Question:  Reasons:  Answer:  Already adequately anticoagulated on oral Anticoagulants    02/07/23 0831     Place sequential compression device  Until discontinued         02/07/23 0831     IP VTE HIGH RISK PATIENT  Once         02/07/23 0831                Discharge Planning   CASA:      Code Status: Full Code   Is the patient medically ready for discharge?:     Reason for patient still in hospital (select all that apply): Treatment  Discharge Plan A: Home with family                  George Berry MD  Department of Hospital Medicine   [facility] - Short Stay Unit

## 2023-02-09 NOTE — SUBJECTIVE & OBJECTIVE
Interval History:     Review of Systems   Constitutional:  Negative for activity change, chills, fatigue and fever.   HENT:  Negative for congestion and sore throat.    Respiratory:  Positive for shortness of breath. Negative for chest tightness.    Cardiovascular:  Positive for palpitations.   Gastrointestinal:  Negative for abdominal distention, abdominal pain and diarrhea.   Endocrine: Negative for cold intolerance and heat intolerance.   Genitourinary:  Negative for dysuria.   Musculoskeletal:  Negative for arthralgias and back pain.   Skin:  Negative for color change and rash.   Neurological:  Negative for dizziness, tremors and headaches.   Psychiatric/Behavioral:  Negative for agitation. The patient is not nervous/anxious.    Objective:     Vital Signs (Most Recent):  Temp: 97.6 °F (36.4 °C) (02/08/23 1901)  Pulse: 96 (02/08/23 1901)  Resp: 18 (02/08/23 1901)  BP: 124/72 (02/08/23 1901)  SpO2: (!) 94 % (02/08/23 2038)   Vital Signs (24h Range):  Temp:  [97.6 °F (36.4 °C)-98.3 °F (36.8 °C)] 97.6 °F (36.4 °C)  Pulse:  [] 96  Resp:  [17-20] 18  SpO2:  [92 %-98 %] 94 %  BP: (109-142)/(48-78) 124/72     Weight: 135.6 kg (299 lb)  Body mass index is 48.26 kg/m².    Intake/Output Summary (Last 24 hours) at 2/8/2023 0242  Last data filed at 2/8/2023 0549  Gross per 24 hour   Intake 590 ml   Output --   Net 590 ml      Physical Exam  Constitutional:       Appearance: Normal appearance.   HENT:      Head: Normocephalic and atraumatic.      Nose: Nose normal.      Mouth/Throat:      Mouth: Mucous membranes are moist.      Pharynx: Oropharynx is clear.   Eyes:      Extraocular Movements: Extraocular movements intact.      Conjunctiva/sclera: Conjunctivae normal.      Pupils: Pupils are equal, round, and reactive to light.   Cardiovascular:      Rate and Rhythm: Normal rate and regular rhythm.      Pulses: Normal pulses.      Heart sounds: Normal heart sounds.   Pulmonary:      Effort: Pulmonary effort is normal.       Breath sounds: Normal breath sounds.   Abdominal:      General: Abdomen is flat. Bowel sounds are normal.      Palpations: Abdomen is soft.   Musculoskeletal:         General: Normal range of motion.      Cervical back: Normal range of motion and neck supple.   Skin:     General: Skin is warm and dry.   Neurological:      General: No focal deficit present.      Mental Status: She is alert and oriented to person, place, and time.   Psychiatric:         Mood and Affect: Mood normal.         Behavior: Behavior normal.       Significant Labs: All pertinent labs within the past 24 hours have been reviewed.    Significant Imaging: I have reviewed all pertinent imaging results/findings within the past 24 hours.

## 2023-02-09 NOTE — ASSESSMENT & PLAN NOTE
2/9/22: Paroxsymal Afib with rapid rate. Failed cardioversion x 2 after PHILIPPE 2/8/23. With exertion HR increases from 105 to 130. Cardizem  mg daily. Amiodarone gtt per protocol started. Transition from Lovenox-->Eliquis 5 mg bid. Plan to have EP evaluation scheduled outpatient. Electrolytes wnl. Troponin negative. TSH 1.8  Results for orders placed during the hospital encounter of 02/07/23  Echo  Interpretation Summary  · The left ventricle is normal in size with concentric remodeling and  · Atrial fibrillation observed.  · Mild tricuspid regurgitation.  · The estimated ejection fraction is 55%.  · Normal right ventricular size with normal right ventricular systolic function.

## 2023-02-10 LAB
ANION GAP SERPL CALCULATED.3IONS-SCNC: 15 MMOL/L (ref 7–16)
BASOPHILS # BLD AUTO: 0.05 K/UL (ref 0–0.2)
BASOPHILS NFR BLD AUTO: 0.9 % (ref 0–1)
BUN SERPL-MCNC: 12 MG/DL (ref 7–18)
BUN/CREAT SERPL: 13 (ref 6–20)
CALCIUM SERPL-MCNC: 9.2 MG/DL (ref 8.5–10.1)
CHLORIDE SERPL-SCNC: 104 MMOL/L (ref 98–107)
CHOLEST SERPL-MCNC: 163 MG/DL (ref 0–200)
CHOLEST/HDLC SERPL: 3.3 {RATIO}
CO2 SERPL-SCNC: 25 MMOL/L (ref 21–32)
CREAT SERPL-MCNC: 0.95 MG/DL (ref 0.55–1.02)
DIFFERENTIAL METHOD BLD: ABNORMAL
EGFR (NO RACE VARIABLE) (RUSH/TITUS): 68 ML/MIN/1.73M²
EOSINOPHIL # BLD AUTO: 0.23 K/UL (ref 0–0.5)
EOSINOPHIL NFR BLD AUTO: 4 % (ref 1–4)
ERYTHROCYTE [DISTWIDTH] IN BLOOD BY AUTOMATED COUNT: 14 % (ref 11.5–14.5)
GLUCOSE SERPL-MCNC: 87 MG/DL (ref 74–106)
HCT VFR BLD AUTO: 36.7 % (ref 38–47)
HDLC SERPL-MCNC: 49 MG/DL (ref 40–60)
HGB BLD-MCNC: 11.8 G/DL (ref 12–16)
IMM GRANULOCYTES # BLD AUTO: 0.02 K/UL (ref 0–0.04)
IMM GRANULOCYTES NFR BLD: 0.4 % (ref 0–0.4)
LDLC SERPL CALC-MCNC: 96 MG/DL
LDLC/HDLC SERPL: 2 {RATIO}
LYMPHOCYTES # BLD AUTO: 1.03 K/UL (ref 1–4.8)
LYMPHOCYTES NFR BLD AUTO: 18.1 % (ref 27–41)
MAGNESIUM SERPL-MCNC: 2.1 MG/DL (ref 1.7–2.3)
MCH RBC QN AUTO: 28.9 PG (ref 27–31)
MCHC RBC AUTO-ENTMCNC: 32.2 G/DL (ref 32–36)
MCV RBC AUTO: 90 FL (ref 80–96)
MONOCYTES # BLD AUTO: 0.36 K/UL (ref 0–0.8)
MONOCYTES NFR BLD AUTO: 6.3 % (ref 2–6)
MPC BLD CALC-MCNC: 12.2 FL (ref 9.4–12.4)
NEUTROPHILS # BLD AUTO: 3.99 K/UL (ref 1.8–7.7)
NEUTROPHILS NFR BLD AUTO: 70.3 % (ref 53–65)
NONHDLC SERPL-MCNC: 114 MG/DL
NRBC # BLD AUTO: 0 X10E3/UL
NRBC, AUTO (.00): 0 %
PLATELET # BLD AUTO: 219 K/UL (ref 150–400)
POTASSIUM SERPL-SCNC: 4 MMOL/L (ref 3.5–5.1)
RBC # BLD AUTO: 4.08 M/UL (ref 4.2–5.4)
SODIUM SERPL-SCNC: 140 MMOL/L (ref 136–145)
TRIGL SERPL-MCNC: 91 MG/DL (ref 35–150)
VLDLC SERPL-MCNC: 18 MG/DL
WBC # BLD AUTO: 5.68 K/UL (ref 4.5–11)

## 2023-02-10 PROCEDURE — 96376 TX/PRO/DX INJ SAME DRUG ADON: CPT

## 2023-02-10 PROCEDURE — 99233 PR SUBSEQUENT HOSPITAL CARE,LEVL III: ICD-10-PCS | Mod: GT,,, | Performed by: INTERNAL MEDICINE

## 2023-02-10 PROCEDURE — 80048 BASIC METABOLIC PNL TOTAL CA: CPT | Performed by: HOSPITALIST

## 2023-02-10 PROCEDURE — 25000003 PHARM REV CODE 250: Performed by: REGISTERED NURSE

## 2023-02-10 PROCEDURE — 83735 ASSAY OF MAGNESIUM: CPT | Performed by: HOSPITALIST

## 2023-02-10 PROCEDURE — 80061 LIPID PANEL: CPT | Performed by: REGISTERED NURSE

## 2023-02-10 PROCEDURE — 94761 N-INVAS EAR/PLS OXIMETRY MLT: CPT

## 2023-02-10 PROCEDURE — 25000003 PHARM REV CODE 250: Performed by: HOSPITALIST

## 2023-02-10 PROCEDURE — 25000003 PHARM REV CODE 250: Performed by: INTERNAL MEDICINE

## 2023-02-10 PROCEDURE — 25000003 PHARM REV CODE 250: Performed by: NURSE PRACTITIONER

## 2023-02-10 PROCEDURE — 11000001 HC ACUTE MED/SURG PRIVATE ROOM

## 2023-02-10 PROCEDURE — 99900031 HC PATIENT EDUCATION (STAT)

## 2023-02-10 PROCEDURE — 99900035 HC TECH TIME PER 15 MIN (STAT)

## 2023-02-10 PROCEDURE — 85025 COMPLETE CBC W/AUTO DIFF WBC: CPT | Performed by: HOSPITALIST

## 2023-02-10 PROCEDURE — 99233 SBSQ HOSP IP/OBS HIGH 50: CPT | Mod: GT,,, | Performed by: INTERNAL MEDICINE

## 2023-02-10 PROCEDURE — G0378 HOSPITAL OBSERVATION PER HR: HCPCS

## 2023-02-10 PROCEDURE — 63600175 PHARM REV CODE 636 W HCPCS: Performed by: NURSE PRACTITIONER

## 2023-02-10 PROCEDURE — 99233 SBSQ HOSP IP/OBS HIGH 50: CPT | Mod: ,,, | Performed by: HOSPITALIST

## 2023-02-10 PROCEDURE — 94660 CPAP INITIATION&MGMT: CPT

## 2023-02-10 PROCEDURE — 99233 PR SUBSEQUENT HOSPITAL CARE,LEVL III: ICD-10-PCS | Mod: ,,, | Performed by: HOSPITALIST

## 2023-02-10 RX ORDER — METOPROLOL TARTRATE 50 MG/1
50 TABLET ORAL 2 TIMES DAILY
Status: DISCONTINUED | OUTPATIENT
Start: 2023-02-10 | End: 2023-02-11 | Stop reason: HOSPADM

## 2023-02-10 RX ORDER — DIGOXIN 0.25 MG/ML
250 INJECTION INTRAMUSCULAR; INTRAVENOUS ONCE
Status: COMPLETED | OUTPATIENT
Start: 2023-02-10 | End: 2023-02-10

## 2023-02-10 RX ORDER — DIGOXIN 125 MCG
0.12 TABLET ORAL DAILY
Status: DISCONTINUED | OUTPATIENT
Start: 2023-02-10 | End: 2023-02-11 | Stop reason: HOSPADM

## 2023-02-10 RX ADMIN — DIGOXIN 0.12 MG: 125 TABLET ORAL at 01:02

## 2023-02-10 RX ADMIN — APIXABAN 5 MG: 5 TABLET, FILM COATED ORAL at 08:02

## 2023-02-10 RX ADMIN — DIGOXIN 250 MCG: 0.25 INJECTION INTRAMUSCULAR; INTRAVENOUS at 01:02

## 2023-02-10 RX ADMIN — AMIODARONE HYDROCHLORIDE 400 MG: 200 TABLET ORAL at 09:02

## 2023-02-10 RX ADMIN — APIXABAN 5 MG: 5 TABLET, FILM COATED ORAL at 09:02

## 2023-02-10 RX ADMIN — DILTIAZEM HYDROCHLORIDE 240 MG: 240 CAPSULE, COATED, EXTENDED RELEASE ORAL at 08:02

## 2023-02-10 RX ADMIN — METOPROLOL TARTRATE 50 MG: 50 TABLET, FILM COATED ORAL at 01:02

## 2023-02-10 RX ADMIN — METOPROLOL TARTRATE 50 MG: 50 TABLET, FILM COATED ORAL at 09:02

## 2023-02-10 RX ADMIN — PANTOPRAZOLE SODIUM 40 MG: 40 TABLET, DELAYED RELEASE ORAL at 08:02

## 2023-02-10 RX ADMIN — AMIODARONE HYDROCHLORIDE 400 MG: 200 TABLET ORAL at 08:02

## 2023-02-10 NOTE — SUBJECTIVE & OBJECTIVE
Interval History: Reports palpitations with exertion. HR elevated. No chest pain.    Review of Systems   Constitutional: Negative for chills.   Eyes:  Negative for visual disturbance.   Cardiovascular:  Positive for irregular heartbeat and palpitations. Negative for paroxysmal nocturnal dyspnea and syncope. Leg swelling: chronic.  Gastrointestinal:  Negative for abdominal pain, constipation, nausea and vomiting.   Objective:     Vital Signs (Most Recent):  Temp: 97.5 °F (36.4 °C) (02/10/23 0704)  Pulse: 104 (02/10/23 0704)  Resp: 16 (02/10/23 0704)  BP: (!) 158/74 (02/10/23 0704)  SpO2: 96 % (02/10/23 0704)   Vital Signs (24h Range):  Temp:  [97.5 °F (36.4 °C)-98.3 °F (36.8 °C)] 97.5 °F (36.4 °C)  Pulse:  [] 104  Resp:  [16-20] 16  SpO2:  [96 %-98 %] 96 %  BP: (105-158)/(57-99) 158/74     Weight: 135.6 kg (299 lb)  Body mass index is 48.26 kg/m².     SpO2: 96 %       No intake or output data in the 24 hours ending 02/10/23 1215    Lines/Drains/Airways       Peripheral Intravenous Line  Duration                  Peripheral IV - Single Lumen 02/09/23 1134 22 G Anterior;Left Forearm 1 day                    Physical Exam  Vitals reviewed.   Constitutional:       General: She is not in acute distress.     Appearance: She is obese. She is not ill-appearing, toxic-appearing or diaphoretic.   HENT:      Head: Normocephalic and atraumatic.      Mouth/Throat:      Mouth: Mucous membranes are moist.   Eyes:      General: No scleral icterus.        Right eye: No discharge.      Extraocular Movements: Extraocular movements intact.      Pupils: Pupils are equal, round, and reactive to light.   Cardiovascular:      Rate and Rhythm: Tachycardia present. Rhythm irregular.      Pulses: Normal pulses.      Heart sounds: No murmur heard.  Pulmonary:      Effort: No respiratory distress.      Breath sounds: Normal breath sounds. No stridor. No wheezing, rhonchi or rales.   Abdominal:      General: Bowel sounds are normal.       Palpations: Abdomen is soft.      Tenderness: There is no abdominal tenderness. There is no guarding or rebound.   Musculoskeletal:         General: Swelling present.      Cervical back: Normal range of motion.      Right lower leg: No edema.   Skin:     General: Skin is warm and dry.      Capillary Refill: Capillary refill takes less than 2 seconds.      Coloration: Skin is not jaundiced.   Neurological:      General: No focal deficit present.      Mental Status: She is alert and oriented to person, place, and time.      Cranial Nerves: No cranial nerve deficit.      Sensory: No sensory deficit.   Psychiatric:         Mood and Affect: Mood normal.         Behavior: Behavior normal.       Significant Labs: All pertinent lab results from the last 24 hours have been reviewed.    Significant Imaging: EKG:

## 2023-02-10 NOTE — ASSESSMENT & PLAN NOTE
2/9/22: Paroxsymal Afib with rapid rate. Failed cardioversion x 2 after PHILIPPE 2/8/23. With exertion HR increases from 105 to 130. Cardizem  mg daily. Amiodarone gtt per protocol started. Transition from Lovenox-->Eliquis 5 mg bid. Plan to have EP evaluation scheduled outpatient. Electrolytes wnl. Troponin negative. TSH 1.8  Results for orders placed during the hospital encounter of 02/07/23  Echo  Interpretation Summary  · The left ventricle is normal in size with concentric remodeling and  · Atrial fibrillation observed.  · Mild tricuspid regurgitation.  · The estimated ejection fraction is 55%.  · Normal right ventricular size with normal right ventricular systolic function.  2/10/23: Amiodarone transitioned to 400 mg po bid. Cardizem stopped. Lopressor po 50 mg bid. Digoxin 0.250 iv once. Digoxin 0.125 po daily. Continue to monitor

## 2023-02-10 NOTE — PLAN OF CARE
Problem: Adult Inpatient Plan of Care  Goal: Plan of Care Review  Outcome: Ongoing, Progressing  Goal: Patient-Specific Goal (Individualized)  Outcome: Ongoing, Progressing  Goal: Absence of Hospital-Acquired Illness or Injury  Outcome: Ongoing, Progressing  Goal: Optimal Comfort and Wellbeing  Outcome: Ongoing, Progressing  Goal: Readiness for Transition of Care  Outcome: Ongoing, Progressing     Problem: Bariatric Environmental Safety  Goal: Safety Maintained with Care  Outcome: Ongoing, Progressing     Problem: Fluid and Electrolyte Imbalance (Acute Kidney Injury/Impairment)  Goal: Fluid and Electrolyte Balance  Outcome: Ongoing, Progressing     Problem: Oral Intake Inadequate (Acute Kidney Injury/Impairment)  Goal: Optimal Nutrition Intake  Outcome: Ongoing, Progressing     Problem: Renal Function Impairment (Acute Kidney Injury/Impairment)  Goal: Effective Renal Function  Outcome: Ongoing, Progressing     Problem: Gas Exchange Impaired  Goal: Optimal Gas Exchange  Outcome: Ongoing, Progressing     Problem: Noninvasive Ventilation Acute  Goal: Effective Unassisted Ventilation and Oxygenation  Outcome: Ongoing, Progressing     Problem: Fall Injury Risk  Goal: Absence of Fall and Fall-Related Injury  Outcome: Ongoing, Progressing   Reviewed POC

## 2023-02-10 NOTE — SUBJECTIVE & OBJECTIVE
Interval History:     Review of Systems   Constitutional:  Negative for activity change, chills, fatigue and fever.   HENT:  Negative for congestion and sore throat.    Respiratory:  Positive for shortness of breath. Negative for chest tightness.    Cardiovascular:  Positive for palpitations.   Gastrointestinal:  Negative for abdominal distention, abdominal pain and diarrhea.   Endocrine: Negative for cold intolerance and heat intolerance.   Genitourinary:  Negative for dysuria.   Musculoskeletal:  Negative for arthralgias and back pain.   Skin:  Negative for color change and rash.   Neurological:  Negative for dizziness, tremors and headaches.   Psychiatric/Behavioral:  Negative for agitation. The patient is not nervous/anxious.    Objective:     Vital Signs (Most Recent):  Temp: 98.3 °F (36.8 °C) (02/09/23 1900)  Pulse: 74 (02/09/23 1900)  Resp: 20 (02/09/23 1900)  BP: (!) 116/57 (02/09/23 1900)  SpO2: 98 % (02/09/23 1900)   Vital Signs (24h Range):  Temp:  [97.6 °F (36.4 °C)-98.3 °F (36.8 °C)] 98.3 °F (36.8 °C)  Pulse:  [] 74  Resp:  [18-22] 20  SpO2:  [90 %-98 %] 98 %  BP: (101-154)/(55-99) 116/57     Weight: 135.6 kg (299 lb)  Body mass index is 48.26 kg/m².  No intake or output data in the 24 hours ending 02/09/23 2024   Physical Exam  Constitutional:       Appearance: Normal appearance.   HENT:      Head: Normocephalic and atraumatic.      Nose: Nose normal.      Mouth/Throat:      Mouth: Mucous membranes are moist.      Pharynx: Oropharynx is clear.   Eyes:      Extraocular Movements: Extraocular movements intact.      Conjunctiva/sclera: Conjunctivae normal.      Pupils: Pupils are equal, round, and reactive to light.   Cardiovascular:      Rate and Rhythm: Normal rate and regular rhythm.      Pulses: Normal pulses.      Heart sounds: Normal heart sounds.   Pulmonary:      Effort: Pulmonary effort is normal.      Breath sounds: Normal breath sounds.   Abdominal:      General: Abdomen is flat. Bowel  sounds are normal.      Palpations: Abdomen is soft.   Musculoskeletal:         General: Normal range of motion.      Cervical back: Normal range of motion and neck supple.   Skin:     General: Skin is warm and dry.   Neurological:      General: No focal deficit present.      Mental Status: She is alert and oriented to person, place, and time.   Psychiatric:         Mood and Affect: Mood normal.         Behavior: Behavior normal.       Significant Labs: All pertinent labs within the past 24 hours have been reviewed.    Significant Imaging: I have reviewed all pertinent imaging results/findings within the past 24 hours.

## 2023-02-10 NOTE — PLAN OF CARE
Plan of care note. Continued workup by cardiology. Possible dc soon home with family. Will continue to follow.

## 2023-02-10 NOTE — PLAN OF CARE
Problem: Adult Inpatient Plan of Care  Goal: Plan of Care Review  Outcome: Ongoing, Progressing  Flowsheets (Taken 2/9/2023 2300)  Plan of Care Reviewed With: patient  Goal: Patient-Specific Goal (Individualized)  Outcome: Ongoing, Progressing  Flowsheets (Taken 2/9/2023 2300)  Anxieties, Fears or Concerns: pain  Individualized Care Needs: pain management  Goal: Absence of Hospital-Acquired Illness or Injury  Outcome: Ongoing, Progressing  Intervention: Identify and Manage Fall Risk  Flowsheets (Taken 2/9/2023 2300)  Safety Promotion/Fall Prevention:   assistive device/personal item within reach   medications reviewed  Intervention: Prevent Skin Injury  Flowsheets (Taken 2/9/2023 2300)  Body Position: position changed independently  Skin Protection: adhesive use limited  Intervention: Prevent and Manage VTE (Venous Thromboembolism) Risk  Flowsheets (Taken 2/9/2023 2300)  Activity Management: Ambulated -L4  VTE Prevention/Management: remove, assess skin, and reapply sequential compression device  Range of Motion:   active ROM (range of motion) encouraged   ROM (range of motion) performed  Intervention: Prevent Infection  Flowsheets (Taken 2/9/2023 2300)  Infection Prevention:   rest/sleep promoted   hand hygiene promoted  Goal: Optimal Comfort and Wellbeing  Outcome: Ongoing, Progressing  Intervention: Monitor Pain and Promote Comfort  Flowsheets (Taken 2/9/2023 2300)  Pain Management Interventions: care clustered  Intervention: Provide Person-Centered Care  Flowsheets (Taken 2/9/2023 2300)  Trust Relationship/Rapport: care explained  Goal: Readiness for Transition of Care  Outcome: Ongoing, Progressing     Problem: Bariatric Environmental Safety  Goal: Safety Maintained with Care  Outcome: Ongoing, Progressing     Problem: Fluid and Electrolyte Imbalance (Acute Kidney Injury/Impairment)  Goal: Fluid and Electrolyte Balance  Outcome: Ongoing, Progressing     Problem: Oral Intake Inadequate (Acute Kidney  Injury/Impairment)  Goal: Optimal Nutrition Intake  Outcome: Ongoing, Progressing     Problem: Renal Function Impairment (Acute Kidney Injury/Impairment)  Goal: Effective Renal Function  Outcome: Ongoing, Progressing     Problem: Gas Exchange Impaired  Goal: Optimal Gas Exchange  Outcome: Ongoing, Progressing     Problem: Noninvasive Ventilation Acute  Goal: Effective Unassisted Ventilation and Oxygenation  Outcome: Ongoing, Progressing

## 2023-02-10 NOTE — PROGRESS NOTES
[facility] Medicine  Progress Note    Patient Name: Kat Nam  MRN: [id number]  Patient Class: OP- Observation   Admission Date: 2/7/2023  Length of Stay: 0 days  Attending Physician: George Berry MD  Primary Care Provider: Bony Mary MD        Subjective:     Principal Problem:Tachyarrhythmia      HPI:  Ms. Nam is a 63yo woman h/o asthma, GERD, HTN, HLD, Class 3 obesity, HENRY on CPAP, premature atrial contractions with bigeminy, May-Thurner syndrome who presents with progressively worsening palpitations and shortness of breath.  She describes the palpitations or chest as feeling like bubbles.  She states that the bubbles have been more frequent over the last few days over the last 3 weeks.      She states that she works 7 on 7 off during the night shift in the [employer] and this alters her sleep cycle.  She thought that these palpitations were relatively normal given her abnormal sleep patterns.  Furthermore she is had palpitations in the past.  However she now has shortness of breath so this has become more concerning for her.  She denies any chest pain.  She notes that she intermittently has lower extremity swelling but she has May Thurner syndrome and she has had discolorations and intermittent swelling of her lower extremities.  She states she was seen by Dr. Marcos for PACs and there were no changes in her management.        ER course:   Patient presented with pulse of 245 and blood pressure 238/59.  She was given multiple medications as detailed below.  She experienced a pause after having adenosine but did not reveal the underlying rhythm.  There were flutter waves present.    Initial Vitals   BP Pulse Resp Temp SpO2   02/07/23 0444 02/07/23 0442 02/07/23 0442 02/07/23 0442 02/07/23 0442   (!) 238/59 (!) 245 (!) 26 97.8 °F (36.6 °C) 98      Medications   diltiaZEM (CARDIZEM) 125 mg in dextrose 5 % (D5W) 125 mL infusion (15 mg/hr  "Intravenous Rate/Dose Change 2/7/23 0709)   digoxin injection 500 mcg (has no administration in time range)   metoprolol injection 5 mg (has no administration in time range)   metoprolol injection 5 mg (5 mg Intravenous Given 2/7/23 0530)   sodium chloride 0.9% bolus 1,000 mL 1,000 mL (0 mLs Intravenous Stopped 2/7/23 0728)   adenosine injection 12 mg (12 mg Intravenous Given 2/7/23 0530)   adenosine injection 6 mg (6 mg Intravenous Given 2/7/23 0530)   metoprolol injection 5 mg (5 mg Intravenous Given 2/7/23 0531)       HPI per cardiology team:     Pt is a 62 y.o.f. with hx of asthma (reports daily symbicort use), GERD, hypertension, hyperlipidemia, obesity, sleep apnea (compliant with nightly CPAP) and May-Thurner Syndrome presenting with palpitations. Pt's daughter and  are in the room at the time of my exam. Pt works as an employee with the [employer] and felt a sensation which she describes as like having "bubbles" in her chest last night around 0400. 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 Pt has been experiencing similar episodes of a "bubble" sensation in her chest for the last few weeks. Each episode has lasted approx. 10-15 minutes and is accompanied by weakness. Pt reports some SOB, diaphoresis, and a sensation of near syncope but denies chest pain, visual disturbances, fevers, chills and nausea.      Of note, Pt was seen by Dr. Marcos in April of last year. Pt was found to have a frequent PAC burden but no evidence of Afib. Echo and stress test which were performed in early 2020 were both documented to be unremarkable.    Workup thus far is notable for the following: CXR reveals no acute cardiopulmonary process. Latest EKG reveals atrial flutter with a rate of 124. Troponins were negative for acute ischemia x 2. NT-proBNP was 1447.  Potassium was mildly elevated at 5.2. Magnesium was wnl. TSH was wnl. H/H were wnl.  " In the ED, Pt was initially treated with 6mg then 12mg of adenosine with brief approx. 10 second conversion to sinus rhythm. Her HR then increased back to 245 bpm. Pt was subsequently treated with IV Lopressor as well as Cardizem infusion and 1 dose of digoxin with successful reduction of her rate.     Cardiology has been consulted for evaluation and management of A-flutter.          Overview/Hospital Course:  2/8:  Plan for PHILIPPE with possible cardioversion today.    2/9:  Plan to switch to p.o. medication anticipate discharge tomorrow.      Interval History:     Review of Systems   Constitutional:  Negative for activity change, chills, fatigue and fever.   HENT:  Negative for congestion and sore throat.    Respiratory:  Positive for shortness of breath. Negative for chest tightness.    Cardiovascular:  Positive for palpitations.   Gastrointestinal:  Negative for abdominal distention, abdominal pain and diarrhea.   Endocrine: Negative for cold intolerance and heat intolerance.   Genitourinary:  Negative for dysuria.   Musculoskeletal:  Negative for arthralgias and back pain.   Skin:  Negative for color change and rash.   Neurological:  Negative for dizziness, tremors and headaches.   Psychiatric/Behavioral:  Negative for agitation. The patient is not nervous/anxious.    Objective:     Vital Signs (Most Recent):  Temp: 98.3 °F (36.8 °C) (02/09/23 1900)  Pulse: 74 (02/09/23 1900)  Resp: 20 (02/09/23 1900)  BP: (!) 116/57 (02/09/23 1900)  SpO2: 98 % (02/09/23 1900)   Vital Signs (24h Range):  Temp:  [97.6 °F (36.4 °C)-98.3 °F (36.8 °C)] 98.3 °F (36.8 °C)  Pulse:  [] 74  Resp:  [18-22] 20  SpO2:  [90 %-98 %] 98 %  BP: (101-154)/(55-99) 116/57     Weight: 135.6 kg (299 lb)  Body mass index is 48.26 kg/m².  No intake or output data in the 24 hours ending 02/09/23 2024   Physical Exam  Constitutional:       Appearance: Normal appearance.   HENT:      Head: Normocephalic and atraumatic.      Nose: Nose normal.       "Mouth/Throat:      Mouth: Mucous membranes are moist.      Pharynx: Oropharynx is clear.   Eyes:      Extraocular Movements: Extraocular movements intact.      Conjunctiva/sclera: Conjunctivae normal.      Pupils: Pupils are equal, round, and reactive to light.   Cardiovascular:      Rate and Rhythm: Normal rate and regular rhythm.      Pulses: Normal pulses.      Heart sounds: Normal heart sounds.   Pulmonary:      Effort: Pulmonary effort is normal.      Breath sounds: Normal breath sounds.   Abdominal:      General: Abdomen is flat. Bowel sounds are normal.      Palpations: Abdomen is soft.   Musculoskeletal:         General: Normal range of motion.      Cervical back: Normal range of motion and neck supple.   Skin:     General: Skin is warm and dry.   Neurological:      General: No focal deficit present.      Mental Status: She is alert and oriented to person, place, and time.   Psychiatric:         Mood and Affect: Mood normal.         Behavior: Behavior normal.       Significant Labs: All pertinent labs within the past 24 hours have been reviewed.    Significant Imaging: I have reviewed all pertinent imaging results/findings within the past 24 hours.      Assessment/Plan:      * Tachyarrhythmia  Suspect atrial flutter was the original rhythm.    Multiple medications were given and patient finally controlled with diltiazem drip at 15 and digoxin.  TSH normal.  Obesity and sleep apnea or risk factors as well as asthma medications.  Cardiology consulted given difficulty controlling initially and new onset.    Therapeutic Lovenox per recommendation of Cardiology.  Continue diltiazem drip.  Note that she was loaded with digoxin and may continue this tomorrow in consultation with Cardiology.    2/8:  PHILIPPE and possible cardioversion today.      PAC (premature atrial contraction)  History of PACs and palpitations for over 10 years and seen by Dr. Marcos April 2022.    Prior plan as detailed below:   "Echo and stress " "test were done in early 2020 which were both unremarkable.  EKG done yesterday and in office today shows very frequent PACs, atrial bigeminy  Patient is otherwise asymptomatic, denies any dizziness or syncope.  She does feel her heart quiver however denies any fast heart rates.  Will get a Holter monitor to assess her PAC burden.  At this point she does not have any other symptoms, which is continue giving her carvedilol.  If she becomes symptomatic can consider EP evaluation.  Holter monitor   Discontinue Eliquis, no evidence of atrial fibrillation."      GERD (gastroesophageal reflux disease)  Holding Protonix for now.      Moderate persistent asthma without complication  She does not appear to be an acute exacerbation at this time.    Will not order DuoNebs.        May-Thurner syndrome  Followed by dermatology and vein clinic in the past.    Eval for PFO on echo as this may increase risk of stroke particularly in patients with May-Thurner syndrome.      HENRY (obstructive sleep apnea)  On home C Pap and will continue this while in hospital.  Patient reports compliance with CPAP.      HTN (hypertension)  Continue home antihypertensives in consultation with Cardiology.      AMEE (acute kidney injury)  Patient with acute kidney injury likely due to IVVD/dehydration AMEE is currently stable. Labs reviewed- Renal function/electrolytes with Estimated Creatinine Clearance: 55.1 mL/min (A) (based on SCr of 1.5 mg/dL (H)). according to latest data. Monitor urine output and serial BMP and adjust therapy as needed. Avoid nephrotoxins and renally dose meds for GFR listed above.     AMEE of unclear etiology but may be related to patient's cardiac arrhythmia.  Will continue to monitor tomorrow.  Hydration p.r.n..    Morbid obesity with BMI of 45.0-49.9, adult  Body mass index is 48.26 kg/m². Morbid obesity complicates all aspects of disease management from diagnostic modalities to treatment. Weight loss encouraged and health " benefits explained to patient.           VTE Risk Mitigation (From admission, onward)         Ordered     apixaban tablet 5 mg  2 times daily         02/09/23 1357     Reason for No Pharmacological VTE Prophylaxis  Once        Question:  Reasons:  Answer:  Already adequately anticoagulated on oral Anticoagulants    02/07/23 0831     Place sequential compression device  Until discontinued         02/07/23 0831     IP VTE HIGH RISK PATIENT  Once         02/07/23 0831                Discharge Planning   CASA:      Code Status: Full Code   Is the patient medically ready for discharge?:     Reason for patient still in hospital (select all that apply): Treatment  Discharge Plan A: Home with family                  George Berry MD  Department of Hospital Medicine   [facility]

## 2023-02-10 NOTE — PROGRESS NOTES
[facility] Stay [facility]  Cardiology  Progress Note    Patient Name: Kat Nam  MRN: [id number]  Admission Date: 2/7/2023  Hospital Length of Stay: 0 days  Code Status: Full Code   Attending Physician: George Berry MD   Primary Care Physician: Bony Mary MD  Expected Discharge Date: 2/10/2023  Principal Problem:Tachyarrhythmia    Subjective:     Hospital Course:   No notes on file    Interval History: Reports palpitations with exertion. HR elevated. No chest pain.    Review of Systems   Constitutional: Negative for chills.   Eyes:  Negative for visual disturbance.   Cardiovascular:  Positive for irregular heartbeat and palpitations. Negative for paroxysmal nocturnal dyspnea and syncope. Leg swelling: chronic.  Gastrointestinal:  Negative for abdominal pain, constipation, nausea and vomiting.   Objective:     Vital Signs (Most Recent):  Temp: 97.5 °F (36.4 °C) (02/10/23 0704)  Pulse: 104 (02/10/23 0704)  Resp: 16 (02/10/23 0704)  BP: (!) 158/74 (02/10/23 0704)  SpO2: 96 % (02/10/23 0704)   Vital Signs (24h Range):  Temp:  [97.5 °F (36.4 °C)-98.3 °F (36.8 °C)] 97.5 °F (36.4 °C)  Pulse:  [] 104  Resp:  [16-20] 16  SpO2:  [96 %-98 %] 96 %  BP: (105-158)/(57-99) 158/74     Weight: 135.6 kg (299 lb)  Body mass index is 48.26 kg/m².     SpO2: 96 %       No intake or output data in the 24 hours ending 02/10/23 1215    Lines/Drains/Airways       Peripheral Intravenous Line  Duration                  Peripheral IV - Single Lumen 02/09/23 1134 22 G Anterior;Left Forearm 1 day                    Physical Exam  Vitals reviewed.   Constitutional:       General: She is not in acute distress.     Appearance: She is obese. She is not ill-appearing, toxic-appearing or diaphoretic.   HENT:      Head: Normocephalic and atraumatic.      Mouth/Throat:      Mouth: Mucous membranes are moist.   Eyes:      General: No scleral icterus.        Right eye: No discharge.      Extraocular Movements:  "Extraocular movements intact.      Pupils: Pupils are equal, round, and reactive to light.   Cardiovascular:      Rate and Rhythm: Tachycardia present. Rhythm irregular.      Pulses: Normal pulses.      Heart sounds: No murmur heard.  Pulmonary:      Effort: No respiratory distress.      Breath sounds: Normal breath sounds. No stridor. No wheezing, rhonchi or rales.   Abdominal:      General: Bowel sounds are normal.      Palpations: Abdomen is soft.      Tenderness: There is no abdominal tenderness. There is no guarding or rebound.   Musculoskeletal:         General: Swelling present.      Cervical back: Normal range of motion.      Right lower leg: No edema.   Skin:     General: Skin is warm and dry.      Capillary Refill: Capillary refill takes less than 2 seconds.      Coloration: Skin is not jaundiced.   Neurological:      General: No focal deficit present.      Mental Status: She is alert and oriented to person, place, and time.      Cranial Nerves: No cranial nerve deficit.      Sensory: No sensory deficit.   Psychiatric:         Mood and Affect: Mood normal.         Behavior: Behavior normal.       Significant Labs: All pertinent lab results from the last 24 hours have been reviewed.    Significant Imaging: EKG:      Assessment and Plan:     Brief HPI: 62 y.o.f. with hx of asthma (reports daily symbicort use), GERD, hypertension, hyperlipidemia, obesity, sleep apnea (compliant with nightly CPAP) and May-Thurner Syndrome presenting with palpitations. Pt's daughter and  are in the room at the time of my exam. Pt works as an employee with the [employer] and felt a sensation which she describes as like having "bubbles" in her chest last night around 0400. 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 Pt has been experiencing similar episodes of a "bubble" sensation in her chest for the last few weeks. Each episode has lasted " approx. 10-15 minutes and is accompanied by fatigue. Pt reports some SOB, diaphoresis, and a sensation of near syncope but denies chest pain, visual disturbances, fevers, chills and nausea.       Of note, Pt was seen by Dr. Marcos in April of last year. Pt was found to have a frequent PAC burden but no evidence of Afib. Echo and stress test which were performed in early 2020 were both documented to be unremarkable.    * Tachyarrhythmia  Suspect obesity coupled with dehydration and frequent beta agonist use as contributing factors   Currently rate controlled with cardizem infusion   Anticoagulation with Lovenox 1mg/kg q12h  ECHO ordered; results pending    D-dimer ordered for possible VTE   Emphasized compliance with CPAP to Pt   Will monitor telemetry   May start Pt on amiodarone depending on response to cardizem     Paroxysmal atrial fibrillation  2/9/22: Paroxsymal Afib with rapid rate. Failed cardioversion x 2 after PHILIPPE 2/8/23. With exertion HR increases from 105 to 130. Cardizem  mg daily. Amiodarone gtt per protocol started. Transition from Lovenox-->Eliquis 5 mg bid. Plan to have EP evaluation scheduled outpatient. Electrolytes wnl. Troponin negative. TSH 1.8  Results for orders placed during the hospital encounter of 02/07/23  Echo  Interpretation Summary  · The left ventricle is normal in size with concentric remodeling and  · Atrial fibrillation observed.  · Mild tricuspid regurgitation.  · The estimated ejection fraction is 55%.  · Normal right ventricular size with normal right ventricular systolic function.  2/10/23: Amiodarone transitioned to 400 mg po bid. Cardizem stopped. Lopressor po 50 mg bid. Digoxin 0.250 iv once. Digoxin 0.125 po daily. Continue to monitor        HENRY (obstructive sleep apnea)  CPAP QHS ordered     HTN (hypertension)  BP appears to be well-controlled at this time   Home BP meds appear to be currently help   Hydralazine prn with parameters ordered     AMEE (acute kidney  injury)  May be 2/2 to dehydration as well as hypoperfusion 2/2 to tachyarrhythmia  IVF   Will continue to monitor BMP   2/9/23: sCre 1.03 - improved        VTE Risk Mitigation (From admission, onward)           Ordered     apixaban tablet 5 mg  2 times daily         02/09/23 1357     Reason for No Pharmacological VTE Prophylaxis  Once        Question:  Reasons:  Answer:  Already adequately anticoagulated on oral Anticoagulants    02/07/23 0831     Place sequential compression device  Until discontinued         02/07/23 0831     IP VTE HIGH RISK PATIENT  Once         02/07/23 0831                    Monster Navarro, HARIP  Cardiology  [facility]      PT seen and examined, chart reviewed, essentially agree with findings as documented.      Heart rate response to A fib not fully controlled, dc diltiazem, start metoprolol tartrate 50 mg po q 12, add dig .125 mg q d, monitor overnight.  If heart rate response to A fib controlled with ambulation, can discharge in AM.  Will follow pt in office in 7 to 10 days with NP, anticipate 4 weeks systemic anticoagulation, plan outpatient DC cardioversion at that time if has not converted to NSR

## 2023-02-11 VITALS
OXYGEN SATURATION: 98 % | WEIGHT: 293 LBS | BODY MASS INDEX: 47.09 KG/M2 | DIASTOLIC BLOOD PRESSURE: 67 MMHG | RESPIRATION RATE: 16 BRPM | HEIGHT: 66 IN | TEMPERATURE: 98 F | SYSTOLIC BLOOD PRESSURE: 141 MMHG | HEART RATE: 101 BPM

## 2023-02-11 PROBLEM — I48.0 PAROXYSMAL ATRIAL FIBRILLATION: Status: RESOLVED | Noted: 2023-02-09 | Resolved: 2023-02-11

## 2023-02-11 PROCEDURE — 25000003 PHARM REV CODE 250: Performed by: INTERNAL MEDICINE

## 2023-02-11 PROCEDURE — 94660 CPAP INITIATION&MGMT: CPT

## 2023-02-11 PROCEDURE — 99900035 HC TECH TIME PER 15 MIN (STAT)

## 2023-02-11 PROCEDURE — 25000003 PHARM REV CODE 250: Performed by: REGISTERED NURSE

## 2023-02-11 PROCEDURE — 99239 HOSP IP/OBS DSCHRG MGMT >30: CPT | Mod: ,,, | Performed by: HOSPITALIST

## 2023-02-11 PROCEDURE — 25000003 PHARM REV CODE 250: Performed by: NURSE PRACTITIONER

## 2023-02-11 PROCEDURE — 99239 PR HOSPITAL DISCHARGE DAY,>30 MIN: ICD-10-PCS | Mod: ,,, | Performed by: HOSPITALIST

## 2023-02-11 PROCEDURE — 25000003 PHARM REV CODE 250: Performed by: HOSPITALIST

## 2023-02-11 RX ORDER — AMIODARONE HYDROCHLORIDE 200 MG/1
200 TABLET ORAL DAILY
Qty: 90 TABLET | Refills: 11 | Status: SHIPPED | OUTPATIENT
Start: 2023-02-11 | End: 2023-02-11 | Stop reason: SDUPTHER

## 2023-02-11 RX ORDER — AMIODARONE HYDROCHLORIDE 200 MG/1
200 TABLET ORAL DAILY
Qty: 90 TABLET | Refills: 1 | Status: SHIPPED | OUTPATIENT
Start: 2023-02-11 | End: 2023-04-24 | Stop reason: SDUPTHER

## 2023-02-11 RX ORDER — AMIODARONE HYDROCHLORIDE 200 MG/1
200 TABLET ORAL DAILY
Qty: 90 TABLET | Refills: 1 | Status: SHIPPED | OUTPATIENT
Start: 2023-02-11 | End: 2023-02-11 | Stop reason: SDUPTHER

## 2023-02-11 RX ORDER — METOPROLOL SUCCINATE 50 MG/1
50 TABLET, EXTENDED RELEASE ORAL DAILY
Qty: 90 TABLET | Refills: 1 | Status: SHIPPED | OUTPATIENT
Start: 2023-02-11 | End: 2023-03-10 | Stop reason: DRUGHIGH

## 2023-02-11 RX ORDER — AMIODARONE HYDROCHLORIDE 400 MG/1
400 TABLET ORAL 2 TIMES DAILY
Qty: 28 TABLET | Refills: 0 | Status: SHIPPED | OUTPATIENT
Start: 2023-02-11 | End: 2023-03-23

## 2023-02-11 RX ORDER — METOPROLOL SUCCINATE 50 MG/1
50 TABLET, EXTENDED RELEASE ORAL DAILY
Qty: 90 TABLET | Refills: 1 | Status: SHIPPED | OUTPATIENT
Start: 2023-02-11 | End: 2023-02-21 | Stop reason: SDUPTHER

## 2023-02-11 RX ORDER — DIGOXIN 125 MCG
0.12 TABLET ORAL DAILY
Qty: 90 TABLET | Refills: 1 | Status: SHIPPED | OUTPATIENT
Start: 2023-02-12 | End: 2023-02-11 | Stop reason: SDUPTHER

## 2023-02-11 RX ORDER — METOPROLOL SUCCINATE 25 MG/1
25 TABLET, EXTENDED RELEASE ORAL DAILY
Qty: 90 TABLET | Refills: 1 | Status: SHIPPED | OUTPATIENT
Start: 2023-02-11 | End: 2023-02-21

## 2023-02-11 RX ORDER — AMIODARONE HYDROCHLORIDE 400 MG/1
400 TABLET ORAL 2 TIMES DAILY
Qty: 28 TABLET | Refills: 0 | Status: SHIPPED | OUTPATIENT
Start: 2023-02-11 | End: 2023-02-11 | Stop reason: SDUPTHER

## 2023-02-11 RX ORDER — DIGOXIN 125 MCG
0.12 TABLET ORAL DAILY
Qty: 90 TABLET | Refills: 1 | Status: SHIPPED | OUTPATIENT
Start: 2023-02-12 | End: 2023-04-24 | Stop reason: SDUPTHER

## 2023-02-11 RX ADMIN — METOPROLOL TARTRATE 50 MG: 50 TABLET, FILM COATED ORAL at 08:02

## 2023-02-11 RX ADMIN — PANTOPRAZOLE SODIUM 40 MG: 40 TABLET, DELAYED RELEASE ORAL at 08:02

## 2023-02-11 RX ADMIN — APIXABAN 5 MG: 5 TABLET, FILM COATED ORAL at 08:02

## 2023-02-11 RX ADMIN — AMIODARONE HYDROCHLORIDE 400 MG: 200 TABLET ORAL at 08:02

## 2023-02-11 RX ADMIN — DIGOXIN 0.12 MG: 125 TABLET ORAL at 08:02

## 2023-02-11 NOTE — DISCHARGE SUMMARY
[facility]  [facility] Medicine  Discharge Summary      Patient Name: Kat Nam  MRN: [id number]  EDINSON: [id number]  Patient Class: IP- Inpatient  Admission Date: 2/7/2023  Hospital Length of Stay: 1 days  Discharge Date and Time:  02/11/2023 4:30 PM  Attending Physician: George Berry MD   Discharging Provider: George Berry MD  Primary Care Provider: Bony Mary MD    Primary Care Team: Networked reference to record PCT     HPI:   Ms. Nam is a 61yo woman h/o asthma, GERD, HTN, HLD, Class 3 obesity, HENRY on CPAP, premature atrial contractions with bigeminy, May-Thurner syndrome who presents with progressively worsening palpitations and shortness of breath.  She describes the palpitations or chest as feeling like bubbles.  She states that the bubbles have been more frequent over the last few days over the last 3 weeks.      She states that she works 7 on 7 off during the night shift in the [employer] and this alters her sleep cycle.  She thought that these palpitations were relatively normal given her abnormal sleep patterns.  Furthermore she is had palpitations in the past.  However she now has shortness of breath so this has become more concerning for her.  She denies any chest pain.  She notes that she intermittently has lower extremity swelling but she has May Thurner syndrome and she has had discolorations and intermittent swelling of her lower extremities.  She states she was seen by Dr. Marcos for PACs and there were no changes in her management.        ER course:   Patient presented with pulse of 245 and blood pressure 238/59.  She was given multiple medications as detailed below.  She experienced a pause after having adenosine but did not reveal the underlying rhythm.  There were flutter waves present.    Initial Vitals   BP Pulse Resp Temp SpO2   02/07/23 0444 02/07/23 0442 02/07/23 0442 02/07/23 0442 02/07/23 0442   (!) 238/59 (!) 245 (!) 26 97.8 °F  "(36.6 °C) 98      Medications   diltiaZEM (CARDIZEM) 125 mg in dextrose 5 % (D5W) 125 mL infusion (15 mg/hr Intravenous Rate/Dose Change 2/7/23 0709)   digoxin injection 500 mcg (has no administration in time range)   metoprolol injection 5 mg (has no administration in time range)   metoprolol injection 5 mg (5 mg Intravenous Given 2/7/23 0530)   sodium chloride 0.9% bolus 1,000 mL 1,000 mL (0 mLs Intravenous Stopped 2/7/23 0728)   adenosine injection 12 mg (12 mg Intravenous Given 2/7/23 0530)   adenosine injection 6 mg (6 mg Intravenous Given 2/7/23 0530)   metoprolol injection 5 mg (5 mg Intravenous Given 2/7/23 0531)       HPI per cardiology team:     Gigi is a 62 y.o.f. with hx of asthma (reports daily symbicort use), GERD, hypertension, hyperlipidemia, obesity, sleep apnea (compliant with nightly CPAP) and May-Thurner Syndrome presenting with palpitations. Pt's daughter and  are in the room at the time of my exam. Pt works as an employee with the [employer] and felt a sensation which she describes as like having "bubbles" in her chest last night around 0400. 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 Pt has been experiencing similar episodes of a "bubble" sensation in her chest for the last few weeks. Each episode has lasted approx. 10-15 minutes and is accompanied by weakness. Pt reports some SOB, diaphoresis, and a sensation of near syncope but denies chest pain, visual disturbances, fevers, chills and nausea.      Of note, Pt was seen by Dr. Marcos in April of last year. Pt was found to have a frequent PAC burden but no evidence of Afib. Echo and stress test which were performed in early 2020 were both documented to be unremarkable.    Workup thus far is notable for the following: CXR reveals no acute cardiopulmonary process. Latest EKG reveals atrial flutter with a rate of 124. Troponins were negative for acute ischemia x " 2. NT-proBNP was 1447.  Potassium was mildly elevated at 5.2. Magnesium was wnl. TSH was wnl. H/H were wnl.  In the ED, Pt was initially treated with 6mg then 12mg of adenosine with brief approx. 10 second conversion to sinus rhythm. Her HR then increased back to 245 bpm. Pt was subsequently treated with IV Lopressor as well as Cardizem infusion and 1 dose of digoxin with successful reduction of her rate.     Cardiology has been consulted for evaluation and management of A-flutter.          Procedure(s) (LRB):  Transesophageal echo (PHILIPPE) intra-procedure log documentation (N/A)  Cardioversion or Defibrillation (N/A)      Hospital Course:   2/8:  Plan for PHILIPPE with possible cardioversion today.    2/9:  Plan to switch to p.o. medication anticipate discharge tomorrow.    2/11:  Before discharge per Cardiology.  Will discharge on amiodarone 40 mg b.i.d., digoxin 125 mcg daily, metoprolol 50 mg b.i.d..       Goals of Care Treatment Preferences:  Code Status: Full Code      Consults:   Consults (From admission, onward)        Status Ordering Provider     Inpatient consult to Cardiology  Once        Provider:  DO Natan Duncan CHARANDLE S.          * Tachyarrhythmia  Suspect atrial flutter was the original rhythm.    Multiple medications were given and patient finally controlled with diltiazem drip at 15 and digoxin.  TSH normal.  Obesity and sleep apnea or risk factors as well as asthma medications.  Cardiology consulted given difficulty controlling initially and new onset.    Therapeutic Lovenox per recommendation of Cardiology.  Continue diltiazem drip.  Note that she was loaded with digoxin and may continue this tomorrow in consultation with Cardiology.    2/8:  PHILIPPE and possible cardioversion today.    2/11: Discharge on amiodarone 40 mg b.i.d., digoxin 125 mcg daily, Toprol 50mg daily per recommendation of cardiology.       PAC (premature atrial contraction)  History of PACs and palpitations for  "over 10 years and seen by Dr. Marcos April 2022.    Prior plan as detailed below:   "Echo and stress test were done in early 2020 which were both unremarkable.  EKG done yesterday and in office today shows very frequent PACs, atrial bigeminy  Patient is otherwise asymptomatic, denies any dizziness or syncope.  She does feel her heart quiver however denies any fast heart rates.  Will get a Holter monitor to assess her PAC burden.  At this point she does not have any other symptoms, which is continue giving her carvedilol.  If she becomes symptomatic can consider EP evaluation.  Holter monitor   Discontinue Eliquis, no evidence of atrial fibrillation."      GERD (gastroesophageal reflux disease)  Holding Protonix for now.      Moderate persistent asthma without complication  She does not appear to be an acute exacerbation at this time.    Will not order DuoNebs.        May-Thurner syndrome  Followed by dermatology and vein clinic in the past.    Eval for PFO on echo as this may increase risk of stroke particularly in patients with May-Thurner syndrome.      HENRY (obstructive sleep apnea)  On home C Pap and will continue this while in hospital.  Patient reports compliance with CPAP.      HTN (hypertension)  Continue home antihypertensives in consultation with Cardiology.      AMEE (acute kidney injury)  Patient with acute kidney injury likely due to IVVD/dehydration AMEE is currently stable. Labs reviewed- Renal function/electrolytes with Estimated Creatinine Clearance: 87 mL/min (based on SCr of 0.95 mg/dL). according to latest data. Monitor urine output and serial BMP and adjust therapy as needed. Avoid nephrotoxins and renally dose meds for GFR listed above.     AMEE of unclear etiology but may be related to patient's cardiac arrhythmia.  Will continue to monitor tomorrow.  Hydration p.r.n..    Morbid obesity with BMI of 45.0-49.9, adult  Body mass index is 48.26 kg/m². Morbid obesity complicates all aspects of disease " management from diagnostic modalities to treatment. Weight loss encouraged and health benefits explained to patient.         Paroxysmal atrial fibrillation-resolved as of 2/11/2023            Final Active Diagnoses:    Diagnosis Date Noted POA    PRINCIPAL PROBLEM:  Tachyarrhythmia [R00.0] 02/07/2023 Yes    PAC (premature atrial contraction) [I49.1] 04/08/2022 Yes    Morbid obesity with BMI of 45.0-49.9, adult [E66.01, Z68.42] 02/07/2023 Not Applicable    AMEE (acute kidney injury) [N17.9] 02/07/2023 Yes    HTN (hypertension) [I10] 02/07/2023 Yes    HENRY (obstructive sleep apnea) [G47.33] 02/07/2023 Yes    May-Thurner syndrome [I87.1] 02/07/2023 Yes    Moderate persistent asthma without complication [J45.40] 02/07/2023 Yes    GERD (gastroesophageal reflux disease) [K21.9] 02/07/2023 Yes      Problems Resolved During this Admission:    Diagnosis Date Noted Date Resolved POA    Paroxysmal atrial fibrillation [I48.0] 02/09/2023 02/11/2023 Yes       Discharged Condition: fair    Disposition: Home or Self Care    Follow Up:   Follow-up Information     Bony Mary MD Follow up.    Specialty: Family Medicine  Contact information:  [address] .  [facility] [address]  [phone]             PATRICK Villasenor Follow up.    Specialty: Cardiology  Why: follow-up on heart rate and medication optimization.  Contact information:  [address] [place] [address] [employer] [facility] [address]  [phone]                       Patient Instructions:      Diet Adult Regular     Notify your health care provider if you experience any of the following:  temperature >100.4     Notify your health care provider if you experience any of the following:  persistent nausea and vomiting or diarrhea     Notify your health care provider if you experience any of the following:  severe uncontrolled pain     Notify your health care provider if you experience any of  the following:  redness, tenderness, or signs of infection (pain, swelling, redness, odor or green/yellow discharge around incision site)     Notify your health care provider if you experience any of the following:  difficulty breathing or increased cough     Notify your health care provider if you experience any of the following:  severe persistent headache     Notify your health care provider if you experience any of the following:  worsening rash     Notify your health care provider if you experience any of the following:  persistent dizziness, light-headedness, or visual disturbances     Notify your health care provider if you experience any of the following:  increased confusion or weakness     Notify your health care provider if you experience any of the following:     Activity as tolerated   Order Comments: Restrict activities until seen again by cardiology in 1 week.       Significant Diagnostic Studies: Labs:   BMP:   Recent Labs   Lab 02/10/23  0345   GLU 87      K 4.0      CO2 25   BUN 12   CREATININE 0.95   CALCIUM 9.2   MG 2.1    and CBC   Recent Labs   Lab 02/10/23  0345   WBC 5.68   HGB 11.8*   HCT 36.7*          Pending Diagnostic Studies:     Procedure Component Value Units Date/Time    EKG 12-lead [[id number]] Collected: 02/09/23 1158    Order Status: Sent Lab Status: In process Updated: 02/09/23 1203    Narrative:      Test Reason : R07.9,    Vent. Rate : 118 BPM     Atrial Rate : 000 BPM     P-R Int : 000 ms          QRS Dur : 112 ms      QT Int : 326 ms       P-R-T Axes : 000 005 070 degrees     QTc Int : 456 ms    Atrial fibrillation with rapid ventricular response  Abnormal ECG  When compared with ECG of 07-FEB-2023 18:55,  PREVIOUS ECG IS PRESENT    Referred By: [email or web address]   SELF           Confirmed By:     EKG 12-lead [[id number]] Collected: 02/07/23 1855    Order Status: Sent Lab Status: In process Updated: 02/08/23 0608    Narrative:      Test Reason : R07.9,    Vent. Rate  : 075 BPM     Atrial Rate : 000 BPM     P-R Int : 000 ms          QRS Dur : 128 ms      QT Int : 392 ms       P-R-T Axes : 000 056 064 degrees     QTc Int : 418 ms    Atrial flutter  IV conduction defect  Inferior ST elevation  - possible early repolarization  Inferior T wave abnormality  is nonspecific  Abnormal ECG      Referred By: [email or web address]   SELF           Confirmed By:     EKG 12-lead [[id number]] Collected: 02/07/23 1152    Order Status: Sent Lab Status: In process Updated: 02/07/23 1216    Narrative:      Test Reason : I48.92,    Vent. Rate : 124 BPM     Atrial Rate : 000 BPM     P-R Int : 000 ms          QRS Dur : 102 ms      QT Int : 290 ms       P-R-T Axes : 000 024 134 degrees     QTc Int : 402 ms    Atrial flutter  with rapid ventricular response  with 2:1 A-V block  Widespread ST-T abnormality  may be due to myocardial ischemia  Abnormal ECG      Referred By: [email or web address]   SELF           Confirmed By:     EKG 12-lead [[id number]] Collected: 02/07/23 0524    Order Status: Sent Lab Status: In process Updated: 02/07/23 0614    Narrative:      Test Reason : R00.2,    Vent. Rate : 123 BPM     Atrial Rate : 000 BPM     P-R Int : 000 ms          QRS Dur : 098 ms      QT Int : 304 ms       P-R-T Axes : 000 040 069 degrees     QTc Int : 414 ms     CONSIDER ACUTE STEMI   Atrial flutter  with rapid ventricular response  with 2:1 A-V block  Lateral ST elevation , CONSIDER ACUTE INFARCT  Inferior and anterior T wave abnormality  is nonspecific  Abnormal ECG      Referred By: [email or web address]   SELF           Confirmed By:     EKG 12-lead [[id number]] Collected: 02/07/23 0507    Order Status: Sent Lab Status: In process Updated: 02/07/23 0614    Narrative:      Test Reason : R00.2,    Vent. Rate : 128 BPM     Atrial Rate : 000 BPM     P-R Int : 000 ms          QRS Dur : 092 ms      QT Int : 280 ms       P-R-T Axes : 000 041 091 degrees     QTc Int : 399 ms    Possible atrial flutter  with rapid ventricular response  Widespread  T wave abnormality  is nonspecific  Abnormal ECG      Referred By: [email or web address]   SELF           Confirmed By:     EKG 12-lead [[id number]] Collected: 02/07/23 0451    Order Status: Sent Lab Status: In process Updated: 02/07/23 0614    Narrative:      Test Reason : R00.2,    Vent. Rate : 244 BPM     Atrial Rate : 000 BPM     P-R Int : 000 ms          QRS Dur : 092 ms      QT Int : 224 ms       P-R-T Axes : 000 061 253 degrees     QTc Int : 546 ms     EXTREME TACHYCARDIA   Probable atrial fibrillation  with uncontrolled ventricular response  Widespread ST-T abnormality  may be due to myocardial ischemia  Abnormal ECG      Referred By: [email or web address]   SELF           Confirmed By:     EKG 12-lead [[id number]] Collected: 02/07/23 0444    Order Status: Sent Lab Status: In process Updated: 02/07/23 0554    Narrative:      Test Reason : R00.2,    Vent. Rate : 240 BPM     Atrial Rate : 000 BPM     P-R Int : 000 ms          QRS Dur : 094 ms      QT Int : 232 ms       P-R-T Axes : 000 065 243 degrees     QTc Int : 547 ms     EXTREME TACHYCARDIA   Atrial flutter  with uncontrolled ventricular response  Widespread ST-T abnormality  may be due to myocardial ischemia  Abnormal ECG      Referred By: AAAREFJUANA   SELF           Confirmed By:     EXTRA TUBES [[id number]] Collected: 02/07/23 0455    Order Status: Sent Lab Status: In process Updated: 02/07/23 0525    Specimen: Blood, Venous     Narrative:      The following orders were created for panel order EXTRA TUBES.  Procedure                               Abnormality         Status                     ---------                               -----------         ------                     Light Blue Top Hold[[id number]]                              In process                 Light Green Top Hold[[id number]]                                                        Lavender Top Hold[[id number]]                                                           Gold Top Hold[[id number]]                                                                  Please view results for these tests on the individual orders.    Urinalysis, Reflex to Urine Culture [[id number]]     Order Status: Sent Lab Status: No result     Specimen: Urine          Medications:  Reconciled Home Medications:      Medication List      START taking these medications    * amiodarone 400 MG tablet  Commonly known as: PACERONE  Take 1 tablet (400 mg total) by mouth 2 (two) times daily.     * amiodarone 200 MG Tab  Commonly known as: PACERONE  Take 1 tablet (200 mg total) by mouth once daily.     apixaban 5 mg Tab  Commonly known as: ELIQUIS  Take 1 tablet (5 mg total) by mouth 2 (two) times daily.     digoxin 125 mcg tablet  Commonly known as: LANOXIN  Take 1 tablet (0.125 mg total) by mouth once daily.  Start taking on: February 12, 2023     metoprolol succinate 25 MG 24 hr tablet  Commonly known as: TOPROL-XL  Take 1 tablet (25 mg total) by mouth once daily.         * This list has 2 medication(s) that are the same as other medications prescribed for you. Read the directions carefully, and ask your doctor or other care provider to review them with you.            CONTINUE taking these medications    furosemide 20 MG tablet  Commonly known as: LASIX  Take 1 tablet (20 mg total) by mouth once daily.        STOP taking these medications    albuterol 90 mcg/actuation inhaler  Commonly known as: PROVENTIL/VENTOLIN HFA     carvediloL 12.5 MG tablet  Commonly known as: COREG     cloNIDine 0.1 MG tablet  Commonly known as: CATAPRES     olmesartan 40 MG tablet  Commonly known as: BENICAR     pantoprazole 40 MG tablet  Commonly known as: PROTONIX     SYMBICORT 160-4.5 mcg/actuation Hfaa  Generic drug: budesonide-formoterol 160-4.5 mcg            Indwelling Lines/Drains at time of discharge:   Lines/Drains/Airways     None                 Time spent on the discharge of patient: 47   minutes         George Berry MD  Department of Hospital Medicine  [facility]

## 2023-02-11 NOTE — ASSESSMENT & PLAN NOTE
Suspect atrial flutter was the original rhythm.    Multiple medications were given and patient finally controlled with diltiazem drip at 15 and digoxin.  TSH normal.  Obesity and sleep apnea or risk factors as well as asthma medications.  Cardiology consulted given difficulty controlling initially and new onset.    Therapeutic Lovenox per recommendation of Cardiology.  Continue diltiazem drip.  Note that she was loaded with digoxin and may continue this tomorrow in consultation with Cardiology.    2/8:  PHILIPPE and possible cardioversion today.    2/11: Discharge on amiodarone 40 mg b.i.d., digoxin 125 mcg daily, Toprol 50mg daily per recommendation of cardiology.

## 2023-02-11 NOTE — ASSESSMENT & PLAN NOTE
Patient with acute kidney injury likely due to IVVD/dehydration AMEE is currently stable. Labs reviewed- Renal function/electrolytes with Estimated Creatinine Clearance: 87 mL/min (based on SCr of 0.95 mg/dL). according to latest data. Monitor urine output and serial BMP and adjust therapy as needed. Avoid nephrotoxins and renally dose meds for GFR listed above.     AMEE of unclear etiology but may be related to patient's cardiac arrhythmia.  Will continue to monitor tomorrow.  Hydration p.r.n..

## 2023-02-11 NOTE — DISCHARGE SUMMARY
[facility]  [facility] Medicine  Discharge Summary      Patient Name: Kat Nam  MRN: [id number]  EDINSON: [id number]  Patient Class: IP- Inpatient  Admission Date: 2/7/2023  Hospital Length of Stay: 1 days  Discharge Date and Time:  02/11/2023 5:18 PM  Attending Physician: George Berry MD   Discharging Provider: George Berry MD  Primary Care Provider: Boyn Mary MD    Primary Care Team: Networked reference to record PCT     HPI:   Ms. Nam is a 61yo woman h/o asthma, GERD, HTN, HLD, Class 3 obesity, HENRY on CPAP, premature atrial contractions with bigeminy, May-Thurner syndrome who presents with progressively worsening palpitations and shortness of breath.  She describes the palpitations or chest as feeling like bubbles.  She states that the bubbles have been more frequent over the last few days over the last 3 weeks.      She states that she works 7 on 7 off during the night shift in the [employer] and this alters her sleep cycle.  She thought that these palpitations were relatively normal given her abnormal sleep patterns.  Furthermore she is had palpitations in the past.  However she now has shortness of breath so this has become more concerning for her.  She denies any chest pain.  She notes that she intermittently has lower extremity swelling but she has May Thurner syndrome and she has had discolorations and intermittent swelling of her lower extremities.  She states she was seen by Dr. Marcos for PACs and there were no changes in her management.        ER course:   Patient presented with pulse of 245 and blood pressure 238/59.  She was given multiple medications as detailed below.  She experienced a pause after having adenosine but did not reveal the underlying rhythm.  There were flutter waves present.    Initial Vitals   BP Pulse Resp Temp SpO2   02/07/23 0444 02/07/23 0442 02/07/23 0442 02/07/23 0442 02/07/23 0442   (!) 238/59 (!) 245 (!) 26 97.8 °F  "(36.6 °C) 98      Medications   diltiaZEM (CARDIZEM) 125 mg in dextrose 5 % (D5W) 125 mL infusion (15 mg/hr Intravenous Rate/Dose Change 2/7/23 0709)   digoxin injection 500 mcg (has no administration in time range)   metoprolol injection 5 mg (has no administration in time range)   metoprolol injection 5 mg (5 mg Intravenous Given 2/7/23 0530)   sodium chloride 0.9% bolus 1,000 mL 1,000 mL (0 mLs Intravenous Stopped 2/7/23 0728)   adenosine injection 12 mg (12 mg Intravenous Given 2/7/23 0530)   adenosine injection 6 mg (6 mg Intravenous Given 2/7/23 0530)   metoprolol injection 5 mg (5 mg Intravenous Given 2/7/23 0531)       HPI per cardiology team:     Gigi is a 62 y.o.f. with hx of asthma (reports daily symbicort use), GERD, hypertension, hyperlipidemia, obesity, sleep apnea (compliant with nightly CPAP) and May-Thurner Syndrome presenting with palpitations. Pt's daughter and  are in the room at the time of my exam. Pt works as an employee with the [employer] and felt a sensation which she describes as like having "bubbles" in her chest last night around 0400. 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 Pt has been experiencing similar episodes of a "bubble" sensation in her chest for the last few weeks. Each episode has lasted approx. 10-15 minutes and is accompanied by weakness. Pt reports some SOB, diaphoresis, and a sensation of near syncope but denies chest pain, visual disturbances, fevers, chills and nausea.      Of note, Pt was seen by Dr. Marcos in April of last year. Pt was found to have a frequent PAC burden but no evidence of Afib. Echo and stress test which were performed in early 2020 were both documented to be unremarkable.    Workup thus far is notable for the following: CXR reveals no acute cardiopulmonary process. Latest EKG reveals atrial flutter with a rate of 124. Troponins were negative for acute ischemia x " 2. NT-proBNP was 1447.  Potassium was mildly elevated at 5.2. Magnesium was wnl. TSH was wnl. H/H were wnl.  In the ED, Pt was initially treated with 6mg then 12mg of adenosine with brief approx. 10 second conversion to sinus rhythm. Her HR then increased back to 245 bpm. Pt was subsequently treated with IV Lopressor as well as Cardizem infusion and 1 dose of digoxin with successful reduction of her rate.     Cardiology has been consulted for evaluation and management of A-flutter.          Procedure(s) (LRB):  Transesophageal echo (PHILIPPE) intra-procedure log documentation (N/A)  Cardioversion or Defibrillation (N/A)      Hospital Course:   2/8:  Plan for PHILIPPE with possible cardioversion today.    2/9:  Plan to switch to p.o. medication anticipate discharge tomorrow.    2/11:  Before discharge per Cardiology.  Will discharge on amiodarone 40 mg b.i.d., digoxin 125 mcg daily, metoprolol 50 mg b.i.d..       Goals of Care Treatment Preferences:  Code Status: Full Code      Consults:   Consults (From admission, onward)        Status Ordering Provider     Inpatient consult to Cardiology  Once        Provider:  DO Natan Duncan CHARANDLE S.          * Tachyarrhythmia  Suspect atrial flutter was the original rhythm.    Multiple medications were given and patient finally controlled with diltiazem drip at 15 and digoxin.  TSH normal.  Obesity and sleep apnea or risk factors as well as asthma medications.  Cardiology consulted given difficulty controlling initially and new onset.    Therapeutic Lovenox per recommendation of Cardiology.  Continue diltiazem drip.  Note that she was loaded with digoxin and may continue this tomorrow in consultation with Cardiology.    2/8:  PHILIPPE and possible cardioversion today.    2/11: Discharge on Amiodarone 400mg bid for two weeks, then 200mg daily, digoxin 125 mcg daily, Toprol 50mg daily per recommendation of cardiology.           Final Active Diagnoses:    Diagnosis  Date Noted POA    PRINCIPAL PROBLEM:  Tachyarrhythmia [R00.0] 02/07/2023 Yes    PAC (premature atrial contraction) [I49.1] 04/08/2022 Yes    Morbid obesity with BMI of 45.0-49.9, adult [E66.01, Z68.42] 02/07/2023 Not Applicable    AMEE (acute kidney injury) [N17.9] 02/07/2023 Yes    HTN (hypertension) [I10] 02/07/2023 Yes    HENRY (obstructive sleep apnea) [G47.33] 02/07/2023 Yes    May-Thurner syndrome [I87.1] 02/07/2023 Yes    Moderate persistent asthma without complication [J45.40] 02/07/2023 Yes    GERD (gastroesophageal reflux disease) [K21.9] 02/07/2023 Yes      Problems Resolved During this Admission:    Diagnosis Date Noted Date Resolved POA    Paroxysmal atrial fibrillation [I48.0] 02/09/2023 02/11/2023 Yes       Discharged Condition: fair    Disposition: Home or Self Care    Follow Up:   Follow-up Information     Bony Mary MD Follow up.    Specialty: Family Medicine  Contact information:  [address] [facility].  [facility] [address]  [phone]             PATRICK Villasenor Follow up.    Specialty: Cardiology  Why: follow-up on heart rate and medication optimization.  Contact information:  [address] [place] [address] [facility] [address]  [phone]                       Patient Instructions:      Diet Adult Regular     Notify your health care provider if you experience any of the following:  temperature >100.4     Notify your health care provider if you experience any of the following:  persistent nausea and vomiting or diarrhea     Notify your health care provider if you experience any of the following:  severe uncontrolled pain     Notify your health care provider if you experience any of the following:  redness, tenderness, or signs of infection (pain, swelling, redness, odor or green/yellow discharge around incision site)     Notify your health care provider if you experience any of the following:  difficulty  breathing or increased cough     Notify your health care provider if you experience any of the following:  severe persistent headache     Notify your health care provider if you experience any of the following:  worsening rash     Notify your health care provider if you experience any of the following:  persistent dizziness, light-headedness, or visual disturbances     Notify your health care provider if you experience any of the following:  increased confusion or weakness     Notify your health care provider if you experience any of the following:     Activity as tolerated   Order Comments: Restrict activities until seen again by cardiology in 1 week.       Significant Diagnostic Studies: Labs:   BMP:   Recent Labs   Lab 02/10/23  0345   GLU 87      K 4.0      CO2 25   BUN 12   CREATININE 0.95   CALCIUM 9.2   MG 2.1    and CBC   Recent Labs   Lab 02/10/23  0345   WBC 5.68   HGB 11.8*   HCT 36.7*          Pending Diagnostic Studies:     Procedure Component Value Units Date/Time    EKG 12-lead [[id number]] Collected: 02/09/23 1158    Order Status: Sent Lab Status: In process Updated: 02/09/23 1203    Narrative:      Test Reason : R07.9,    Vent. Rate : 118 BPM     Atrial Rate : 000 BPM     P-R Int : 000 ms          QRS Dur : 112 ms      QT Int : 326 ms       P-R-T Axes : 000 005 070 degrees     QTc Int : 456 ms    Atrial fibrillation with rapid ventricular response  Abnormal ECG  When compared with ECG of 07-FEB-2023 18:55,  PREVIOUS ECG IS PRESENT    Referred By: [email or web address]   SELF           Confirmed By:     EKG 12-lead [[id number]] Collected: 02/07/23 1855    Order Status: Sent Lab Status: In process Updated: 02/08/23 0608    Narrative:      Test Reason : R07.9,    Vent. Rate : 075 BPM     Atrial Rate : 000 BPM     P-R Int : 000 ms          QRS Dur : 128 ms      QT Int : 392 ms       P-R-T Axes : 000 056 064 degrees     QTc Int : 418 ms    Atrial flutter  IV conduction defect  Inferior ST elevation   - possible early repolarization  Inferior T wave abnormality  is nonspecific  Abnormal ECG      Referred By: [email or web address]   SELF           Confirmed By:     EKG 12-lead [[id number]] Collected: 02/07/23 1152    Order Status: Sent Lab Status: In process Updated: 02/07/23 1216    Narrative:      Test Reason : I48.92,    Vent. Rate : 124 BPM     Atrial Rate : 000 BPM     P-R Int : 000 ms          QRS Dur : 102 ms      QT Int : 290 ms       P-R-T Axes : 000 024 134 degrees     QTc Int : 402 ms    Atrial flutter  with rapid ventricular response  with 2:1 A-V block  Widespread ST-T abnormality  may be due to myocardial ischemia  Abnormal ECG      Referred By: [email or web address]   SELF           Confirmed By:     EKG 12-lead [[id number]] Collected: 02/07/23 0524    Order Status: Sent Lab Status: In process Updated: 02/07/23 0614    Narrative:      Test Reason : R00.2,    Vent. Rate : 123 BPM     Atrial Rate : 000 BPM     P-R Int : 000 ms          QRS Dur : 098 ms      QT Int : 304 ms       P-R-T Axes : 000 040 069 degrees     QTc Int : 414 ms     CONSIDER ACUTE STEMI   Atrial flutter  with rapid ventricular response  with 2:1 A-V block  Lateral ST elevation , CONSIDER ACUTE INFARCT  Inferior and anterior T wave abnormality  is nonspecific  Abnormal ECG      Referred By: [email or web address]   SELF           Confirmed By:     EKG 12-lead [[id number]] Collected: 02/07/23 0507    Order Status: Sent Lab Status: In process Updated: 02/07/23 0614    Narrative:      Test Reason : R00.2,    Vent. Rate : 128 BPM     Atrial Rate : 000 BPM     P-R Int : 000 ms          QRS Dur : 092 ms      QT Int : 280 ms       P-R-T Axes : 000 041 091 degrees     QTc Int : 399 ms    Possible atrial flutter  with rapid ventricular response  Widespread T wave abnormality  is nonspecific  Abnormal ECG      Referred By: [email or web address]   SELF           Confirmed By:     EKG 12-lead [[id number]] Collected: 02/07/23 0451    Order Status: Sent Lab Status: In process Updated: 02/07/23 0614     Narrative:      Test Reason : R00.2,    Vent. Rate : 244 BPM     Atrial Rate : 000 BPM     P-R Int : 000 ms          QRS Dur : 092 ms      QT Int : 224 ms       P-R-T Axes : 000 061 253 degrees     QTc Int : 546 ms     EXTREME TACHYCARDIA   Probable atrial fibrillation  with uncontrolled ventricular response  Widespread ST-T abnormality  may be due to myocardial ischemia  Abnormal ECG      Referred By: [email or web address]   SELF           Confirmed By:     EKG 12-lead [[id number]] Collected: 02/07/23 0444    Order Status: Sent Lab Status: In process Updated: 02/07/23 0554    Narrative:      Test Reason : R00.2,    Vent. Rate : 240 BPM     Atrial Rate : 000 BPM     P-R Int : 000 ms          QRS Dur : 094 ms      QT Int : 232 ms       P-R-T Axes : 000 065 243 degrees     QTc Int : 547 ms     EXTREME TACHYCARDIA   Atrial flutter  with uncontrolled ventricular response  Widespread ST-T abnormality  may be due to myocardial ischemia  Abnormal ECG      Referred By: [email or web address]   SELF           Confirmed By:     EXTRA TUBES [[id number]] Collected: 02/07/23 0455    Order Status: Sent Lab Status: In process Updated: 02/07/23 0525    Specimen: Blood, Venous     Narrative:      The following orders were created for panel order EXTRA TUBES.  Procedure                               Abnormality         Status                     ---------                               -----------         ------                     Light Blue Top Hold[[id number]]                              In process                 Light Green Top Hold[[id number]]                                                        Lavender Top Hold[[id number]]                                                           Gold Top Hold[[id number]]                                                                 Please view results for these tests on the individual orders.    Urinalysis, Reflex to Urine Culture [[id number]]     Order Status: Sent Lab Status: No result     Specimen: Urine           Medications:  Reconciled Home Medications:      Medication List      START taking these medications    * amiodarone 400 MG tablet  Commonly known as: PACERONE  Take 1 tablet (400 mg total) by mouth 2 (two) times daily.     * amiodarone 200 MG Tab  Commonly known as: PACERONE  Take 1 tablet (200 mg total) by mouth once daily.     apixaban 5 mg Tab  Commonly known as: ELIQUIS  Take 1 tablet (5 mg total) by mouth 2 (two) times daily.     digoxin 125 mcg tablet  Commonly known as: LANOXIN  Take 1 tablet (0.125 mg total) by mouth once daily.  Start taking on: February 12, 2023     * metoprolol succinate 25 MG 24 hr tablet  Commonly known as: TOPROL-XL  Take 1 tablet (25 mg total) by mouth once daily.     * metoprolol succinate 50 MG 24 hr tablet  Commonly known as: TOPROL-XL  Take 1 tablet (50 mg total) by mouth once daily.     * metoprolol succinate 50 MG 24 hr tablet  Commonly known as: TOPROL-XL  Take 1 tablet (50 mg total) by mouth once daily.         * This list has 5 medication(s) that are the same as other medications prescribed for you. Read the directions carefully, and ask your doctor or other care provider to review them with you.            CONTINUE taking these medications    furosemide 20 MG tablet  Commonly known as: LASIX  Take 1 tablet (20 mg total) by mouth once daily.        STOP taking these medications    albuterol 90 mcg/actuation inhaler  Commonly known as: PROVENTIL/VENTOLIN HFA     carvediloL 12.5 MG tablet  Commonly known as: COREG     cloNIDine 0.1 MG tablet  Commonly known as: CATAPRES     olmesartan 40 MG tablet  Commonly known as: BENICAR     pantoprazole 40 MG tablet  Commonly known as: PROTONIX     SYMBICORT 160-4.5 mcg/actuation Hfaa  Generic drug: budesonide-formoterol 160-4.5 mcg            Indwelling Lines/Drains at time of discharge:   Lines/Drains/Airways     None                 Time spent on the discharge of patient: 47 minutes         George Berry MD  Department of  [facility] Medicine  [facility]

## 2023-02-11 NOTE — NURSING
Patient d/c, taken out via wheelchair by pct staff.  with patient, discharge packet with them and they have been educated on new orders.

## 2023-02-11 NOTE — ASSESSMENT & PLAN NOTE
Suspect atrial flutter was the original rhythm.    Multiple medications were given and patient finally controlled with diltiazem drip at 15 and digoxin.  TSH normal.  Obesity and sleep apnea or risk factors as well as asthma medications.  Cardiology consulted given difficulty controlling initially and new onset.    Therapeutic Lovenox per recommendation of Cardiology.  Continue diltiazem drip.  Note that she was loaded with digoxin and may continue this tomorrow in consultation with Cardiology.    2/8:  PHILIPPE and possible cardioversion today.    2/11: Discharge on Amiodarone 400mg bid for two weeks, then 200mg daily, digoxin 125 mcg daily, Toprol 50mg daily per recommendation of cardiology.

## 2023-02-12 NOTE — PROGRESS NOTES
[facility]  [facility] Medicine  Progress Note    Patient Name: Kat Nam  MRN: [id number]  Patient Class: IP- Inpatient   Admission Date: 2/7/2023  Length of Stay: 1 days  Attending Physician: No att. providers found  Primary Care Provider: Bony Mary MD        Subjective:     Principal Problem:Tachyarrhythmia    HPI:  Ms. Nam is a 61yo woman h/o asthma, GERD, HTN, HLD, Class 3 obesity, HENRY on CPAP, premature atrial contractions with bigeminy, May-Thurner syndrome who presents with progressively worsening palpitations and shortness of breath.  She describes the palpitations or chest as feeling like bubbles.  She states that the bubbles have been more frequent over the last few days over the last 3 weeks.      She states that she works 7 on 7 off during the night shift in the [employer] and this alters her sleep cycle.  She thought that these palpitations were relatively normal given her abnormal sleep patterns.  Furthermore she is had palpitations in the past.  However she now has shortness of breath so this has become more concerning for her.  She denies any chest pain.  She notes that she intermittently has lower extremity swelling but she has May Thurner syndrome and she has had discolorations and intermittent swelling of her lower extremities.  She states she was seen by Dr. Marcos for PACs and there were no changes in her management.        ER course:   Patient presented with pulse of 245 and blood pressure 238/59.  She was given multiple medications as detailed below.  She experienced a pause after having adenosine but did not reveal the underlying rhythm.  There were flutter waves present.    Initial Vitals   BP Pulse Resp Temp SpO2   02/07/23 0444 02/07/23 0442 02/07/23 0442 02/07/23 0442 02/07/23 0442   (!) 238/59 (!) 245 (!) 26 97.8 °F (36.6 °C) 98      Medications   diltiaZEM (CARDIZEM) 125 mg in dextrose 5 % (D5W) 125 mL infusion (15 mg/hr Intravenous  "Rate/Dose Change 2/7/23 0709)   digoxin injection 500 mcg (has no administration in time range)   metoprolol injection 5 mg (has no administration in time range)   metoprolol injection 5 mg (5 mg Intravenous Given 2/7/23 0530)   sodium chloride 0.9% bolus 1,000 mL 1,000 mL (0 mLs Intravenous Stopped 2/7/23 0728)   adenosine injection 12 mg (12 mg Intravenous Given 2/7/23 0530)   adenosine injection 6 mg (6 mg Intravenous Given 2/7/23 0530)   metoprolol injection 5 mg (5 mg Intravenous Given 2/7/23 0531)       HPI per cardiology team:     Pt is a 62 y.o.f. with hx of asthma (reports daily symbicort use), GERD, hypertension, hyperlipidemia, obesity, sleep apnea (compliant with nightly CPAP) and May-Thurner Syndrome presenting with palpitations. Pt's daughter and  are in the room at the time of my exam. Pt works as an employee with the [employer] and felt a sensation which she describes as like having "bubbles" in her chest last night around 0400. 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 Pt has been experiencing similar episodes of a "bubble" sensation in her chest for the last few weeks. Each episode has lasted approx. 10-15 minutes and is accompanied by weakness. Pt reports some SOB, diaphoresis, and a sensation of near syncope but denies chest pain, visual disturbances, fevers, chills and nausea.      Of note, Pt was seen by Dr. Marcos in April of last year. Pt was found to have a frequent PAC burden but no evidence of Afib. Echo and stress test which were performed in early 2020 were both documented to be unremarkable.    Workup thus far is notable for the following: CXR reveals no acute cardiopulmonary process. Latest EKG reveals atrial flutter with a rate of 124. Troponins were negative for acute ischemia x 2. NT-proBNP was 1447.  Potassium was mildly elevated at 5.2. Magnesium was wnl. TSH was wnl. H/H were wnl.  In the ED, " Pt was initially treated with 6mg then 12mg of adenosine with brief approx. 10 second conversion to sinus rhythm. Her HR then increased back to 245 bpm. Pt was subsequently treated with IV Lopressor as well as Cardizem infusion and 1 dose of digoxin with successful reduction of her rate.     Cardiology has been consulted for evaluation and management of A-flutter.          Overview/Hospital Course:  2/8:  Plan for PHILIPPE with possible cardioversion today.    2/9:  Plan to switch to p.o. medication anticipate discharge tomorrow.    2/10: With ambulation, tachycardia to 140s to 150s.  Cardiology to further adjust medications.        Interval History:    Review of Systems   Constitutional:  Negative for activity change, chills, fatigue and fever.   HENT:  Negative for congestion and sore throat.    Respiratory:  Positive for shortness of breath. Negative for chest tightness.    Cardiovascular:  Positive for palpitations.   Gastrointestinal:  Negative for abdominal distention, abdominal pain and diarrhea.   Endocrine: Negative for cold intolerance and heat intolerance.   Genitourinary:  Negative for dysuria.   Musculoskeletal:  Negative for arthralgias and back pain.   Skin:  Negative for color change and rash.   Neurological:  Negative for dizziness, tremors and headaches.   Psychiatric/Behavioral:  Negative for agitation. The patient is not nervous/anxious.    Objective:     Vital Signs (Most Recent):  Temp: 97.5 °F (36.4 °C) (02/11/23 1544)  Pulse: 101 (02/11/23 1544)  Resp: 16 (02/10/23 2000)  BP: (!) 141/67 (02/11/23 1544)  SpO2: 98 % (02/11/23 1544)   Vital Signs (24h Range):  Temp:  [97.5 °F (36.4 °C)-98.1 °F (36.7 °C)] 97.5 °F (36.4 °C)  Pulse:  [] 101  SpO2:  [96 %-98 %] 98 %  BP: (118-141)/(50-71) 141/67     Weight: 135.6 kg (299 lb)  Body mass index is 48.26 kg/m².  No intake or output data in the 24 hours ending 02/11/23 2231   Physical Exam  Constitutional:       Appearance: Normal appearance.   HENT:       Head: Normocephalic and atraumatic.      Nose: Nose normal.      Mouth/Throat:      Mouth: Mucous membranes are moist.      Pharynx: Oropharynx is clear.   Eyes:      Extraocular Movements: Extraocular movements intact.      Conjunctiva/sclera: Conjunctivae normal.      Pupils: Pupils are equal, round, and reactive to light.   Cardiovascular:      Rate and Rhythm: Normal rate and regular rhythm.      Pulses: Normal pulses.      Heart sounds: Normal heart sounds.   Pulmonary:      Effort: Pulmonary effort is normal.      Breath sounds: Normal breath sounds.   Abdominal:      General: Abdomen is flat. Bowel sounds are normal.      Palpations: Abdomen is soft.   Musculoskeletal:         General: Normal range of motion.      Cervical back: Normal range of motion and neck supple.   Skin:     General: Skin is warm and dry.   Neurological:      General: No focal deficit present.      Mental Status: She is alert and oriented to person, place, and time.   Psychiatric:         Mood and Affect: Mood normal.         Behavior: Behavior normal.       Significant Labs: All pertinent labs within the past 24 hours have been reviewed.    Significant Imaging: I have reviewed all pertinent imaging results/findings within the past 24 hours.      Assessment/Plan:      * Tachyarrhythmia  Suspect atrial flutter was the original rhythm.    Multiple medications were given and patient finally controlled with diltiazem drip at 15 and digoxin.  TSH normal.  Obesity and sleep apnea or risk factors as well as asthma medications.  Cardiology consulted given difficulty controlling initially and new onset.    Therapeutic Lovenox per recommendation of Cardiology.  Continue diltiazem drip.  Note that she was loaded with digoxin and may continue this tomorrow in consultation with Cardiology.    2/8:  PHILIPPE and possible cardioversion today.    2/11: Discharge on Amiodarone 400mg bid for two weeks, then 200mg daily, digoxin 125 mcg daily, Toprol 50mg  "daily per recommendation of cardiology.         PAC (premature atrial contraction)  History of PACs and palpitations for over 10 years and seen by Dr. Marcos April 2022.    Prior plan as detailed below:   "Echo and stress test were done in early 2020 which were both unremarkable.  EKG done yesterday and in office today shows very frequent PACs, atrial bigeminy  Patient is otherwise asymptomatic, denies any dizziness or syncope.  She does feel her heart quiver however denies any fast heart rates.  Will get a Holter monitor to assess her PAC burden.  At this point she does not have any other symptoms, which is continue giving her carvedilol.  If she becomes symptomatic can consider EP evaluation.  Holter monitor   Discontinue Eliquis, no evidence of atrial fibrillation."      GERD (gastroesophageal reflux disease)  Holding Protonix for now.      Moderate persistent asthma without complication  She does not appear to be an acute exacerbation at this time.    Will not order DuoNebs.        May-Thurner syndrome  Followed by dermatology and vein clinic in the past.    Eval for PFO on echo as this may increase risk of stroke particularly in patients with May-Thurner syndrome.      HENRY (obstructive sleep apnea)  On home C Pap and will continue this while in hospital.  Patient reports compliance with CPAP.      HTN (hypertension)  Continue home antihypertensives in consultation with Cardiology.      AMEE (acute kidney injury)  Patient with acute kidney injury likely due to IVVD/dehydration AMEE is currently stable. Labs reviewed- Renal function/electrolytes with Estimated Creatinine Clearance: 87 mL/min (based on SCr of 0.95 mg/dL). according to latest data. Monitor urine output and serial BMP and adjust therapy as needed. Avoid nephrotoxins and renally dose meds for GFR listed above.     AMEE of unclear etiology but may be related to patient's cardiac arrhythmia.  Will continue to monitor tomorrow.  Hydration p.r.n..    Morbid " obesity with BMI of 45.0-49.9, adult  Body mass index is 48.26 kg/m². Morbid obesity complicates all aspects of disease management from diagnostic modalities to treatment. Weight loss encouraged and health benefits explained to patient.           VTE Risk Mitigation (From admission, onward)         Ordered     IP VTE HIGH RISK PATIENT  Once         02/07/23 0831                Discharge Planning   CASA: 2/11/2023     Code Status: Prior   Is the patient medically ready for discharge?:     Reason for patient still in hospital (select all that apply): Treatment  Discharge Plan A: Home with family                  George Berry MD  Department of Hospital Medicine   [facility]

## 2023-02-12 NOTE — SUBJECTIVE & OBJECTIVE
Interval History:    Review of Systems   Constitutional:  Negative for activity change, chills, fatigue and fever.   HENT:  Negative for congestion and sore throat.    Respiratory:  Positive for shortness of breath. Negative for chest tightness.    Cardiovascular:  Positive for palpitations.   Gastrointestinal:  Negative for abdominal distention, abdominal pain and diarrhea.   Endocrine: Negative for cold intolerance and heat intolerance.   Genitourinary:  Negative for dysuria.   Musculoskeletal:  Negative for arthralgias and back pain.   Skin:  Negative for color change and rash.   Neurological:  Negative for dizziness, tremors and headaches.   Psychiatric/Behavioral:  Negative for agitation. The patient is not nervous/anxious.    Objective:     Vital Signs (Most Recent):  Temp: 97.5 °F (36.4 °C) (02/11/23 1544)  Pulse: 101 (02/11/23 1544)  Resp: 16 (02/10/23 2000)  BP: (!) 141/67 (02/11/23 1544)  SpO2: 98 % (02/11/23 1544)   Vital Signs (24h Range):  Temp:  [97.5 °F (36.4 °C)-98.1 °F (36.7 °C)] 97.5 °F (36.4 °C)  Pulse:  [] 101  SpO2:  [96 %-98 %] 98 %  BP: (118-141)/(50-71) 141/67     Weight: 135.6 kg (299 lb)  Body mass index is 48.26 kg/m².  No intake or output data in the 24 hours ending 02/11/23 2231   Physical Exam  Constitutional:       Appearance: Normal appearance.   HENT:      Head: Normocephalic and atraumatic.      Nose: Nose normal.      Mouth/Throat:      Mouth: Mucous membranes are moist.      Pharynx: Oropharynx is clear.   Eyes:      Extraocular Movements: Extraocular movements intact.      Conjunctiva/sclera: Conjunctivae normal.      Pupils: Pupils are equal, round, and reactive to light.   Cardiovascular:      Rate and Rhythm: Normal rate and regular rhythm.      Pulses: Normal pulses.      Heart sounds: Normal heart sounds.   Pulmonary:      Effort: Pulmonary effort is normal.      Breath sounds: Normal breath sounds.   Abdominal:      General: Abdomen is flat. Bowel sounds are normal.       Palpations: Abdomen is soft.   Musculoskeletal:         General: Normal range of motion.      Cervical back: Normal range of motion and neck supple.   Skin:     General: Skin is warm and dry.   Neurological:      General: No focal deficit present.      Mental Status: She is alert and oriented to person, place, and time.   Psychiatric:         Mood and Affect: Mood normal.         Behavior: Behavior normal.       Significant Labs: All pertinent labs within the past 24 hours have been reviewed.    Significant Imaging: I have reviewed all pertinent imaging results/findings within the past 24 hours.

## 2023-02-20 ENCOUNTER — OFFICE VISIT (OUTPATIENT)
Dept: CARDIOLOGY | Facility: CLINIC | Age: 63
End: 2023-02-20
Payer: COMMERCIAL

## 2023-02-20 VITALS
DIASTOLIC BLOOD PRESSURE: 80 MMHG | BODY MASS INDEX: 47.09 KG/M2 | HEIGHT: 66 IN | WEIGHT: 293 LBS | SYSTOLIC BLOOD PRESSURE: 138 MMHG | OXYGEN SATURATION: 99 % | HEART RATE: 110 BPM

## 2023-02-20 DIAGNOSIS — E66.01 MORBID OBESITY: ICD-10-CM

## 2023-02-20 DIAGNOSIS — R00.0 TACHYARRHYTHMIA: Primary | ICD-10-CM

## 2023-02-20 DIAGNOSIS — I48.91 ATRIAL FIBRILLATION, UNSPECIFIED TYPE: ICD-10-CM

## 2023-02-20 DIAGNOSIS — G47.33 OBSTRUCTIVE SLEEP APNEA OF ADULT: ICD-10-CM

## 2023-02-20 PROCEDURE — 3008F PR BODY MASS INDEX (BMI) DOCUMENTED: ICD-10-PCS | Mod: ,,, | Performed by: INTERNAL MEDICINE

## 2023-02-20 PROCEDURE — 1160F PR REVIEW ALL MEDS BY PRESCRIBER/CLIN PHARMACIST DOCUMENTED: ICD-10-PCS | Mod: ,,, | Performed by: INTERNAL MEDICINE

## 2023-02-20 PROCEDURE — 3079F DIAST BP 80-89 MM HG: CPT | Mod: ,,, | Performed by: INTERNAL MEDICINE

## 2023-02-20 PROCEDURE — 3075F PR MOST RECENT SYSTOLIC BLOOD PRESS GE 130-139MM HG: ICD-10-PCS | Mod: ,,, | Performed by: INTERNAL MEDICINE

## 2023-02-20 PROCEDURE — 93010 EKG 12-LEAD: ICD-10-PCS | Mod: S$PBB,,, | Performed by: INTERNAL MEDICINE

## 2023-02-20 PROCEDURE — 99214 OFFICE O/P EST MOD 30 MIN: CPT | Mod: S$PBB,,, | Performed by: INTERNAL MEDICINE

## 2023-02-20 PROCEDURE — 93005 ELECTROCARDIOGRAM TRACING: CPT | Mod: PBBFAC | Performed by: INTERNAL MEDICINE

## 2023-02-20 PROCEDURE — 93010 ELECTROCARDIOGRAM REPORT: CPT | Mod: S$PBB,,, | Performed by: INTERNAL MEDICINE

## 2023-02-20 PROCEDURE — 1111F PR DISCHARGE MEDS RECONCILED W/ CURRENT OUTPATIENT MED LIST: ICD-10-PCS | Mod: ,,, | Performed by: INTERNAL MEDICINE

## 2023-02-20 PROCEDURE — 3075F SYST BP GE 130 - 139MM HG: CPT | Mod: ,,, | Performed by: INTERNAL MEDICINE

## 2023-02-20 PROCEDURE — 1160F RVW MEDS BY RX/DR IN RCRD: CPT | Mod: ,,, | Performed by: INTERNAL MEDICINE

## 2023-02-20 PROCEDURE — 99214 OFFICE O/P EST MOD 30 MIN: CPT | Mod: PBBFAC | Performed by: INTERNAL MEDICINE

## 2023-02-20 PROCEDURE — 1111F DSCHRG MED/CURRENT MED MERGE: CPT | Mod: ,,, | Performed by: INTERNAL MEDICINE

## 2023-02-20 PROCEDURE — 1159F PR MEDICATION LIST DOCUMENTED IN MEDICAL RECORD: ICD-10-PCS | Mod: ,,, | Performed by: INTERNAL MEDICINE

## 2023-02-20 PROCEDURE — 1159F MED LIST DOCD IN RCRD: CPT | Mod: ,,, | Performed by: INTERNAL MEDICINE

## 2023-02-20 PROCEDURE — 99214 PR OFFICE/OUTPT VISIT, EST, LEVL IV, 30-39 MIN: ICD-10-PCS | Mod: S$PBB,,, | Performed by: INTERNAL MEDICINE

## 2023-02-20 PROCEDURE — 3008F BODY MASS INDEX DOCD: CPT | Mod: ,,, | Performed by: INTERNAL MEDICINE

## 2023-02-20 PROCEDURE — 3079F PR MOST RECENT DIASTOLIC BLOOD PRESSURE 80-89 MM HG: ICD-10-PCS | Mod: ,,, | Performed by: INTERNAL MEDICINE

## 2023-02-20 NOTE — PROGRESS NOTES
PCP: Bony Mary MD    Referring Provider:     Subjective:   Kat Nam is a 62 y.o. female, nonsmoker, with hx of HTn, HLD, GERD, who presents for evaluation of atrial fibrillation.  Pt complained of heart racing and skipping, found to be in a fib with, was admitted, started on amiodarone with control of ventricular response, underwent unsuccessful DC cardioversion.  She has resumed normal activity without provocation of chest pain, pressure or shortness of breath.  She states she has palpitations and quivering associated with shortness of breath for the last many years.  She reports shortness of breath with exertion.    Fam hx of premature CAD in mom and dad.     CTV 2020:  30-32% narrowing of the left common iliac vein as it passes dorsal to the right common iliac artery, significance uncertain                         No deep venous thrombosis of the pelvic veins the shunt. No acute process otherwise as detailed above     EKG 22:  Sinus rhythm with blocked premature atrial complexes.   ECHO 2020:  Ejection fraction  55-60(%). Mild pulmonic regurgitation       Past Surgical History:   Procedure Laterality Date    ARTHROSCOPY OF KNEE Left 2021    Procedure: ARTHROSCOPY, KNEE;  Surgeon: Noman Huerta MD;  Location: [facility] OR;  Service: Orthopedics;  Laterality: Left;    BASAL CELL CARCINOMA EXCISION  2022    left eyebrow (Mohs w/ Dr. Garcia)     SECTION      CHOLECYSTECTOMY      ECHOCARDIOGRAM,TRANSESOPHAGEAL N/A 2023    Procedure: Transesophageal echo (PHILIPPE) intra-procedure log documentation;  Surgeon: Sonu Smith MD;  Location: [facility] CATH LAB;  Service: Cardiology;  Laterality: N/A;    EXCISION OF MEDIAL MENISCUS OF KNEE Left 2021    Procedure: MENISCECTOMY, KNEE, MEDIAL;  Surgeon: Noman Huerta MD;  Location: [facility] OR;  Service: Orthopedics;  Laterality: Left;    HYSTERECTOMY      KNEE ARTHROSCOPY W/ MENISCECTOMY Left 2021     Procedure: ARTHROSCOPY, KNEE, WITH MENISCECTOMY;  Surgeon: Noman Huerta MD;  Location: [facility] OR;  Service: Orthopedics;  Laterality: Left;  LATERAL MENISECTOMY    SPINE SURGERY  1998    Lumbar Laminectomy, L4-L5    TREATMENT OF CARDIAC ARRHYTHMIA N/A 2/8/2023    Procedure: Cardioversion or Defibrillation;  Surgeon: Sonu Smith MD;  Location: [facility] CATH LAB;  Service: Cardiology;  Laterality: N/A;      Family History   Problem Relation Age of Onset    Diabetes Mellitus Mother     Heart disease Father     Hypertension Father     Diabetes Mellitus Sister     Diabetes Mellitus Brother     Heart disease Brother       Social History     Socioeconomic History    Marital status:    Tobacco Use    Smoking status: Never    Smokeless tobacco: Never   Substance and Sexual Activity    Alcohol use: Never    Drug use: Never     Social Determinants of Health     Financial Resource Strain: Low Risk     Difficulty of Paying Living Expenses: Not hard at all   Food Insecurity: No Food Insecurity    Worried About Running Out of Food in the Last Year: Never true    Ran Out of Food in the Last Year: Never true   Transportation Needs: No Transportation Needs    Lack of Transportation (Medical): No    Lack of Transportation (Non-Medical): No   Physical Activity: Insufficiently Active    Days of Exercise per Week: 3 days    Minutes of Exercise per Session: 20 min   Stress: Stress Concern Present    Feeling of Stress : Very much   Social Connections: Moderately Isolated    Frequency of Communication with Friends and Family: More than three times a week    Frequency of Social Gatherings with Friends and Family: Twice a week    Attends Worship Services: Never    Active Member of Clubs or Organizations: No    Attends Club or Organization Meetings: Never    Marital Status:    Housing Stability: Low Risk     Unable to Pay for Housing in the Last Year: No    Number of Places Lived in the Last Year: 1     Unstable Housing in the Last Year: No           Lab Results   Component Value Date     02/10/2023    K 4.0 02/10/2023     02/10/2023    CO2 25 02/10/2023    BUN 12 02/10/2023    CREATININE 0.95 02/10/2023    CALCIUM 9.2 02/10/2023    ANIONGAP 15 02/10/2023    ESTGFRAFRICA 74 03/25/2021    EGFRNONAA 57 (L) 06/24/2022       Lab Results   Component Value Date    CHOL 163 02/10/2023    CHOL 204 (H) 09/28/2022    CHOL 190 04/08/2022     Lab Results   Component Value Date    HDL 49 02/10/2023    HDL 58 09/28/2022    HDL 51 04/08/2022     Lab Results   Component Value Date    LDLCALC 96 02/10/2023    LDLCALC 124 09/28/2022    LDLCALC 118 04/08/2022     Lab Results   Component Value Date    TRIG 91 02/10/2023    TRIG 109 09/28/2022    TRIG 106 04/08/2022     Lab Results   Component Value Date    CHOLHDL 3.3 02/10/2023    CHOLHDL 3.5 09/28/2022    CHOLHDL 3.7 04/08/2022       Lab Results   Component Value Date    WBC 5.68 02/10/2023    HGB 11.8 (L) 02/10/2023    HCT 36.7 (L) 02/10/2023    MCV 90.0 02/10/2023     02/10/2023           Current Outpatient Medications:     amiodarone (PACERONE) 200 MG Tab, Take 1 tablet (200 mg total) by mouth once daily., Disp: 90 tablet, Rfl: 1    amiodarone (PACERONE) 400 MG tablet, Take 1 tablet (400 mg total) by mouth 2 (two) times daily., Disp: 28 tablet, Rfl: 0    apixaban (ELIQUIS) 5 mg Tab, Take 1 tablet (5 mg total) by mouth 2 (two) times daily., Disp: 180 tablet, Rfl: 1    digoxin (LANOXIN) 125 mcg tablet, Take 1 tablet (0.125 mg total) by mouth once daily., Disp: 90 tablet, Rfl: 1    furosemide (LASIX) 20 MG tablet, Take 1 tablet (20 mg total) by mouth once daily., Disp: 90 tablet, Rfl: 1    metoprolol succinate (TOPROL-XL) 25 MG 24 hr tablet, Take 1 tablet (25 mg total) by mouth once daily., Disp: 90 tablet, Rfl: 1    metoprolol succinate (TOPROL-XL) 50 MG 24 hr tablet, Take 1 tablet (50 mg total) by mouth once daily., Disp: 90 tablet, Rfl: 1    metoprolol  succinate (TOPROL-XL) 50 MG 24 hr tablet, Take 1 tablet (50 mg total) by mouth once daily., Disp: 90 tablet, Rfl: 1       Review of Systems   Respiratory:  Negative for cough and shortness of breath.    Cardiovascular:  Negative for chest pain, palpitations, orthopnea, claudication, leg swelling and PND.         Objective:   There were no vitals taken for this visit.    Physical Exam  Constitutional:       General: She is not in acute distress.  Eyes:      Extraocular Movements: Extraocular movements intact.   Cardiovascular:      Rate and Rhythm: Normal rate and regular rhythm.      Pulses: Normal pulses.      Heart sounds: Normal heart sounds. No murmur heard.  Pulmonary:      Effort: Pulmonary effort is normal.      Breath sounds: Normal breath sounds.   Abdominal:      Palpations: Abdomen is soft.   Musculoskeletal:         General: No swelling.      Cervical back: Neck supple.   Skin:     Findings: No rash.   Neurological:      Mental Status: She is alert and oriented to person, place, and time.         Assessment:     Atrial fib        sidney:          #A fib with adequate rate control on current meds, continue to monitor, may be candidate for DC cardioversion.              Patient is otherwise asymptomatic, denies any dizziness or syncope.      2. Morbid obesity, discussed lifestyle changes    continue Eliquis for primary stroke risk reduction for atrial fibrillation.      Please follow-up in 2 months, will consider attempt DC cardioversoin

## 2023-02-21 ENCOUNTER — OFFICE VISIT (OUTPATIENT)
Dept: FAMILY MEDICINE | Facility: CLINIC | Age: 63
End: 2023-02-21
Payer: COMMERCIAL

## 2023-02-21 VITALS
OXYGEN SATURATION: 98 % | SYSTOLIC BLOOD PRESSURE: 170 MMHG | BODY MASS INDEX: 47.09 KG/M2 | HEIGHT: 66 IN | WEIGHT: 293 LBS | HEART RATE: 76 BPM | DIASTOLIC BLOOD PRESSURE: 110 MMHG | RESPIRATION RATE: 19 BRPM

## 2023-02-21 DIAGNOSIS — E66.01 CLASS 3 SEVERE OBESITY WITH BODY MASS INDEX (BMI) OF 45.0 TO 49.9 IN ADULT, UNSPECIFIED OBESITY TYPE, UNSPECIFIED WHETHER SERIOUS COMORBIDITY PRESENT: Primary | ICD-10-CM

## 2023-02-21 PROCEDURE — 1160F RVW MEDS BY RX/DR IN RCRD: CPT | Mod: ,,, | Performed by: FAMILY MEDICINE

## 2023-02-21 PROCEDURE — 99213 OFFICE O/P EST LOW 20 MIN: CPT | Mod: ,,, | Performed by: FAMILY MEDICINE

## 2023-02-21 PROCEDURE — 99213 PR OFFICE/OUTPT VISIT, EST, LEVL III, 20-29 MIN: ICD-10-PCS | Mod: ,,, | Performed by: FAMILY MEDICINE

## 2023-02-21 PROCEDURE — 1111F PR DISCHARGE MEDS RECONCILED W/ CURRENT OUTPATIENT MED LIST: ICD-10-PCS | Mod: ,,, | Performed by: FAMILY MEDICINE

## 2023-02-21 PROCEDURE — 3008F BODY MASS INDEX DOCD: CPT | Mod: ,,, | Performed by: FAMILY MEDICINE

## 2023-02-21 PROCEDURE — 1159F PR MEDICATION LIST DOCUMENTED IN MEDICAL RECORD: ICD-10-PCS | Mod: ,,, | Performed by: FAMILY MEDICINE

## 2023-02-21 PROCEDURE — 3008F PR BODY MASS INDEX (BMI) DOCUMENTED: ICD-10-PCS | Mod: ,,, | Performed by: FAMILY MEDICINE

## 2023-02-21 PROCEDURE — 1159F MED LIST DOCD IN RCRD: CPT | Mod: ,,, | Performed by: FAMILY MEDICINE

## 2023-02-21 PROCEDURE — 1111F DSCHRG MED/CURRENT MED MERGE: CPT | Mod: ,,, | Performed by: FAMILY MEDICINE

## 2023-02-21 PROCEDURE — 3080F DIAST BP >= 90 MM HG: CPT | Mod: ,,, | Performed by: FAMILY MEDICINE

## 2023-02-21 PROCEDURE — 3077F SYST BP >= 140 MM HG: CPT | Mod: ,,, | Performed by: FAMILY MEDICINE

## 2023-02-21 PROCEDURE — 3077F PR MOST RECENT SYSTOLIC BLOOD PRESSURE >= 140 MM HG: ICD-10-PCS | Mod: ,,, | Performed by: FAMILY MEDICINE

## 2023-02-21 PROCEDURE — 1160F PR REVIEW ALL MEDS BY PRESCRIBER/CLIN PHARMACIST DOCUMENTED: ICD-10-PCS | Mod: ,,, | Performed by: FAMILY MEDICINE

## 2023-02-21 PROCEDURE — 3080F PR MOST RECENT DIASTOLIC BLOOD PRESSURE >= 90 MM HG: ICD-10-PCS | Mod: ,,, | Performed by: FAMILY MEDICINE

## 2023-02-21 RX ORDER — SEMAGLUTIDE 0.25 MG/.5ML
0.25 INJECTION, SOLUTION SUBCUTANEOUS
Qty: 0.5 ML | Refills: 1 | Status: SHIPPED | OUTPATIENT
Start: 2023-02-21 | End: 2023-03-23

## 2023-02-21 NOTE — PROGRESS NOTES
Kat Nam is a 62 y.o. female seen today for follow-up for recent hospital stay for paroxysmal supraventricular tachycardia and atrial fibrillation.  Patient is status post a follow-up with Cardiology and is currently on multiple medications control her rate.  They are considering a possible follow-up cardioversion or possible ablation.  Currently she feels well with no chest pain but does have some shortness of breath with exertion.  Patient's lipids while inpatient were to goal.  We discussed weight loss and the patient would like to try Wegovy weekly to see if this will help.      Past Medical History:   Diagnosis Date    Asthma     Bilateral leg pain     Chronic low back pain     Compression of vein     Iliac vein compression syndrome    Digestive disorder     Edema of lower extremity     Gastroesophageal reflux disease     History of basal cell carcinoma (BCC) of skin 02/28/2022    Hx of phlebitis     Hx of thrombophlebitis     Hyperlipidemia     Hypertension     Lymphedema, not elsewhere classified     Morbid obesity     Other skin changes     Peripheral edema     Peripheral vascular disease, unspecified     Postartificial menopausal syndrome     Premature atrial contraction     Sleep apnea      Family History   Problem Relation Age of Onset    Diabetes Mellitus Mother     Heart disease Father     Hypertension Father     Diabetes Mellitus Sister     Diabetes Mellitus Brother     Heart disease Brother      Current Outpatient Medications on File Prior to Visit   Medication Sig Dispense Refill    amiodarone (PACERONE) 200 MG Tab Take 1 tablet (200 mg total) by mouth once daily. 90 tablet 1    amiodarone (PACERONE) 400 MG tablet Take 1 tablet (400 mg total) by mouth 2 (two) times daily. 28 tablet 0    apixaban (ELIQUIS) 5 mg Tab Take 1 tablet (5 mg total) by mouth 2 (two) times daily. 180 tablet 1    digoxin (LANOXIN) 125 mcg tablet Take 1 tablet (0.125 mg total) by mouth once daily. 90 tablet 1     furosemide (LASIX) 20 MG tablet Take 1 tablet (20 mg total) by mouth once daily. 90 tablet 1    metoprolol succinate (TOPROL-XL) 50 MG 24 hr tablet Take 1 tablet (50 mg total) by mouth once daily. 90 tablet 1    [DISCONTINUED] metoprolol succinate (TOPROL-XL) 25 MG 24 hr tablet Take 1 tablet (25 mg total) by mouth once daily. (Patient not taking: Reported on 2/21/2023) 90 tablet 1    [DISCONTINUED] metoprolol succinate (TOPROL-XL) 50 MG 24 hr tablet Take 1 tablet (50 mg total) by mouth once daily. (Patient not taking: Reported on 2/21/2023) 90 tablet 1     No current facility-administered medications on file prior to visit.       There is no immunization history on file for this patient.    Review of Systems   Constitutional:  Positive for malaise/fatigue. Negative for fever and weight loss.   Respiratory:  Positive for shortness of breath.    Cardiovascular:  Negative for chest pain and palpitations.   Gastrointestinal:  Negative for nausea and vomiting.   Psychiatric/Behavioral:  Negative for depression.       Vitals:    02/21/23 1525   BP: (!) 170/110   Pulse: 76   Resp:        Physical Exam  Vitals reviewed.   Constitutional:       Appearance: Normal appearance.   HENT:      Head: Normocephalic.   Eyes:      Extraocular Movements: Extraocular movements intact.      Conjunctiva/sclera: Conjunctivae normal.      Pupils: Pupils are equal, round, and reactive to light.   Neck:      Thyroid: No thyroid mass or thyromegaly.   Cardiovascular:      Rate and Rhythm: Normal rate and regular rhythm.      Heart sounds: Normal heart sounds. No murmur heard.    No gallop.   Pulmonary:      Effort: Pulmonary effort is normal. No respiratory distress.      Breath sounds: Normal breath sounds. No wheezing or rales.   Skin:     General: Skin is warm and dry.      Coloration: Skin is not jaundiced or pale.   Neurological:      Mental Status: She is alert.   Psychiatric:         Mood and Affect: Mood normal.         Behavior:  Behavior normal.         Thought Content: Thought content normal.         Judgment: Judgment normal.        Assessment and Plan  Class 3 severe obesity with body mass index (BMI) of 45.0 to 49.9 in adult, unspecified obesity type, unspecified whether serious comorbidity present  -     semaglutide, weight loss, (WEGOVY) 0.25 mg/0.5 mL PnIj; Inject 0.25 mg into the skin every 7 days.  Dispense: 0.5 mL; Refill: 1            Return to clinic in 1 month for follow-up.  We reviewed the proper use and side effects of Wegovy.  Patient will continue to follow-up with Cardiology regarding her blood pressure and Afib.    Health Maintenance Topics with due status: Not Due       Topic Last Completion Date    Colorectal Cancer Screening 07/18/2022    Lipid Panel 02/10/2023

## 2023-02-25 PROBLEM — I48.91 ATRIAL FIBRILLATION: Status: ACTIVE | Noted: 2023-02-09

## 2023-03-03 ENCOUNTER — TELEPHONE (OUTPATIENT)
Dept: CARDIOLOGY | Facility: CLINIC | Age: 63
End: 2023-03-03
Payer: COMMERCIAL

## 2023-03-03 NOTE — TELEPHONE ENCOUNTER
Pt called with b/p lo/87, 147/87, 148/79;  HR ranging .  Dr. Wyman reviewed. Per Dr. Wyman:  increase Toprol to 100 mg daily.  Keep b/p, hr log and call with results on Monday.  Pt notified and voiced understanding.

## 2023-03-10 RX ORDER — METOPROLOL SUCCINATE 100 MG/1
100 TABLET, EXTENDED RELEASE ORAL DAILY
Qty: 90 TABLET | Refills: 3 | Status: SHIPPED | OUTPATIENT
Start: 2023-03-10 | End: 2023-04-24 | Stop reason: SDUPTHER

## 2023-03-23 ENCOUNTER — OFFICE VISIT (OUTPATIENT)
Dept: FAMILY MEDICINE | Facility: CLINIC | Age: 63
End: 2023-03-23
Payer: COMMERCIAL

## 2023-03-23 VITALS
RESPIRATION RATE: 19 BRPM | HEIGHT: 66 IN | SYSTOLIC BLOOD PRESSURE: 142 MMHG | BODY MASS INDEX: 47.09 KG/M2 | DIASTOLIC BLOOD PRESSURE: 82 MMHG | OXYGEN SATURATION: 97 % | HEART RATE: 80 BPM | WEIGHT: 293 LBS

## 2023-03-23 DIAGNOSIS — I10 HYPERTENSION, UNSPECIFIED TYPE: ICD-10-CM

## 2023-03-23 DIAGNOSIS — Z11.59 SCREENING FOR VIRAL DISEASE: ICD-10-CM

## 2023-03-23 DIAGNOSIS — E78.5 HYPERLIPIDEMIA, UNSPECIFIED HYPERLIPIDEMIA TYPE: Primary | ICD-10-CM

## 2023-03-23 PROCEDURE — 3008F BODY MASS INDEX DOCD: CPT | Mod: ,,, | Performed by: FAMILY MEDICINE

## 2023-03-23 PROCEDURE — 3008F PR BODY MASS INDEX (BMI) DOCUMENTED: ICD-10-PCS | Mod: ,,, | Performed by: FAMILY MEDICINE

## 2023-03-23 PROCEDURE — 86803 HEPATITIS C ANTIBODY: ICD-10-PCS | Mod: ,,, | Performed by: CLINICAL MEDICAL LABORATORY

## 2023-03-23 PROCEDURE — 99214 OFFICE O/P EST MOD 30 MIN: CPT | Mod: ,,, | Performed by: FAMILY MEDICINE

## 2023-03-23 PROCEDURE — 3077F PR MOST RECENT SYSTOLIC BLOOD PRESSURE >= 140 MM HG: ICD-10-PCS | Mod: ,,, | Performed by: FAMILY MEDICINE

## 2023-03-23 PROCEDURE — 86803 HEPATITIS C AB TEST: CPT | Mod: ,,, | Performed by: CLINICAL MEDICAL LABORATORY

## 2023-03-23 PROCEDURE — 1160F PR REVIEW ALL MEDS BY PRESCRIBER/CLIN PHARMACIST DOCUMENTED: ICD-10-PCS | Mod: ,,, | Performed by: FAMILY MEDICINE

## 2023-03-23 PROCEDURE — 1159F PR MEDICATION LIST DOCUMENTED IN MEDICAL RECORD: ICD-10-PCS | Mod: ,,, | Performed by: FAMILY MEDICINE

## 2023-03-23 PROCEDURE — 1160F RVW MEDS BY RX/DR IN RCRD: CPT | Mod: ,,, | Performed by: FAMILY MEDICINE

## 2023-03-23 PROCEDURE — 1159F MED LIST DOCD IN RCRD: CPT | Mod: ,,, | Performed by: FAMILY MEDICINE

## 2023-03-23 PROCEDURE — 99214 PR OFFICE/OUTPT VISIT, EST, LEVL IV, 30-39 MIN: ICD-10-PCS | Mod: ,,, | Performed by: FAMILY MEDICINE

## 2023-03-23 PROCEDURE — 3079F DIAST BP 80-89 MM HG: CPT | Mod: ,,, | Performed by: FAMILY MEDICINE

## 2023-03-23 PROCEDURE — 3077F SYST BP >= 140 MM HG: CPT | Mod: ,,, | Performed by: FAMILY MEDICINE

## 2023-03-23 PROCEDURE — 3079F PR MOST RECENT DIASTOLIC BLOOD PRESSURE 80-89 MM HG: ICD-10-PCS | Mod: ,,, | Performed by: FAMILY MEDICINE

## 2023-03-23 RX ORDER — EZETIMIBE 10 MG/1
10 TABLET ORAL DAILY
Qty: 90 TABLET | Refills: 3 | Status: SHIPPED | OUTPATIENT
Start: 2023-03-23 | End: 2023-10-03 | Stop reason: SDUPTHER

## 2023-03-23 NOTE — PROGRESS NOTES
Kat Nam is a 62 y.o. female seen today for follow-up on her hypertension and obesity.  Unfortunately, patient's insurance did not cover Wegovy and we will hold off on this medication for now.  I reviewed her lab work for February and unfortunately, her LDL is not to goal and we discussed working on diet as well as adding Zetia to her regimen.  Patient has had a family member with a severe reaction to statin medications and is uncomfortable with this class of medication.      Past Medical History:   Diagnosis Date    Asthma     Bilateral leg pain     Chronic low back pain     Compression of vein     Iliac vein compression syndrome    Digestive disorder     Edema of lower extremity     Gastroesophageal reflux disease     History of basal cell carcinoma (BCC) of skin 02/28/2022    Hx of phlebitis     Hx of thrombophlebitis     Hyperlipidemia     Hypertension     Lymphedema, not elsewhere classified     Morbid obesity     Other skin changes     Peripheral edema     Peripheral vascular disease, unspecified     Postartificial menopausal syndrome     Premature atrial contraction     Sleep apnea      Family History   Problem Relation Age of Onset    Diabetes Mellitus Mother     Heart disease Father     Hypertension Father     Diabetes Mellitus Sister     Diabetes Mellitus Brother     Heart disease Brother      Current Outpatient Medications on File Prior to Visit   Medication Sig Dispense Refill    amiodarone (PACERONE) 200 MG Tab Take 1 tablet (200 mg total) by mouth once daily. 90 tablet 1    apixaban (ELIQUIS) 5 mg Tab Take 1 tablet (5 mg total) by mouth 2 (two) times daily. 180 tablet 1    digoxin (LANOXIN) 125 mcg tablet Take 1 tablet (0.125 mg total) by mouth once daily. 90 tablet 1    furosemide (LASIX) 20 MG tablet Take 1 tablet (20 mg total) by mouth once daily. 90 tablet 1    metoprolol succinate (TOPROL-XL) 100 MG 24 hr tablet Take 1 tablet (100 mg total) by mouth once daily. 90 tablet 3    [DISCONTINUED]  amiodarone (PACERONE) 400 MG tablet Take 1 tablet (400 mg total) by mouth 2 (two) times daily. (Patient not taking: Reported on 3/23/2023) 28 tablet 0    [DISCONTINUED] semaglutide, weight loss, (WEGOVY) 0.25 mg/0.5 mL PnIj Inject 0.25 mg into the skin every 7 days. (Patient not taking: Reported on 3/23/2023) 0.5 mL 1     No current facility-administered medications on file prior to visit.       There is no immunization history on file for this patient.    Review of Systems   Constitutional:  Negative for fever, malaise/fatigue and weight loss.   Respiratory:  Negative for shortness of breath.    Cardiovascular:  Negative for chest pain and palpitations.   Gastrointestinal:  Negative for nausea and vomiting.   Psychiatric/Behavioral:  Negative for depression.       Vitals:    03/23/23 0841   BP: (!) 142/82   Pulse:    Resp:        Physical Exam  Vitals reviewed.   Constitutional:       Appearance: Normal appearance.   HENT:      Head: Normocephalic.   Eyes:      Extraocular Movements: Extraocular movements intact.      Conjunctiva/sclera: Conjunctivae normal.      Pupils: Pupils are equal, round, and reactive to light.   Neck:      Thyroid: No thyroid mass or thyromegaly.   Cardiovascular:      Rate and Rhythm: Normal rate. Rhythm irregular.      Heart sounds: Normal heart sounds. No murmur heard.    No gallop.   Pulmonary:      Effort: Pulmonary effort is normal. No respiratory distress.      Breath sounds: Normal breath sounds. No wheezing or rales.   Skin:     General: Skin is warm and dry.      Coloration: Skin is not jaundiced or pale.   Neurological:      Mental Status: She is alert.   Psychiatric:         Mood and Affect: Mood normal.         Behavior: Behavior normal.         Thought Content: Thought content normal.         Judgment: Judgment normal.        Assessment and Plan  Hyperlipidemia, unspecified hyperlipidemia type  -     ezetimibe (ZETIA) 10 mg tablet; Take 1 tablet (10 mg total) by mouth once  daily.  Dispense: 90 tablet; Refill: 3  -     Lipid Panel; Future; Expected date: 06/23/2023    Screening for viral disease  -     Hepatitis C Antibody; Future; Expected date: 03/23/2023    Hypertension, unspecified type  -     CBC Auto Differential; Future; Expected date: 06/23/2023  -     Comprehensive Metabolic Panel; Future; Expected date: 03/23/2023            Return to clinic in 3 months for follow-up visit after fasting lab work is repeated.  She will see Cardiology next month and I have asked her to discuss her lipids with Dr. Wyman as well.    Health Maintenance Topics with due status: Not Due       Topic Last Completion Date    Colorectal Cancer Screening 07/18/2022    Lipid Panel 02/10/2023

## 2023-03-24 ENCOUNTER — TELEPHONE (OUTPATIENT)
Dept: FAMILY MEDICINE | Facility: CLINIC | Age: 63
End: 2023-03-24
Payer: COMMERCIAL

## 2023-03-24 LAB — HCV AB SER QL: NORMAL

## 2023-04-24 ENCOUNTER — OFFICE VISIT (OUTPATIENT)
Dept: CARDIOLOGY | Facility: CLINIC | Age: 63
End: 2023-04-24
Payer: COMMERCIAL

## 2023-04-24 VITALS
DIASTOLIC BLOOD PRESSURE: 98 MMHG | OXYGEN SATURATION: 96 % | WEIGHT: 293 LBS | HEIGHT: 66 IN | BODY MASS INDEX: 47.09 KG/M2 | HEART RATE: 73 BPM | SYSTOLIC BLOOD PRESSURE: 146 MMHG

## 2023-04-24 DIAGNOSIS — I10 PRIMARY HYPERTENSION: Primary | ICD-10-CM

## 2023-04-24 DIAGNOSIS — R60.9 EDEMA, UNSPECIFIED TYPE: ICD-10-CM

## 2023-04-24 DIAGNOSIS — I10 HYPERTENSION, UNSPECIFIED TYPE: ICD-10-CM

## 2023-04-24 DIAGNOSIS — I48.91 ATRIAL FIBRILLATION, UNSPECIFIED TYPE: ICD-10-CM

## 2023-04-24 DIAGNOSIS — G47.33 OSA (OBSTRUCTIVE SLEEP APNEA): Primary | ICD-10-CM

## 2023-04-24 PROCEDURE — 1160F PR REVIEW ALL MEDS BY PRESCRIBER/CLIN PHARMACIST DOCUMENTED: ICD-10-PCS | Mod: ,,, | Performed by: INTERNAL MEDICINE

## 2023-04-24 PROCEDURE — 3080F PR MOST RECENT DIASTOLIC BLOOD PRESSURE >= 90 MM HG: ICD-10-PCS | Mod: ,,, | Performed by: INTERNAL MEDICINE

## 2023-04-24 PROCEDURE — 99215 OFFICE O/P EST HI 40 MIN: CPT | Mod: PBBFAC | Performed by: INTERNAL MEDICINE

## 2023-04-24 PROCEDURE — 93005 ELECTROCARDIOGRAM TRACING: CPT | Mod: PBBFAC | Performed by: INTERNAL MEDICINE

## 2023-04-24 PROCEDURE — 3008F BODY MASS INDEX DOCD: CPT | Mod: ,,, | Performed by: INTERNAL MEDICINE

## 2023-04-24 PROCEDURE — 1159F MED LIST DOCD IN RCRD: CPT | Mod: ,,, | Performed by: INTERNAL MEDICINE

## 2023-04-24 PROCEDURE — 3080F DIAST BP >= 90 MM HG: CPT | Mod: ,,, | Performed by: INTERNAL MEDICINE

## 2023-04-24 PROCEDURE — 93010 ELECTROCARDIOGRAM REPORT: CPT | Mod: S$PBB,,, | Performed by: INTERNAL MEDICINE

## 2023-04-24 PROCEDURE — 3077F PR MOST RECENT SYSTOLIC BLOOD PRESSURE >= 140 MM HG: ICD-10-PCS | Mod: ,,, | Performed by: INTERNAL MEDICINE

## 2023-04-24 PROCEDURE — 1160F RVW MEDS BY RX/DR IN RCRD: CPT | Mod: ,,, | Performed by: INTERNAL MEDICINE

## 2023-04-24 PROCEDURE — 99214 PR OFFICE/OUTPT VISIT, EST, LEVL IV, 30-39 MIN: ICD-10-PCS | Mod: S$PBB,,, | Performed by: INTERNAL MEDICINE

## 2023-04-24 PROCEDURE — 93010 EKG 12-LEAD: ICD-10-PCS | Mod: S$PBB,,, | Performed by: INTERNAL MEDICINE

## 2023-04-24 PROCEDURE — 3077F SYST BP >= 140 MM HG: CPT | Mod: ,,, | Performed by: INTERNAL MEDICINE

## 2023-04-24 PROCEDURE — 3008F PR BODY MASS INDEX (BMI) DOCUMENTED: ICD-10-PCS | Mod: ,,, | Performed by: INTERNAL MEDICINE

## 2023-04-24 PROCEDURE — 99214 OFFICE O/P EST MOD 30 MIN: CPT | Mod: S$PBB,,, | Performed by: INTERNAL MEDICINE

## 2023-04-24 PROCEDURE — 1159F PR MEDICATION LIST DOCUMENTED IN MEDICAL RECORD: ICD-10-PCS | Mod: ,,, | Performed by: INTERNAL MEDICINE

## 2023-04-24 RX ORDER — BUDESONIDE AND FORMOTEROL FUMARATE DIHYDRATE 160; 4.5 UG/1; UG/1
2 AEROSOL RESPIRATORY (INHALATION) EVERY 12 HOURS
COMMUNITY
End: 2023-05-21 | Stop reason: SDUPTHER

## 2023-04-24 RX ORDER — METOPROLOL SUCCINATE 25 MG/1
25 TABLET, EXTENDED RELEASE ORAL DAILY
Qty: 90 TABLET | Refills: 1 | Status: SHIPPED | OUTPATIENT
Start: 2023-04-24 | End: 2023-10-03 | Stop reason: ALTCHOICE

## 2023-04-24 NOTE — PROGRESS NOTES
PCP: Bony Mary MD    Referring Provider:     Subjective:   Kat Nam is a 62 y.o. female, nonsmoker, with hx of HTn, HLD, GERD, who presents for evaluation of atrial fibrillation.  Pt complained of heart racing and skipping, found to be in a fib with, was admitted, started on amiodarone with control of ventricular response, underwent unsuccessful DC cardioversion.  She has resumed normal activity without provocation of chest pain, pressure or shortness of breath.  She states she has palpitations and quivering associated with shortness of breath for the last many years.  She reports shortness of breath with exertion.    Fam hx of premature CAD in mom and dad.     CTV 2020:  30-32% narrowing of the left common iliac vein as it passes dorsal to the right common iliac artery, significance uncertain                         No deep venous thrombosis of the pelvic veins the shunt. No acute process otherwise as detailed above     EKG 22:  Sinus rhythm with blocked premature atrial complexes.   ECHO 2020:  Ejection fraction  55-60(%). Mild pulmonic regurgitation       Past Surgical History:   Procedure Laterality Date    ARTHROSCOPY OF KNEE Left 2021    Procedure: ARTHROSCOPY, KNEE;  Surgeon: Noman Huerta MD;  Location: [facility] OR;  Service: Orthopedics;  Laterality: Left;    BASAL CELL CARCINOMA EXCISION  2022    left eyebrow (Mohs w/ Dr. Garcia)     SECTION      CHOLECYSTECTOMY      ECHOCARDIOGRAM,TRANSESOPHAGEAL N/A 2023    Procedure: Transesophageal echo (PHILIPPE) intra-procedure log documentation;  Surgeon: Sonu Smith MD;  Location: [facility] CATH LAB;  Service: Cardiology;  Laterality: N/A;    EXCISION OF MEDIAL MENISCUS OF KNEE Left 2021    Procedure: MENISCECTOMY, KNEE, MEDIAL;  Surgeon: Noman Huerta MD;  Location: [facility] OR;  Service: Orthopedics;  Laterality: Left;    HYSTERECTOMY      KNEE ARTHROSCOPY W/ MENISCECTOMY Left 2021     Procedure: ARTHROSCOPY, KNEE, WITH MENISCECTOMY;  Surgeon: Noman Huerta MD;  Location: [facility] OR;  Service: Orthopedics;  Laterality: Left;  LATERAL MENISECTOMY    SPINE SURGERY  1998    Lumbar Laminectomy, L4-L5    TREATMENT OF CARDIAC ARRHYTHMIA N/A 2/8/2023    Procedure: Cardioversion or Defibrillation;  Surgeon: Sonu Smith MD;  Location: [facility] CATH LAB;  Service: Cardiology;  Laterality: N/A;      Family History   Problem Relation Age of Onset    Diabetes Mellitus Mother     Heart disease Father     Hypertension Father     Diabetes Mellitus Sister     Diabetes Mellitus Brother     Heart disease Brother       Social History     Socioeconomic History    Marital status:    Tobacco Use    Smoking status: Never     Passive exposure: Past    Smokeless tobacco: Never   Substance and Sexual Activity    Alcohol use: Never    Drug use: Never     Social Determinants of Health     Financial Resource Strain: Low Risk     Difficulty of Paying Living Expenses: Not hard at all   Food Insecurity: No Food Insecurity    Worried About Running Out of Food in the Last Year: Never true    Ran Out of Food in the Last Year: Never true   Transportation Needs: No Transportation Needs    Lack of Transportation (Medical): No    Lack of Transportation (Non-Medical): No   Physical Activity: Insufficiently Active    Days of Exercise per Week: 3 days    Minutes of Exercise per Session: 20 min   Stress: Stress Concern Present    Feeling of Stress : Very much   Social Connections: Moderately Isolated    Frequency of Communication with Friends and Family: More than three times a week    Frequency of Social Gatherings with Friends and Family: Twice a week    Attends Religion Services: Never    Active Member of Clubs or Organizations: No    Attends Club or Organization Meetings: Never    Marital Status:    Housing Stability: Low Risk     Unable to Pay for Housing in the Last Year: No    Number of Places  Lived in the Last Year: 1    Unstable Housing in the Last Year: No           Lab Results   Component Value Date     02/10/2023    K 4.0 02/10/2023     02/10/2023    CO2 25 02/10/2023    BUN 12 02/10/2023    CREATININE 0.95 02/10/2023    CALCIUM 9.2 02/10/2023    ANIONGAP 15 02/10/2023    ESTGFRAFRICA 74 03/25/2021    EGFRNONAA 57 (L) 06/24/2022       Lab Results   Component Value Date    CHOL 163 02/10/2023    CHOL 204 (H) 09/28/2022    CHOL 190 04/08/2022     Lab Results   Component Value Date    HDL 49 02/10/2023    HDL 58 09/28/2022    HDL 51 04/08/2022     Lab Results   Component Value Date    LDLCALC 96 02/10/2023    LDLCALC 124 09/28/2022    LDLCALC 118 04/08/2022     Lab Results   Component Value Date    TRIG 91 02/10/2023    TRIG 109 09/28/2022    TRIG 106 04/08/2022     Lab Results   Component Value Date    CHOLHDL 3.3 02/10/2023    CHOLHDL 3.5 09/28/2022    CHOLHDL 3.7 04/08/2022       Lab Results   Component Value Date    WBC 5.68 02/10/2023    HGB 11.8 (L) 02/10/2023    HCT 36.7 (L) 02/10/2023    MCV 90.0 02/10/2023     02/10/2023           Current Outpatient Medications:     ALBUTEROL INHL, Inhale into the lungs., Disp: , Rfl:     amiodarone (PACERONE) 200 MG Tab, Take 1 tablet (200 mg total) by mouth once daily., Disp: 90 tablet, Rfl: 1    apixaban (ELIQUIS) 5 mg Tab, Take 1 tablet (5 mg total) by mouth 2 (two) times daily., Disp: 180 tablet, Rfl: 1    budesonide-formoterol 160-4.5 mcg (SYMBICORT) 160-4.5 mcg/actuation HFAA, Inhale 2 puffs into the lungs every 12 (twelve) hours. Controller, Disp: , Rfl:     digoxin (LANOXIN) 125 mcg tablet, Take 1 tablet (0.125 mg total) by mouth once daily., Disp: 90 tablet, Rfl: 1    ezetimibe (ZETIA) 10 mg tablet, Take 1 tablet (10 mg total) by mouth once daily., Disp: 90 tablet, Rfl: 3    furosemide (LASIX) 20 MG tablet, Take 1 tablet (20 mg total) by mouth once daily., Disp: 90 tablet, Rfl: 1    metoprolol succinate (TOPROL-XL) 100 MG 24 hr  "tablet, Take 1 tablet (100 mg total) by mouth once daily., Disp: 90 tablet, Rfl: 3       Review of Systems   Respiratory:  Negative for cough and shortness of breath.    Cardiovascular:  Negative for chest pain, palpitations, orthopnea, claudication, leg swelling and PND.       Objective:   BP (!) 146/98 (BP Location: Right arm, Patient Position: Sitting) Comment: states it has been better at home  Pulse 73   Ht 5' 6" (1.676 m)   Wt 133.4 kg (294 lb)   SpO2 96%   BMI 47.45 kg/m²     Physical Exam  Constitutional:       General: She is not in acute distress.  Eyes:      Extraocular Movements: Extraocular movements intact.   Cardiovascular:      Rate and Rhythm: Normal rate and regular rhythm.      Pulses: Normal pulses.      Heart sounds: Normal heart sounds. No murmur heard.  Pulmonary:      Effort: Pulmonary effort is normal.      Breath sounds: Normal breath sounds.   Abdominal:      Palpations: Abdomen is soft.   Musculoskeletal:         General: No swelling.      Cervical back: Neck supple.   Skin:     Findings: No rash.   Neurological:      Mental Status: She is alert and oriented to person, place, and time.         Assessment:     Atrial fib        sidney:          !. A fib with incomplete  rate control on current meds, will add an additonal 25 mg of Torpol XL daily.  Discussed consideration for A fib ablation.  Pt does ont wlant to go thru another DC cardioversion, however would consider referral to EP for a fib ablation.  Will increase metoprolol by adding 25 mg q d, continue Eliquis for primary stroke risk reduction for atrial fibrillation.                 2. Morbid obesity, discussed lifestyle changes              Please follow-up in 2 months,       "

## 2023-04-25 RX ORDER — FUROSEMIDE 20 MG/1
20 TABLET ORAL DAILY
Qty: 90 TABLET | Refills: 1 | Status: SHIPPED | OUTPATIENT
Start: 2023-04-25 | End: 2023-10-03 | Stop reason: DRUGHIGH

## 2023-04-25 RX ORDER — DIGOXIN 125 MCG
0.12 TABLET ORAL DAILY
Qty: 90 TABLET | Refills: 1 | Status: SHIPPED | OUTPATIENT
Start: 2023-04-25 | End: 2024-04-24

## 2023-04-25 RX ORDER — AMIODARONE HYDROCHLORIDE 200 MG/1
200 TABLET ORAL DAILY
Qty: 90 TABLET | Refills: 1 | Status: SHIPPED | OUTPATIENT
Start: 2023-04-25 | End: 2023-10-09 | Stop reason: ALTCHOICE

## 2023-04-25 RX ORDER — METOPROLOL SUCCINATE 100 MG/1
100 TABLET, EXTENDED RELEASE ORAL DAILY
Qty: 90 TABLET | Refills: 1 | Status: SHIPPED | OUTPATIENT
Start: 2023-04-25 | End: 2023-10-03 | Stop reason: ALTCHOICE

## 2023-05-15 PROBLEM — N17.9 AKI (ACUTE KIDNEY INJURY): Status: RESOLVED | Noted: 2023-02-07 | Resolved: 2023-05-15

## 2023-05-19 DIAGNOSIS — J45.909 ASTHMA, UNSPECIFIED ASTHMA SEVERITY, UNSPECIFIED WHETHER COMPLICATED, UNSPECIFIED WHETHER PERSISTENT: ICD-10-CM

## 2023-05-19 DIAGNOSIS — K21.9 GASTROESOPHAGEAL REFLUX DISEASE, UNSPECIFIED WHETHER ESOPHAGITIS PRESENT: ICD-10-CM

## 2023-05-21 RX ORDER — ALBUTEROL SULFATE 90 UG/1
AEROSOL, METERED RESPIRATORY (INHALATION)
Qty: 18 G | Refills: 5 | Status: SHIPPED | OUTPATIENT
Start: 2023-05-21

## 2023-05-21 RX ORDER — BUDESONIDE AND FORMOTEROL FUMARATE DIHYDRATE 160; 4.5 UG/1; UG/1
AEROSOL RESPIRATORY (INHALATION)
Qty: 10.2 G | Refills: 5 | Status: SHIPPED | OUTPATIENT
Start: 2023-05-21 | End: 2023-07-03 | Stop reason: SDUPTHER

## 2023-05-21 RX ORDER — PANTOPRAZOLE SODIUM 40 MG/1
TABLET, DELAYED RELEASE ORAL
Qty: 90 TABLET | Refills: 1 | Status: SHIPPED | OUTPATIENT
Start: 2023-05-21 | End: 2023-07-03 | Stop reason: SDUPTHER

## 2023-06-30 DIAGNOSIS — G47.33 OBSTRUCTIVE SLEEP APNEA: Primary | ICD-10-CM

## 2023-07-03 ENCOUNTER — OFFICE VISIT (OUTPATIENT)
Dept: FAMILY MEDICINE | Facility: CLINIC | Age: 63
End: 2023-07-03
Payer: COMMERCIAL

## 2023-07-03 VITALS
SYSTOLIC BLOOD PRESSURE: 152 MMHG | OXYGEN SATURATION: 98 % | RESPIRATION RATE: 19 BRPM | HEART RATE: 78 BPM | WEIGHT: 286 LBS | DIASTOLIC BLOOD PRESSURE: 86 MMHG | BODY MASS INDEX: 45.96 KG/M2 | TEMPERATURE: 98 F | HEIGHT: 66 IN

## 2023-07-03 DIAGNOSIS — J45.909 ASTHMA, UNSPECIFIED ASTHMA SEVERITY, UNSPECIFIED WHETHER COMPLICATED, UNSPECIFIED WHETHER PERSISTENT: ICD-10-CM

## 2023-07-03 DIAGNOSIS — E78.5 HYPERLIPIDEMIA, UNSPECIFIED HYPERLIPIDEMIA TYPE: Primary | ICD-10-CM

## 2023-07-03 DIAGNOSIS — J30.9 ALLERGIC RHINITIS, UNSPECIFIED SEASONALITY, UNSPECIFIED TRIGGER: ICD-10-CM

## 2023-07-03 DIAGNOSIS — K21.9 GASTROESOPHAGEAL REFLUX DISEASE, UNSPECIFIED WHETHER ESOPHAGITIS PRESENT: ICD-10-CM

## 2023-07-03 PROCEDURE — 3077F PR MOST RECENT SYSTOLIC BLOOD PRESSURE >= 140 MM HG: ICD-10-PCS | Mod: ,,, | Performed by: FAMILY MEDICINE

## 2023-07-03 PROCEDURE — 3008F BODY MASS INDEX DOCD: CPT | Mod: ,,, | Performed by: FAMILY MEDICINE

## 2023-07-03 PROCEDURE — 1160F RVW MEDS BY RX/DR IN RCRD: CPT | Mod: ,,, | Performed by: FAMILY MEDICINE

## 2023-07-03 PROCEDURE — 3008F PR BODY MASS INDEX (BMI) DOCUMENTED: ICD-10-PCS | Mod: ,,, | Performed by: FAMILY MEDICINE

## 2023-07-03 PROCEDURE — 1159F MED LIST DOCD IN RCRD: CPT | Mod: ,,, | Performed by: FAMILY MEDICINE

## 2023-07-03 PROCEDURE — 3079F DIAST BP 80-89 MM HG: CPT | Mod: ,,, | Performed by: FAMILY MEDICINE

## 2023-07-03 PROCEDURE — 1159F PR MEDICATION LIST DOCUMENTED IN MEDICAL RECORD: ICD-10-PCS | Mod: ,,, | Performed by: FAMILY MEDICINE

## 2023-07-03 PROCEDURE — 99214 OFFICE O/P EST MOD 30 MIN: CPT | Mod: ,,, | Performed by: FAMILY MEDICINE

## 2023-07-03 PROCEDURE — 3079F PR MOST RECENT DIASTOLIC BLOOD PRESSURE 80-89 MM HG: ICD-10-PCS | Mod: ,,, | Performed by: FAMILY MEDICINE

## 2023-07-03 PROCEDURE — 1160F PR REVIEW ALL MEDS BY PRESCRIBER/CLIN PHARMACIST DOCUMENTED: ICD-10-PCS | Mod: ,,, | Performed by: FAMILY MEDICINE

## 2023-07-03 PROCEDURE — 99214 PR OFFICE/OUTPT VISIT, EST, LEVL IV, 30-39 MIN: ICD-10-PCS | Mod: ,,, | Performed by: FAMILY MEDICINE

## 2023-07-03 PROCEDURE — 3077F SYST BP >= 140 MM HG: CPT | Mod: ,,, | Performed by: FAMILY MEDICINE

## 2023-07-03 RX ORDER — PANTOPRAZOLE SODIUM 40 MG/1
TABLET, DELAYED RELEASE ORAL
Qty: 90 TABLET | Refills: 1 | Status: SHIPPED | OUTPATIENT
Start: 2023-07-03 | End: 2024-04-02 | Stop reason: SDUPTHER

## 2023-07-03 RX ORDER — BUDESONIDE AND FORMOTEROL FUMARATE DIHYDRATE 160; 4.5 UG/1; UG/1
AEROSOL RESPIRATORY (INHALATION)
Qty: 10.2 G | Refills: 5 | Status: SHIPPED | OUTPATIENT
Start: 2023-07-03 | End: 2024-04-02 | Stop reason: SDUPTHER

## 2023-07-03 RX ORDER — FLUTICASONE PROPIONATE 50 MCG
2 SPRAY, SUSPENSION (ML) NASAL DAILY
Qty: 16 G | Refills: 5 | Status: SHIPPED | OUTPATIENT
Start: 2023-07-03

## 2023-07-03 NOTE — PROGRESS NOTES
Kat Nam is a 62 y.o. female seen today for follow-up on hyperlipidemia.  Patient has been working on her diet and has lost several lb.  Unfortunately, today she is not fasting but will come to the clinic this week for fasting blood work.  He denies any chest pain or shortness of breath but does have a rare palpitation.  She reports cardiology is considering an electrophysiologist for possible ablation therapy for her PACs.  She does defer the Prevnar 20 today but is up-to-date on her colonoscopy screening.  Patient defers mammogram today.  She is complaining of increased symptoms of congestion and left ear discomfort and decreased hearing.    Past Medical History:   Diagnosis Date    Asthma     Bilateral leg pain     Chronic low back pain     Compression of vein     Iliac vein compression syndrome    Digestive disorder     Edema of lower extremity     Gastroesophageal reflux disease     History of basal cell carcinoma (BCC) of skin 02/28/2022    Hx of phlebitis     Hx of thrombophlebitis     Hyperlipidemia     Hypertension     Lymphedema, not elsewhere classified     Morbid obesity     Other skin changes     Peripheral edema     Peripheral vascular disease, unspecified     Postartificial menopausal syndrome     Premature atrial contraction     Sleep apnea      Family History   Problem Relation Age of Onset    Diabetes Mellitus Mother     Heart disease Father     Hypertension Father     Diabetes Mellitus Sister     Diabetes Mellitus Brother     Heart disease Brother      Current Outpatient Medications on File Prior to Visit   Medication Sig Dispense Refill    albuterol (PROVENTIL/VENTOLIN HFA) 90 mcg/actuation inhaler INHALE TWO (2) PUFFS INTO THE LUNGS EVERY FOUR (4) HOURS AS NEEDED FOR WHEEZING. 18 g 5    ALBUTEROL INHL Inhale into the lungs.      amiodarone (PACERONE) 200 MG Tab Take 1 tablet (200 mg total) by mouth once daily. 90 tablet 1    apixaban (ELIQUIS) 5 mg Tab Take 1 tablet (5 mg total) by mouth  2 (two) times daily. 180 tablet 1    digoxin (LANOXIN) 125 mcg tablet Take 1 tablet (0.125 mg total) by mouth once daily. 90 tablet 1    furosemide (LASIX) 20 MG tablet Take 1 tablet (20 mg total) by mouth once daily. 90 tablet 1    metoprolol succinate (TOPROL-XL) 100 MG 24 hr tablet Take 1 tablet (100 mg total) by mouth once daily. 90 tablet 1    metoprolol succinate (TOPROL-XL) 25 MG 24 hr tablet Take 1 tablet (25 mg total) by mouth once daily. 90 tablet 1    [DISCONTINUED] pantoprazole (PROTONIX) 40 MG tablet TAKE ONE (1) TABLET (40 MG TOTAL) BY MOUTH ONCE DAILY. 90 tablet 1    [DISCONTINUED] SYMBICORT 160-4.5 mcg/actuation HFAA INHALE TWO (2) PUFFS BY MOUTH INTO THE LUNGS EVERY 12 (TWELVE) HOURS. 10.2 g 5    ezetimibe (ZETIA) 10 mg tablet Take 1 tablet (10 mg total) by mouth once daily. (Patient not taking: Reported on 7/3/2023) 90 tablet 3     No current facility-administered medications on file prior to visit.       There is no immunization history on file for this patient.    Review of Systems   Constitutional:  Negative for fever, malaise/fatigue and weight loss.   HENT:  Positive for congestion, ear pain and hearing loss.    Respiratory:  Negative for shortness of breath.    Cardiovascular:  Negative for chest pain and palpitations.   Gastrointestinal:  Negative for nausea and vomiting.   Psychiatric/Behavioral:  Negative for depression.       Vitals:    07/03/23 0956   BP: (!) 152/86   Pulse:    Resp:    Temp:        Physical Exam  Vitals reviewed.   Constitutional:       Appearance: Normal appearance.   HENT:      Head: Normocephalic.      Right Ear: Tympanic membrane is retracted.      Left Ear: Tympanic membrane is retracted.      Nose:      Right Turbinates: Swollen.      Left Turbinates: Swollen.   Eyes:      Extraocular Movements: Extraocular movements intact.      Conjunctiva/sclera: Conjunctivae normal.      Pupils: Pupils are equal, round, and reactive to light.   Neck:      Thyroid: No thyroid  mass or thyromegaly.   Cardiovascular:      Rate and Rhythm: Normal rate. Rhythm irregular. FrequentExtrasystoles are present.     Heart sounds: Normal heart sounds. No murmur heard.    No gallop.   Pulmonary:      Effort: Pulmonary effort is normal. No respiratory distress.      Breath sounds: Normal breath sounds. No wheezing or rales.   Skin:     General: Skin is warm and dry.      Coloration: Skin is not jaundiced or pale.   Neurological:      Mental Status: She is alert.   Psychiatric:         Mood and Affect: Mood normal.         Behavior: Behavior normal.         Thought Content: Thought content normal.         Judgment: Judgment normal.        Assessment and Plan  Hyperlipidemia, unspecified hyperlipidemia type  -     CBC Auto Differential; Future; Expected date: 07/10/2023  -     Comprehensive Metabolic Panel; Future; Expected date: 07/10/2023  -     Lipid Panel; Future; Expected date: 07/10/2023    Asthma, unspecified asthma severity, unspecified whether complicated, unspecified whether persistent  -     SYMBICORT 160-4.5 mcg/actuation HFAA; INHALE TWO (2) PUFFS BY MOUTH INTO THE LUNGS EVERY 12 (TWELVE) HOURS.  Dispense: 10.2 g; Refill: 5    Gastroesophageal reflux disease, unspecified whether esophagitis present  -     pantoprazole (PROTONIX) 40 MG tablet; TAKE ONE (1) TABLET (40 MG TOTAL) BY MOUTH ONCE DAILY.  Dispense: 90 tablet; Refill: 1            Return to clinic in 3 months or as needed once her lab work is in.    Health Maintenance Topics with due status: Not Due       Topic Last Completion Date    Colorectal Cancer Screening 07/18/2022    Influenza Vaccine 11/04/2022    Lipid Panel 02/10/2023

## 2023-08-01 ENCOUNTER — TELEPHONE (OUTPATIENT)
Dept: FAMILY MEDICINE | Facility: CLINIC | Age: 63
End: 2023-08-01
Payer: COMMERCIAL

## 2023-08-01 NOTE — TELEPHONE ENCOUNTER
The pt was notified of Dr. aMry reviewing labs and requesting and OV to discuss renal function and lipids. She was transferred to the front for scheduling.

## 2023-08-27 ENCOUNTER — PROCEDURE VISIT (OUTPATIENT)
Dept: SLEEP MEDICINE | Facility: HOSPITAL | Age: 63
End: 2023-08-27
Payer: COMMERCIAL

## 2023-08-27 DIAGNOSIS — G47.33 OBSTRUCTIVE SLEEP APNEA: ICD-10-CM

## 2023-08-27 PROCEDURE — 95811 POLYSOM 6/>YRS CPAP 4/> PARM: CPT | Mod: PO

## 2023-10-03 ENCOUNTER — OFFICE VISIT (OUTPATIENT)
Dept: FAMILY MEDICINE | Facility: CLINIC | Age: 63
End: 2023-10-03
Payer: COMMERCIAL

## 2023-10-03 VITALS
DIASTOLIC BLOOD PRESSURE: 90 MMHG | HEIGHT: 66 IN | OXYGEN SATURATION: 96 % | HEART RATE: 60 BPM | RESPIRATION RATE: 18 BRPM | TEMPERATURE: 98 F | WEIGHT: 290 LBS | SYSTOLIC BLOOD PRESSURE: 152 MMHG | BODY MASS INDEX: 46.61 KG/M2

## 2023-10-03 DIAGNOSIS — M25.50 ARTHRALGIA, UNSPECIFIED JOINT: ICD-10-CM

## 2023-10-03 DIAGNOSIS — G89.29 CHRONIC MIDLINE LOW BACK PAIN WITH RIGHT-SIDED SCIATICA: ICD-10-CM

## 2023-10-03 DIAGNOSIS — M54.41 CHRONIC MIDLINE LOW BACK PAIN WITH RIGHT-SIDED SCIATICA: ICD-10-CM

## 2023-10-03 DIAGNOSIS — E78.5 HYPERLIPIDEMIA, UNSPECIFIED HYPERLIPIDEMIA TYPE: Primary | ICD-10-CM

## 2023-10-03 DIAGNOSIS — M51.9 LUMBAR DISC DISEASE: ICD-10-CM

## 2023-10-03 PROCEDURE — 3008F BODY MASS INDEX DOCD: CPT | Mod: ,,, | Performed by: FAMILY MEDICINE

## 2023-10-03 PROCEDURE — 1159F PR MEDICATION LIST DOCUMENTED IN MEDICAL RECORD: ICD-10-PCS | Mod: ,,, | Performed by: FAMILY MEDICINE

## 2023-10-03 PROCEDURE — 3008F PR BODY MASS INDEX (BMI) DOCUMENTED: ICD-10-PCS | Mod: ,,, | Performed by: FAMILY MEDICINE

## 2023-10-03 PROCEDURE — 1159F MED LIST DOCD IN RCRD: CPT | Mod: ,,, | Performed by: FAMILY MEDICINE

## 2023-10-03 PROCEDURE — 3077F PR MOST RECENT SYSTOLIC BLOOD PRESSURE >= 140 MM HG: ICD-10-PCS | Mod: ,,, | Performed by: FAMILY MEDICINE

## 2023-10-03 PROCEDURE — 99214 OFFICE O/P EST MOD 30 MIN: CPT | Mod: ,,, | Performed by: FAMILY MEDICINE

## 2023-10-03 PROCEDURE — 1160F PR REVIEW ALL MEDS BY PRESCRIBER/CLIN PHARMACIST DOCUMENTED: ICD-10-PCS | Mod: ,,, | Performed by: FAMILY MEDICINE

## 2023-10-03 PROCEDURE — 99214 PR OFFICE/OUTPT VISIT, EST, LEVL IV, 30-39 MIN: ICD-10-PCS | Mod: ,,, | Performed by: FAMILY MEDICINE

## 2023-10-03 PROCEDURE — 3080F PR MOST RECENT DIASTOLIC BLOOD PRESSURE >= 90 MM HG: ICD-10-PCS | Mod: ,,, | Performed by: FAMILY MEDICINE

## 2023-10-03 PROCEDURE — 3077F SYST BP >= 140 MM HG: CPT | Mod: ,,, | Performed by: FAMILY MEDICINE

## 2023-10-03 PROCEDURE — 3080F DIAST BP >= 90 MM HG: CPT | Mod: ,,, | Performed by: FAMILY MEDICINE

## 2023-10-03 PROCEDURE — 1160F RVW MEDS BY RX/DR IN RCRD: CPT | Mod: ,,, | Performed by: FAMILY MEDICINE

## 2023-10-03 RX ORDER — NAPROXEN SODIUM 220 MG/1
81 TABLET, FILM COATED ORAL DAILY
COMMUNITY
Start: 2023-09-27 | End: 2024-09-26

## 2023-10-03 RX ORDER — FUROSEMIDE 80 MG/1
80 TABLET ORAL DAILY
COMMUNITY
End: 2023-11-12 | Stop reason: ALTCHOICE

## 2023-10-03 RX ORDER — EZETIMIBE 10 MG/1
10 TABLET ORAL DAILY
Qty: 90 TABLET | Refills: 3 | Status: SHIPPED | OUTPATIENT
Start: 2023-10-03 | End: 2024-10-02

## 2023-10-03 RX ORDER — METOPROLOL SUCCINATE 50 MG/1
50 TABLET, EXTENDED RELEASE ORAL
COMMUNITY
Start: 2023-09-27

## 2023-10-03 NOTE — PROGRESS NOTES
Kat Nam is a 63 y.o. female seen today for follow-up on her lab work.  Unfortunately patient did have a decreased GFR elevated creatinine elevated BUN and an elevated LDL cholesterol.  Patient does agree to try the Zetia but does defer statin treatment at this time.  Patient also suffers from moderate to severe joint pain affecting her right shoulder knees and also chronic low back pain secondary to lumbar disc disease status post lumbar disc surgery.  Patient unfortunately is taking NSAIDs regularly and I have asked her to discontinue this practice secondary to renal function and risk for cardiac events as well as stroke.  After her ablation her blood pressure medication was decreased and her readings here in the office her high.  Patient is planning to see Cardiology for follow-up next week.  I did highly recommend a statin for her current LDL cholesterol and she may be a candidate for an injectable cholesterol medication if her LDL is not come into the acceptable range with her diet and Zetia.  We will plan on rechecking her renal function off of NSAIDs and with increased H2O intake as well as her lipid panel in 3 months.    Past Medical History:   Diagnosis Date    Asthma     Bilateral leg pain     Chronic low back pain     Compression of vein     Iliac vein compression syndrome    Digestive disorder     Edema of lower extremity     Gastroesophageal reflux disease     History of basal cell carcinoma (BCC) of skin 02/28/2022    Hx of phlebitis     Hx of thrombophlebitis     Hyperlipidemia     Hypertension     Lymphedema, not elsewhere classified     Morbid obesity     Other skin changes     Peripheral edema     Peripheral vascular disease, unspecified     Postartificial menopausal syndrome     Premature atrial contraction     Sleep apnea      Family History   Problem Relation Age of Onset    Diabetes Mellitus Mother     Heart disease Father     Hypertension Father     Diabetes Mellitus Sister     Diabetes  Mellitus Brother     Heart disease Brother      Current Outpatient Medications on File Prior to Visit   Medication Sig Dispense Refill    albuterol (PROVENTIL/VENTOLIN HFA) 90 mcg/actuation inhaler INHALE TWO (2) PUFFS INTO THE LUNGS EVERY FOUR (4) HOURS AS NEEDED FOR WHEEZING. 18 g 5    ALBUTEROL INHL Inhale into the lungs.      amiodarone (PACERONE) 200 MG Tab Take 1 tablet (200 mg total) by mouth once daily. 90 tablet 1    aspirin 81 MG Chew Take 81 mg by mouth once daily.      digoxin (LANOXIN) 125 mcg tablet Take 1 tablet (0.125 mg total) by mouth once daily. 90 tablet 1    fluticasone propionate (FLONASE) 50 mcg/actuation nasal spray 2 sprays (100 mcg total) by Each Nostril route once daily. 16 g 5    furosemide (LASIX) 80 MG tablet Take 80 mg by mouth once daily.      metoprolol succinate (TOPROL-XL) 50 MG 24 hr tablet Take 50 mg by mouth.      pantoprazole (PROTONIX) 40 MG tablet TAKE ONE (1) TABLET (40 MG TOTAL) BY MOUTH ONCE DAILY. 90 tablet 1    rivaroxaban (XARELTO) 20 mg Tab Take 20 mg by mouth.      SYMBICORT 160-4.5 mcg/actuation HFAA INHALE TWO (2) PUFFS BY MOUTH INTO THE LUNGS EVERY 12 (TWELVE) HOURS. 10.2 g 5    [DISCONTINUED] apixaban (ELIQUIS) 5 mg Tab Take 1 tablet (5 mg total) by mouth 2 (two) times daily. 180 tablet 1    [DISCONTINUED] ezetimibe (ZETIA) 10 mg tablet Take 1 tablet (10 mg total) by mouth once daily. (Patient not taking: Reported on 10/3/2023) 90 tablet 3    [DISCONTINUED] furosemide (LASIX) 20 MG tablet Take 1 tablet (20 mg total) by mouth once daily. 90 tablet 1    [DISCONTINUED] metoprolol succinate (TOPROL-XL) 100 MG 24 hr tablet Take 1 tablet (100 mg total) by mouth once daily. 90 tablet 1    [DISCONTINUED] metoprolol succinate (TOPROL-XL) 25 MG 24 hr tablet Take 1 tablet (25 mg total) by mouth once daily. 90 tablet 1     No current facility-administered medications on file prior to visit.       There is no immunization history on file for this patient.    Review of Systems    Constitutional:  Negative for fever, malaise/fatigue and weight loss.   Respiratory:  Negative for shortness of breath.    Cardiovascular:  Negative for chest pain and palpitations.   Gastrointestinal:  Negative for nausea and vomiting.   Musculoskeletal:  Positive for back pain, joint pain and myalgias.   Psychiatric/Behavioral:  Negative for depression.         Vitals:    10/03/23 0952   BP: (!) 150/88   Pulse: 60   Resp: 18   Temp: 98.4 °F (36.9 °C)       Physical Exam  Vitals reviewed.   Constitutional:       Appearance: Normal appearance.   HENT:      Head: Normocephalic.   Eyes:      Extraocular Movements: Extraocular movements intact.      Conjunctiva/sclera: Conjunctivae normal.      Pupils: Pupils are equal, round, and reactive to light.   Neck:      Thyroid: No thyroid mass or thyromegaly.   Cardiovascular:      Rate and Rhythm: Normal rate and regular rhythm.      Heart sounds: Normal heart sounds. No murmur heard.     No gallop.   Pulmonary:      Effort: Pulmonary effort is normal. No respiratory distress.      Breath sounds: Normal breath sounds. No wheezing or rales.   Musculoskeletal:      Right shoulder: Decreased range of motion.   Skin:     General: Skin is warm and dry.      Coloration: Skin is not jaundiced or pale.   Neurological:      Mental Status: She is alert.   Psychiatric:         Mood and Affect: Mood normal.         Behavior: Behavior normal.         Thought Content: Thought content normal.         Judgment: Judgment normal.          Assessment and Plan  1. Hyperlipidemia, unspecified hyperlipidemia type  -     ezetimibe (ZETIA) 10 mg tablet; Take 1 tablet (10 mg total) by mouth once daily.  Dispense: 90 tablet; Refill: 3  -     CBC Auto Differential; Future; Expected date: 01/03/2024  -     Comprehensive Metabolic Panel; Future; Expected date: 01/03/2024  -     Lipid Panel; Future; Expected date: 01/03/2024    2. Arthralgia, unspecified joint  -     Ambulatory referral/consult to Pain  Clinic; Future; Expected date: 10/10/2023    3. Chronic midline low back pain with right-sided sciatica  -     Ambulatory referral/consult to Pain Clinic; Future; Expected date: 10/10/2023    4. Lumbar disc disease  -     Ambulatory referral/consult to Pain Clinic; Future; Expected date: 10/10/2023             Return to clinic in 3 months for follow-up blood work and an office visit or as needed.    Health Maintenance Topics with due status: Not Due       Topic Last Completion Date    Colorectal Cancer Screening 07/18/2022    Lipid Panel 07/31/2023

## 2023-10-09 ENCOUNTER — OFFICE VISIT (OUTPATIENT)
Dept: CARDIOLOGY | Facility: CLINIC | Age: 63
End: 2023-10-09
Payer: COMMERCIAL

## 2023-10-09 VITALS
HEART RATE: 56 BPM | OXYGEN SATURATION: 98 % | SYSTOLIC BLOOD PRESSURE: 140 MMHG | DIASTOLIC BLOOD PRESSURE: 68 MMHG | WEIGHT: 285 LBS | BODY MASS INDEX: 45.8 KG/M2 | HEIGHT: 66 IN

## 2023-10-09 DIAGNOSIS — R00.0 TACHYARRHYTHMIA: ICD-10-CM

## 2023-10-09 DIAGNOSIS — I49.1 PAC (PREMATURE ATRIAL CONTRACTION): ICD-10-CM

## 2023-10-09 DIAGNOSIS — I10 HYPERTENSION, UNSPECIFIED TYPE: Primary | ICD-10-CM

## 2023-10-09 DIAGNOSIS — I48.0 PAROXYSMAL ATRIAL FIBRILLATION: ICD-10-CM

## 2023-10-09 DIAGNOSIS — E66.01 MORBID OBESITY: ICD-10-CM

## 2023-10-09 PROCEDURE — 3078F PR MOST RECENT DIASTOLIC BLOOD PRESSURE < 80 MM HG: ICD-10-PCS | Mod: ,,, | Performed by: INTERNAL MEDICINE

## 2023-10-09 PROCEDURE — 93010 EKG 12-LEAD: ICD-10-PCS | Mod: S$PBB,,, | Performed by: INTERNAL MEDICINE

## 2023-10-09 PROCEDURE — 1159F MED LIST DOCD IN RCRD: CPT | Mod: ,,, | Performed by: INTERNAL MEDICINE

## 2023-10-09 PROCEDURE — 1159F PR MEDICATION LIST DOCUMENTED IN MEDICAL RECORD: ICD-10-PCS | Mod: ,,, | Performed by: INTERNAL MEDICINE

## 2023-10-09 PROCEDURE — 93010 ELECTROCARDIOGRAM REPORT: CPT | Mod: S$PBB,,, | Performed by: INTERNAL MEDICINE

## 2023-10-09 PROCEDURE — 99214 PR OFFICE/OUTPT VISIT, EST, LEVL IV, 30-39 MIN: ICD-10-PCS | Mod: S$PBB,,, | Performed by: INTERNAL MEDICINE

## 2023-10-09 PROCEDURE — 99214 OFFICE O/P EST MOD 30 MIN: CPT | Mod: PBBFAC | Performed by: INTERNAL MEDICINE

## 2023-10-09 PROCEDURE — 4010F ACE/ARB THERAPY RXD/TAKEN: CPT | Mod: ,,, | Performed by: INTERNAL MEDICINE

## 2023-10-09 PROCEDURE — 99214 OFFICE O/P EST MOD 30 MIN: CPT | Mod: S$PBB,,, | Performed by: INTERNAL MEDICINE

## 2023-10-09 PROCEDURE — 3077F SYST BP >= 140 MM HG: CPT | Mod: ,,, | Performed by: INTERNAL MEDICINE

## 2023-10-09 PROCEDURE — 3008F PR BODY MASS INDEX (BMI) DOCUMENTED: ICD-10-PCS | Mod: ,,, | Performed by: INTERNAL MEDICINE

## 2023-10-09 PROCEDURE — 3078F DIAST BP <80 MM HG: CPT | Mod: ,,, | Performed by: INTERNAL MEDICINE

## 2023-10-09 PROCEDURE — 4010F PR ACE/ARB THEARPY RXD/TAKEN: ICD-10-PCS | Mod: ,,, | Performed by: INTERNAL MEDICINE

## 2023-10-09 PROCEDURE — 3008F BODY MASS INDEX DOCD: CPT | Mod: ,,, | Performed by: INTERNAL MEDICINE

## 2023-10-09 PROCEDURE — 3077F PR MOST RECENT SYSTOLIC BLOOD PRESSURE >= 140 MM HG: ICD-10-PCS | Mod: ,,, | Performed by: INTERNAL MEDICINE

## 2023-10-09 PROCEDURE — 93005 ELECTROCARDIOGRAM TRACING: CPT | Mod: PBBFAC | Performed by: INTERNAL MEDICINE

## 2023-10-20 ENCOUNTER — TELEPHONE (OUTPATIENT)
Dept: CARDIOLOGY | Facility: CLINIC | Age: 63
End: 2023-10-20
Payer: COMMERCIAL

## 2023-10-20 NOTE — TELEPHONE ENCOUNTER
Spoke to patient about restarting on Amiodarone due to the palpitations starting back on 10/16/23 after stopping the  Amiodarone. Pt wanted to do something else due to being scared to take Amiodarone again and it makes her hands tingle. Dr. Wyman suggested her to take the Toprol-XL 50 mg twice a day and to call our office Monday after noon to let us know if it has helped with the symptoms since her BP seems to be doing better with the Losartan added. (130s/80s per patient). Patient verbalized understanding.   -HW

## 2023-10-31 RX ORDER — LOSARTAN POTASSIUM 25 MG/1
25 TABLET ORAL DAILY
Qty: 90 TABLET | Refills: 3 | Status: SHIPPED | OUTPATIENT
Start: 2023-10-31 | End: 2024-10-30

## 2023-11-01 NOTE — PROGRESS NOTES
PCP: Bony Mary MD    Referring Provider:     Subjective:   Kat Nam is a 63 y.o. female, nonsmoker, with hx of HTn, HLD, GERD, who presents for evaluation of atrial fibrillation.  Pt complained of heart racing and skipping, found to be in a fib with, was admitted, started on amiodarone with control of ventricular response, underwent unsuccessful DC cardioversion, referred to Dr. Jj, underwent A fib ablation, has maintained NSR..  She has resumed normal activity without provocation of chest pain, pressure or shortness of breath.  She states she had very rare palpitations and quivering associated with shortness of breath for many years, have resolved following ablation.   She reports shortness of breath with exertion continues to improve with increased activity..    Fam hx of premature CAD in mom and dad.     CTV 2020:  30-32% narrowing of the left common iliac vein as it passes dorsal to the right common iliac artery, significance uncertain                         No deep venous thrombosis of the pelvic veins the shunt. No acute process otherwise as detailed above     EKG 22:  Sinus rhythm with blocked premature atrial complexes.   ECHO 2020:  Ejection fraction  55-60(%). Mild pulmonic regurgitation       Past Surgical History:   Procedure Laterality Date    ARTHROSCOPY OF KNEE Left 2021    Procedure: ARTHROSCOPY, KNEE;  Surgeon: Noman Huerta MD;  Location: [facility];  Service: Orthopedics;  Laterality: Left;    BASAL CELL CARCINOMA EXCISION  2022    left eyebrow [facility] Dr. Garcia)     SECTION      CHOLECYSTECTOMY      ECHOCARDIOGRAM,TRANSESOPHAGEAL N/A 2023    Procedure: Transesophageal echo (PHILIPPE) intra-procedure log documentation;  Surgeon: Sonu Smith MD;  Location: [facility] CATH LAB;  Service: Cardiology;  Laterality: N/A;    EXCISION OF MEDIAL MENISCUS OF KNEE Left 2021    Procedure: MENISCECTOMY, KNEE, MEDIAL;  Surgeon: Noman HIGUERA  MD Bradley;  Location: [facility] OR;  Service: Orthopedics;  Laterality: Left;    HYSTERECTOMY      KNEE ARTHROSCOPY W/ MENISCECTOMY Left 06/08/2021    Procedure: ARTHROSCOPY, KNEE, WITH MENISCECTOMY;  Surgeon: Noman Huerta MD;  Location: [facility] OR;  Service: Orthopedics;  Laterality: Left;  LATERAL MENISECTOMY    SPINE SURGERY  1998    Lumbar Laminectomy, L4-L5    TREATMENT OF CARDIAC ARRHYTHMIA N/A 2/8/2023    Procedure: Cardioversion or Defibrillation;  Surgeon: Sonu Smith MD;  Location: [facility] CATH LAB;  Service: Cardiology;  Laterality: N/A;      Family History   Problem Relation Age of Onset    Diabetes Mellitus Mother     Heart disease Father     Hypertension Father     Diabetes Mellitus Sister     Diabetes Mellitus Brother     Heart disease Brother       Social History     Socioeconomic History    Marital status:    Tobacco Use    Smoking status: Never     Passive exposure: Past    Smokeless tobacco: Never   Substance and Sexual Activity    Alcohol use: Never    Drug use: Never     Social Determinants of Health     Financial Resource Strain: Low Risk  (2/8/2023)    Overall Financial Resource Strain (CARDIA)     Difficulty of Paying Living Expenses: Not hard at all   Food Insecurity: No Food Insecurity (2/8/2023)    Hunger Vital Sign     Worried About Running Out of Food in the Last Year: Never true     Ran Out of Food in the Last Year: Never true   Transportation Needs: No Transportation Needs (2/8/2023)    PRAPARE - Transportation     Lack of Transportation (Medical): No     Lack of Transportation (Non-Medical): No   Physical Activity: Insufficiently Active (2/8/2023)    Exercise Vital Sign     Days of Exercise per Week: 3 days     Minutes of Exercise per Session: 20 min   Stress: Stress Concern Present (2/8/2023)    [place] [place] [employer] - Occupational Stress Questionnaire     Feeling of Stress : Very much   Social Connections: Moderately Isolated  (2/8/2023)    Social Connection and Isolation Panel [NHANES]     Frequency of Communication with Friends and Family: More than three times a week     Frequency of Social Gatherings with Friends and Family: Twice a week     Attends Restorationism Services: Never     Active Member of Clubs or Organizations: No     Attends Club or Organization Meetings: Never     Marital Status:    Housing Stability: Low Risk  (2/8/2023)    Housing Stability Vital Sign     Unable to Pay for Housing in the Last Year: No     Number of Places Lived in the Last Year: 1     Unstable Housing in the Last Year: No           Lab Results   Component Value Date     07/31/2023    K 4.2 07/31/2023     07/31/2023    CO2 28 07/31/2023    BUN 20 (H) 07/31/2023    CREATININE 1.32 (H) 07/31/2023    CALCIUM 9.6 07/31/2023    ANIONGAP 10 07/31/2023    ESTGFRAFRICA 74 03/25/2021    EGFRNONAA 57 (L) 06/24/2022       Lab Results   Component Value Date    CHOL 214 (H) 07/31/2023    CHOL 163 02/10/2023    CHOL 204 (H) 09/28/2022     Lab Results   Component Value Date    HDL 51 07/31/2023    HDL 49 02/10/2023    HDL 58 09/28/2022     Lab Results   Component Value Date    LDLCALC 131 07/31/2023    LDLCALC 96 02/10/2023    LDLCALC 124 09/28/2022     Lab Results   Component Value Date    TRIG 161 (H) 07/31/2023    TRIG 91 02/10/2023    TRIG 109 09/28/2022     Lab Results   Component Value Date    CHOLHDL 4.2 07/31/2023    CHOLHDL 3.3 02/10/2023    CHOLHDL 3.5 09/28/2022       Lab Results   Component Value Date    WBC 10.43 07/31/2023    HGB 13.3 07/31/2023    HCT 43.1 07/31/2023    MCV 91.7 07/31/2023     07/31/2023           Current Outpatient Medications:     albuterol (PROVENTIL/VENTOLIN HFA) 90 mcg/actuation inhaler, INHALE TWO (2) PUFFS INTO THE LUNGS EVERY FOUR (4) HOURS AS NEEDED FOR WHEEZING., Disp: 18 g, Rfl: 5    aspirin 81 MG Chew, Take 81 mg by mouth once daily., Disp: , Rfl:     ezetimibe (ZETIA) 10 mg tablet, Take 1 tablet (10 mg  "total) by mouth once daily., Disp: 90 tablet, Rfl: 3    furosemide (LASIX) 80 MG tablet, Take 80 mg by mouth once daily., Disp: , Rfl:     metoprolol succinate (TOPROL-XL) 50 MG 24 hr tablet, Take 50 mg by mouth., Disp: , Rfl:     pantoprazole (PROTONIX) 40 MG tablet, TAKE ONE (1) TABLET (40 MG TOTAL) BY MOUTH ONCE DAILY., Disp: 90 tablet, Rfl: 1    rivaroxaban (XARELTO) 20 mg Tab, Take 20 mg by mouth., Disp: , Rfl:     SYMBICORT 160-4.5 mcg/actuation HFAA, INHALE TWO (2) PUFFS BY MOUTH INTO THE LUNGS EVERY 12 (TWELVE) HOURS., Disp: 10.2 g, Rfl: 5    digoxin (LANOXIN) 125 mcg tablet, Take 1 tablet (0.125 mg total) by mouth once daily. (Patient not taking: Reported on 10/9/2023), Disp: 90 tablet, Rfl: 1    fluticasone propionate (FLONASE) 50 mcg/actuation nasal spray, 2 sprays (100 mcg total) by Each Nostril route once daily. (Patient not taking: Reported on 10/9/2023), Disp: 16 g, Rfl: 5    losartan (COZAAR) 25 MG tablet, Take 1 tablet (25 mg total) by mouth once daily., Disp: 90 tablet, Rfl: 3       Review of Systems   Respiratory:  Negative for cough and shortness of breath.    Cardiovascular:  Negative for chest pain, palpitations, orthopnea, claudication, leg swelling and PND.         Objective:   BP (!) 140/68 (BP Location: Left arm, Patient Position: Sitting)   Pulse (!) 56   Ht 5' 6" (1.676 m)   Wt 129.3 kg (285 lb)   SpO2 98%   BMI 46.00 kg/m²     Physical Exam  Constitutional:       General: She is not in acute distress.  Eyes:      Extraocular Movements: Extraocular movements intact.   Cardiovascular:      Rate and Rhythm: Normal rate and regular rhythm.      Pulses: Normal pulses.      Heart sounds: Normal heart sounds. No murmur heard.  Pulmonary:      Effort: Pulmonary effort is normal.      Breath sounds: Normal breath sounds.   Abdominal:      Palpations: Abdomen is soft.   Musculoskeletal:         General: No swelling.      Cervical back: Neck supple.   Skin:     Findings: No rash. "   Neurological:      Mental Status: She is alert and oriented to person, place, and time.           Assessment:     Atrial fib        sidney:          !. A fib with incomplete rate control on current meds, underwent A fib ablation, reports palpitations have improved, recommendation to continue Eliquis for primary stroke risk reduction for atrial fibrillation.                           2. Morbid obesity, discussed lifestyle changes  3. Hyperlipidemia, following with primary care, on Zetia, did not tolerate Statins, consider Repatha, pt wants to think about injections/evaluate expense            Please follow-up in 2 months,

## 2023-11-03 ENCOUNTER — TELEPHONE (OUTPATIENT)
Dept: CARDIOLOGY | Facility: CLINIC | Age: 63
End: 2023-11-03
Payer: COMMERCIAL

## 2023-11-03 ENCOUNTER — TELEPHONE (OUTPATIENT)
Dept: FAMILY MEDICINE | Facility: CLINIC | Age: 63
End: 2023-11-03
Payer: COMMERCIAL

## 2023-11-03 NOTE — TELEPHONE ENCOUNTER
See prior encounter on 10/20/23. Pt states her palpitations are still coming and going some but mostly gone. Advised pt to call office if anything changes. She voiced understanding.

## 2023-11-03 NOTE — TELEPHONE ENCOUNTER
The pt was notified of insurance not paying for tx at [facility]. The pt voices understanding and would like to go to [facility]. Will call on Monday to schedule an appt.

## 2023-11-10 ENCOUNTER — DOCUMENTATION ONLY (OUTPATIENT)
Dept: FAMILY MEDICINE | Facility: CLINIC | Age: 63
End: 2023-11-10
Payer: COMMERCIAL

## 2023-11-10 NOTE — PROGRESS NOTES
Attempted to contact [facility] [phone] in an attempt to schedule an appt. And was told they are closed today and will have to try back on Monday.

## 2023-11-12 RX ORDER — FUROSEMIDE 20 MG/1
TABLET ORAL
Qty: 90 TABLET | Refills: 1 | Status: SHIPPED | OUTPATIENT
Start: 2023-11-12

## 2023-11-30 ENCOUNTER — TELEPHONE (OUTPATIENT)
Dept: FAMILY MEDICINE | Facility: CLINIC | Age: 63
End: 2023-11-30
Payer: COMMERCIAL

## 2023-11-30 NOTE — TELEPHONE ENCOUNTER
"Called Pt, Pt would like to " Hold Off" on this referral. I notified Ludy with referrals and let her know.   "

## 2023-11-30 NOTE — TELEPHONE ENCOUNTER
----- Message from Sandra Mack sent at 11/30/2023  8:51 AM CST -----  Regarding: Referral  Good morning,     I wanted to reach out about the referral for pain care put in for Ms. Nam. I spoke with [facility] and they said that they don't take her insurance. I tried to call to see who she would like to see instead, but I was unable to reach the pt and couldn't leave a vmail. Will you reach out to her and ask her to please call Ludy @ the referral center, [phone]? Thank you!

## 2023-12-04 ENCOUNTER — OFFICE VISIT (OUTPATIENT)
Dept: CARDIOLOGY | Facility: CLINIC | Age: 63
End: 2023-12-04
Payer: COMMERCIAL

## 2023-12-04 VITALS
SYSTOLIC BLOOD PRESSURE: 130 MMHG | OXYGEN SATURATION: 95 % | HEIGHT: 66 IN | DIASTOLIC BLOOD PRESSURE: 78 MMHG | RESPIRATION RATE: 18 BRPM | BODY MASS INDEX: 45.64 KG/M2 | WEIGHT: 284 LBS | HEART RATE: 60 BPM

## 2023-12-04 DIAGNOSIS — I48.0 PAROXYSMAL ATRIAL FIBRILLATION: ICD-10-CM

## 2023-12-04 DIAGNOSIS — I87.1 MAY-THURNER SYNDROME: ICD-10-CM

## 2023-12-04 DIAGNOSIS — I49.1 PAC (PREMATURE ATRIAL CONTRACTION): ICD-10-CM

## 2023-12-04 DIAGNOSIS — E66.01 MORBID OBESITY: ICD-10-CM

## 2023-12-04 DIAGNOSIS — I10 PRIMARY HYPERTENSION: ICD-10-CM

## 2023-12-04 DIAGNOSIS — R00.0 TACHYARRHYTHMIA: Primary | ICD-10-CM

## 2023-12-04 DIAGNOSIS — I49.1 PAC (PREMATURE ATRIAL CONTRACTION): Primary | ICD-10-CM

## 2023-12-04 PROCEDURE — 4010F ACE/ARB THERAPY RXD/TAKEN: CPT | Mod: ,,, | Performed by: INTERNAL MEDICINE

## 2023-12-04 PROCEDURE — 3075F SYST BP GE 130 - 139MM HG: CPT | Mod: ,,, | Performed by: INTERNAL MEDICINE

## 2023-12-04 PROCEDURE — 3078F DIAST BP <80 MM HG: CPT | Mod: ,,, | Performed by: INTERNAL MEDICINE

## 2023-12-04 PROCEDURE — 3075F PR MOST RECENT SYSTOLIC BLOOD PRESS GE 130-139MM HG: ICD-10-PCS | Mod: ,,, | Performed by: INTERNAL MEDICINE

## 2023-12-04 PROCEDURE — 1159F MED LIST DOCD IN RCRD: CPT | Mod: ,,, | Performed by: INTERNAL MEDICINE

## 2023-12-04 PROCEDURE — 1159F PR MEDICATION LIST DOCUMENTED IN MEDICAL RECORD: ICD-10-PCS | Mod: ,,, | Performed by: INTERNAL MEDICINE

## 2023-12-04 PROCEDURE — 3008F PR BODY MASS INDEX (BMI) DOCUMENTED: ICD-10-PCS | Mod: ,,, | Performed by: INTERNAL MEDICINE

## 2023-12-04 PROCEDURE — 4010F PR ACE/ARB THEARPY RXD/TAKEN: ICD-10-PCS | Mod: ,,, | Performed by: INTERNAL MEDICINE

## 2023-12-04 PROCEDURE — 99213 OFFICE O/P EST LOW 20 MIN: CPT | Mod: S$PBB,,, | Performed by: INTERNAL MEDICINE

## 2023-12-04 PROCEDURE — 99213 PR OFFICE/OUTPT VISIT, EST, LEVL III, 20-29 MIN: ICD-10-PCS | Mod: S$PBB,,, | Performed by: INTERNAL MEDICINE

## 2023-12-04 PROCEDURE — 93010 EKG 12-LEAD: ICD-10-PCS | Mod: S$PBB,,, | Performed by: INTERNAL MEDICINE

## 2023-12-04 PROCEDURE — 99214 OFFICE O/P EST MOD 30 MIN: CPT | Mod: PBBFAC | Performed by: INTERNAL MEDICINE

## 2023-12-04 PROCEDURE — 3008F BODY MASS INDEX DOCD: CPT | Mod: ,,, | Performed by: INTERNAL MEDICINE

## 2023-12-04 PROCEDURE — 1160F RVW MEDS BY RX/DR IN RCRD: CPT | Mod: ,,, | Performed by: INTERNAL MEDICINE

## 2023-12-04 PROCEDURE — 1160F PR REVIEW ALL MEDS BY PRESCRIBER/CLIN PHARMACIST DOCUMENTED: ICD-10-PCS | Mod: ,,, | Performed by: INTERNAL MEDICINE

## 2023-12-04 PROCEDURE — 3078F PR MOST RECENT DIASTOLIC BLOOD PRESSURE < 80 MM HG: ICD-10-PCS | Mod: ,,, | Performed by: INTERNAL MEDICINE

## 2023-12-04 PROCEDURE — 93005 ELECTROCARDIOGRAM TRACING: CPT | Mod: PBBFAC | Performed by: INTERNAL MEDICINE

## 2023-12-04 PROCEDURE — 93010 ELECTROCARDIOGRAM REPORT: CPT | Mod: S$PBB,,, | Performed by: INTERNAL MEDICINE

## 2023-12-14 NOTE — PROGRESS NOTES
PCP: Bony Mary MD    Referring Provider:     Subjective:   Kat Nam is a 63 y.o. female, nonsmoker, with hx of HTn, HLD, GERD, who presents for evaluation of atrial fibrillation.  Pt reports palpitations have resolved after she was referred to Dr. Jj, underwent A fib ablation, has maintained NSR..  She has resumed normal activity without provocation of chest pain, pressure or shortness of breath.  She states she had very rare palpitations and quivering associated with shortness of breath for many years, have resolved following ablation.   She reports shortness of breath with exertion continues to improve with increased activity..        CTV 2020:  30-32% narrowing of the left common iliac vein as it passes dorsal to the right common iliac artery, significance uncertain                         No deep venous thrombosis of the pelvic veins the shunt. No acute process otherwise as detailed above     EKG 22:  Sinus rhythm with blocked premature atrial complexes.   ECHO 2020:  Ejection fraction  55-60(%). Mild pulmonic regurgitation       Past Surgical History:   Procedure Laterality Date    ARTHROSCOPY OF KNEE Left 2021    Procedure: ARTHROSCOPY, KNEE;  Surgeon: Noman Huerta MD;  Location: [facility] OR;  Service: Orthopedics;  Laterality: Left;    BASAL CELL CARCINOMA EXCISION  2022    left eyebrow (Mohs w/ Dr. Garcia)     SECTION      CHOLECYSTECTOMY      ECHOCARDIOGRAM,TRANSESOPHAGEAL N/A 2023    Procedure: Transesophageal echo (PHILIPPE) intra-procedure log documentation;  Surgeon: Sonu Smith MD;  Location: [facility] CATH LAB;  Service: Cardiology;  Laterality: N/A;    EXCISION OF MEDIAL MENISCUS OF KNEE Left 2021    Procedure: MENISCECTOMY, KNEE, MEDIAL;  Surgeon: Noman Huerta MD;  Location: [facility] OR;  Service: Orthopedics;  Laterality: Left;    HYSTERECTOMY      KNEE ARTHROSCOPY W/ MENISCECTOMY Left 2021    Procedure:  ARTHROSCOPY, KNEE, WITH MENISCECTOMY;  Surgeon: Noman Huerta MD;  Location: [facility] OR;  Service: Orthopedics;  Laterality: Left;  LATERAL MENISECTOMY    SPINE SURGERY  1998    Lumbar Laminectomy, L4-L5    TREATMENT OF CARDIAC ARRHYTHMIA N/A 2/8/2023    Procedure: Cardioversion or Defibrillation;  Surgeon: Sonu Smith MD;  Location: [facility] CATH LAB;  Service: Cardiology;  Laterality: N/A;      Family History   Problem Relation Age of Onset    Diabetes Mellitus Mother     Heart disease Father     Hypertension Father     Diabetes Mellitus Sister     Diabetes Mellitus Brother     Heart disease Brother       Social History     Socioeconomic History    Marital status:    Tobacco Use    Smoking status: Never     Passive exposure: Past    Smokeless tobacco: Never   Substance and Sexual Activity    Alcohol use: Never    Drug use: Never     Social Determinants of Health     Financial Resource Strain: Low Risk  (2/8/2023)    Overall Financial Resource Strain (CARDIA)     Difficulty of Paying Living Expenses: Not hard at all   Food Insecurity: No Food Insecurity (2/8/2023)    Hunger Vital Sign     Worried About Running Out of Food in the Last Year: Never true     Ran Out of Food in the Last Year: Never true   Transportation Needs: No Transportation Needs (2/8/2023)    PRAPARE - Transportation     Lack of Transportation (Medical): No     Lack of Transportation (Non-Medical): No   Physical Activity: Insufficiently Active (2/8/2023)    Exercise Vital Sign     Days of Exercise per Week: 3 days     Minutes of Exercise per Session: 20 min   Stress: Stress Concern Present (2/8/2023)    [employer] [place] [employer] - Occupational Stress Questionnaire     Feeling of Stress : Very much   Social Connections: Moderately Isolated (2/8/2023)    Social Connection and Isolation Panel [NHANES]     Frequency of Communication with Friends and Family: More than three times a week     Frequency of Social  Gatherings with Friends and Family: Twice a week     Attends Spiritism Services: Never     Active Member of Clubs or Organizations: No     Attends Club or Organization Meetings: Never     Marital Status:    Housing Stability: Low Risk  (2/8/2023)    Housing Stability Vital Sign     Unable to Pay for Housing in the Last Year: No     Number of Places Lived in the Last Year: 1     Unstable Housing in the Last Year: No           Lab Results   Component Value Date     07/31/2023    K 4.2 07/31/2023     07/31/2023    CO2 28 07/31/2023    BUN 20 (H) 07/31/2023    CREATININE 1.32 (H) 07/31/2023    CALCIUM 9.6 07/31/2023    ANIONGAP 10 07/31/2023    ESTGFRAFRICA 74 03/25/2021    EGFRNONAA 57 (L) 06/24/2022       Lab Results   Component Value Date    CHOL 214 (H) 07/31/2023    CHOL 163 02/10/2023    CHOL 204 (H) 09/28/2022     Lab Results   Component Value Date    HDL 51 07/31/2023    HDL 49 02/10/2023    HDL 58 09/28/2022     Lab Results   Component Value Date    LDLCALC 131 07/31/2023    LDLCALC 96 02/10/2023    LDLCALC 124 09/28/2022     Lab Results   Component Value Date    TRIG 161 (H) 07/31/2023    TRIG 91 02/10/2023    TRIG 109 09/28/2022     Lab Results   Component Value Date    CHOLHDL 4.2 07/31/2023    CHOLHDL 3.3 02/10/2023    CHOLHDL 3.5 09/28/2022       Lab Results   Component Value Date    WBC 10.43 07/31/2023    HGB 13.3 07/31/2023    HCT 43.1 07/31/2023    MCV 91.7 07/31/2023     07/31/2023           Current Outpatient Medications:     aspirin 81 MG Chew, Take 81 mg by mouth once daily., Disp: , Rfl:     ezetimibe (ZETIA) 10 mg tablet, Take 1 tablet (10 mg total) by mouth once daily., Disp: 90 tablet, Rfl: 3    furosemide (LASIX) 20 MG tablet, TAKE 1 TABLET BY MOUTH EACH MORNING FOR FLUID OR SWELLING, Disp: 90 tablet, Rfl: 1    losartan (COZAAR) 25 MG tablet, Take 1 tablet (25 mg total) by mouth once daily., Disp: 90 tablet, Rfl: 3    metoprolol succinate (TOPROL-XL) 50 MG 24 hr  "tablet, Take 50 mg by mouth., Disp: , Rfl:     pantoprazole (PROTONIX) 40 MG tablet, TAKE ONE (1) TABLET (40 MG TOTAL) BY MOUTH ONCE DAILY., Disp: 90 tablet, Rfl: 1    rivaroxaban (XARELTO) 20 mg Tab, Take 20 mg by mouth., Disp: , Rfl:     SYMBICORT 160-4.5 mcg/actuation HFAA, INHALE TWO (2) PUFFS BY MOUTH INTO THE LUNGS EVERY 12 (TWELVE) HOURS., Disp: 10.2 g, Rfl: 5    albuterol (PROVENTIL/VENTOLIN HFA) 90 mcg/actuation inhaler, INHALE TWO (2) PUFFS INTO THE LUNGS EVERY FOUR (4) HOURS AS NEEDED FOR WHEEZING., Disp: 18 g, Rfl: 5    digoxin (LANOXIN) 125 mcg tablet, Take 1 tablet (0.125 mg total) by mouth once daily. (Patient not taking: Reported on 10/9/2023), Disp: 90 tablet, Rfl: 1    fluticasone propionate (FLONASE) 50 mcg/actuation nasal spray, 2 sprays (100 mcg total) by Each Nostril route once daily. (Patient not taking: Reported on 10/9/2023), Disp: 16 g, Rfl: 5       Review of Systems   Respiratory:  Negative for cough and shortness of breath.    Cardiovascular:  Negative for chest pain, palpitations, orthopnea, claudication, leg swelling and PND.         Objective:   /78   Pulse 60   Resp 18   Ht 5' 6" (1.676 m)   Wt 128.8 kg (284 lb)   SpO2 95%   BMI 45.84 kg/m²     Physical Exam  Constitutional:       General: She is not in acute distress.  Eyes:      Extraocular Movements: Extraocular movements intact.   Cardiovascular:      Rate and Rhythm: Normal rate and regular rhythm.      Pulses: Normal pulses.      Heart sounds: Normal heart sounds. No murmur heard.  Pulmonary:      Effort: Pulmonary effort is normal.      Breath sounds: Normal breath sounds.   Abdominal:      Palpations: Abdomen is soft.   Musculoskeletal:         General: No swelling.      Cervical back: Neck supple.   Skin:     Findings: No rash.   Neurological:      Mental Status: She is alert and oriented to person, place, and time.           Assessment:     Atrial fib        sidney:          !. A fib with incomplete rate control on " current meds, underwent A fib ablation, reports palpitations have improved, recommendation to continue Eliquis for primary stroke risk reduction for atrial fibrillation.                             2. Morbid obesity, discussed lifestyle changes    3. Hyperlipidemia, following with primary care, on Zetia, did not tolerate Statins, consider Repatha, pt does not want to incur costs today, will monitor diet and modify lefesre            Please follow-up in 6 months,

## 2024-01-23 ENCOUNTER — OFFICE VISIT (OUTPATIENT)
Dept: FAMILY MEDICINE | Facility: CLINIC | Age: 64
End: 2024-01-23
Payer: COMMERCIAL

## 2024-01-23 VITALS
BODY MASS INDEX: 45.64 KG/M2 | DIASTOLIC BLOOD PRESSURE: 64 MMHG | RESPIRATION RATE: 19 BRPM | OXYGEN SATURATION: 98 % | SYSTOLIC BLOOD PRESSURE: 134 MMHG | HEIGHT: 66 IN | HEART RATE: 68 BPM | TEMPERATURE: 99 F | WEIGHT: 284 LBS

## 2024-01-23 DIAGNOSIS — Z11.52 ENCOUNTER FOR SCREENING FOR COVID-19: ICD-10-CM

## 2024-01-23 DIAGNOSIS — Z20.828 EXPOSURE TO VIRAL DISEASE: ICD-10-CM

## 2024-01-23 DIAGNOSIS — U07.1 COVID-19: Primary | ICD-10-CM

## 2024-01-23 LAB
CTP QC/QA: YES
FLUAV AG NPH QL: NEGATIVE
FLUBV AG NPH QL: NEGATIVE
S PYO RRNA THROAT QL PROBE: NEGATIVE
SARS-COV-2 RDRP RESP QL NAA+PROBE: POSITIVE

## 2024-01-23 PROCEDURE — 87804 INFLUENZA ASSAY W/OPTIC: CPT | Mod: 59,QW,, | Performed by: NURSE PRACTITIONER

## 2024-01-23 PROCEDURE — 1159F MED LIST DOCD IN RCRD: CPT | Mod: ,,, | Performed by: NURSE PRACTITIONER

## 2024-01-23 PROCEDURE — 3008F BODY MASS INDEX DOCD: CPT | Mod: ,,, | Performed by: NURSE PRACTITIONER

## 2024-01-23 PROCEDURE — 4010F ACE/ARB THERAPY RXD/TAKEN: CPT | Mod: ,,, | Performed by: NURSE PRACTITIONER

## 2024-01-23 PROCEDURE — 1160F RVW MEDS BY RX/DR IN RCRD: CPT | Mod: ,,, | Performed by: NURSE PRACTITIONER

## 2024-01-23 PROCEDURE — 87635 SARS-COV-2 COVID-19 AMP PRB: CPT | Mod: QW,,, | Performed by: NURSE PRACTITIONER

## 2024-01-23 PROCEDURE — 99213 OFFICE O/P EST LOW 20 MIN: CPT | Mod: ,,, | Performed by: NURSE PRACTITIONER

## 2024-01-23 PROCEDURE — 3075F SYST BP GE 130 - 139MM HG: CPT | Mod: ,,, | Performed by: NURSE PRACTITIONER

## 2024-01-23 PROCEDURE — 87880 STREP A ASSAY W/OPTIC: CPT | Mod: QW,,, | Performed by: NURSE PRACTITIONER

## 2024-01-23 PROCEDURE — 3078F DIAST BP <80 MM HG: CPT | Mod: ,,, | Performed by: NURSE PRACTITIONER

## 2024-01-23 RX ORDER — BENZONATATE 100 MG/1
100 CAPSULE ORAL 3 TIMES DAILY PRN
Qty: 30 CAPSULE | Refills: 0 | Status: SHIPPED | OUTPATIENT
Start: 2024-01-23 | End: 2024-04-02 | Stop reason: ALTCHOICE

## 2024-01-23 RX ORDER — IPRATROPIUM BROMIDE 21 UG/1
2 SPRAY, METERED NASAL 2 TIMES DAILY
Qty: 30 ML | Refills: 0 | Status: SHIPPED | OUTPATIENT
Start: 2024-01-23 | End: 2024-04-02

## 2024-01-23 NOTE — PROGRESS NOTES
[facility]    Chief Complaint      Chief Complaint   Patient presents with    Nasal Congestion     All sx onset of yesterday    Sore Throat    Chest Congestion     With shortness of breath    Diarrhea       History of Present Illness      Kat Nam is a 63 y.o. female. She has a past medical history of hypertension and hyperlipidemia, who presents today for URI type symptoms- congestion, ST, diarrhea since yesterday.  Patient does have Asthma as well.  States she has her Symbicort and Albuterol inhalers at home.    Past Medical History:  Past Medical History:   Diagnosis Date    Asthma     Bilateral leg pain     Chronic low back pain     Compression of vein     Iliac vein compression syndrome    Digestive disorder     Edema of lower extremity     Gastroesophageal reflux disease     History of basal cell carcinoma (BCC) of skin 2022    Hx of phlebitis     Hx of thrombophlebitis     Hyperlipidemia     Hypertension     Lymphedema, not elsewhere classified     Morbid obesity     Other skin changes     Peripheral edema     Peripheral vascular disease, unspecified     Postartificial menopausal syndrome     Premature atrial contraction     Sleep apnea        Past Surgical History:   has a past surgical history that includes  section; Cholecystectomy; Spine surgery (); Hysterectomy; Arthroscopy of knee (Left, 2021); Excision of medial meniscus of knee (Left, 2021); Knee arthroscopy w/ meniscectomy (Left, 2021); Excision basal cell carcinoma (2022); echocardiogram,transesophageal (N/A, 2023); and Treatment of cardiac arrhythmia (N/A, 2023).    Social History:  Social History     Tobacco Use    Smoking status: Never     Passive exposure: Past    Smokeless tobacco: Never   Substance Use Topics    Alcohol use: Never    Drug use: Never       I personally reviewed all past medical, surgical, and social.     Review of Systems   Constitutional:  Positive for fatigue.  Negative for chills and fever.   HENT:  Positive for congestion and sore throat.    Respiratory:  Positive for cough. Negative for shortness of breath.    Cardiovascular: Negative.    Gastrointestinal:  Positive for diarrhea. Negative for nausea and vomiting.   Musculoskeletal:  Negative for myalgias.   Skin:  Negative for rash.   Neurological:  Negative for headaches.        Medications:  Outpatient Encounter Medications as of 1/23/2024   Medication Sig Dispense Refill    albuterol (PROVENTIL/VENTOLIN HFA) 90 mcg/actuation inhaler INHALE TWO (2) PUFFS INTO THE LUNGS EVERY FOUR (4) HOURS AS NEEDED FOR WHEEZING. 18 g 5    aspirin 81 MG Chew Take 81 mg by mouth once daily.      ezetimibe (ZETIA) 10 mg tablet Take 1 tablet (10 mg total) by mouth once daily. 90 tablet 3    furosemide (LASIX) 20 MG tablet TAKE 1 TABLET BY MOUTH EACH MORNING FOR FLUID OR SWELLING 90 tablet 1    losartan (COZAAR) 25 MG tablet Take 1 tablet (25 mg total) by mouth once daily. 90 tablet 3    metoprolol succinate (TOPROL-XL) 50 MG 24 hr tablet Take 50 mg by mouth.      pantoprazole (PROTONIX) 40 MG tablet TAKE ONE (1) TABLET (40 MG TOTAL) BY MOUTH ONCE DAILY. 90 tablet 1    rivaroxaban (XARELTO) 20 mg Tab Take 20 mg by mouth.      SYMBICORT 160-4.5 mcg/actuation HFAA INHALE TWO (2) PUFFS BY MOUTH INTO THE LUNGS EVERY 12 (TWELVE) HOURS. 10.2 g 5    benzonatate (TESSALON) 100 MG capsule Take 1 capsule (100 mg total) by mouth 3 (three) times daily as needed for Cough. 30 capsule 0    digoxin (LANOXIN) 125 mcg tablet Take 1 tablet (0.125 mg total) by mouth once daily. (Patient not taking: Reported on 10/9/2023) 90 tablet 1    fluticasone propionate (FLONASE) 50 mcg/actuation nasal spray 2 sprays (100 mcg total) by Each Nostril route once daily. (Patient not taking: Reported on 10/9/2023) 16 g 5    ipratropium (ATROVENT) 21 mcg (0.03 %) nasal spray 2 sprays by Each Nostril route 2 (two) times daily. 30 mL 0     No facility-administered encounter  "medications on file as of 1/23/2024.       Allergies:  Review of patient's allergies indicates:   Allergen Reactions    Poppy Swelling    Aldomet amanda hcl injection      Headache    Methyldopa      Other reaction(s): Unknown       Health Maintenance:    There is no immunization history on file for this patient.   Health Maintenance   Topic Date Due    TETANUS VACCINE  02/21/2024 (Originally 8/8/1978)    Shingles Vaccine (1 of 2) 02/21/2024 (Originally 8/8/2010)    Colorectal Cancer Screening  07/18/2025    Lipid Panel  07/31/2028    Hepatitis C Screening  Completed    Mammogram  Discontinued        Physical Exam      Vital Signs  Temp: 99 °F (37.2 °C)  Temp Source: Oral  Pulse: 68  Resp: 19  SpO2: 98 %  BP: 134/64  BP Location: Right arm  Patient Position: Sitting  Pain Score:   3  Height and Weight  Height: 5' 6" (167.6 cm)  Weight: 128.8 kg (284 lb)  BSA (Calculated - sq m): 2.45 sq meters  BMI (Calculated): 45.9  Weight in (lb) to have BMI = 25: 154.6]    Physical Exam  Vitals and nursing note reviewed.   Constitutional:       General: She is not in acute distress.     Appearance: She is well-developed. She is ill-appearing.   HENT:      Head: Normocephalic.      Right Ear: Hearing normal.      Left Ear: Hearing normal.      Nose: Congestion present.   Eyes:      General: Lids are normal.      Conjunctiva/sclera: Conjunctivae normal.   Cardiovascular:      Rate and Rhythm: Normal rate.   Pulmonary:      Effort: Pulmonary effort is normal. No respiratory distress.   Musculoskeletal:         General: Normal range of motion.      Cervical back: Normal range of motion and neck supple.   Skin:     General: Skin is warm and dry.   Neurological:      Mental Status: She is alert and oriented to person, place, and time.      Gait: Gait is intact.   Psychiatric:         Behavior: Behavior is cooperative.          Laboratory:  CBC:  Recent Labs   Lab 02/09/23  0250 02/10/23  0345 07/31/23  0902   WBC 4.83 5.68 10.43   RBC " 4.08 L 4.08 L 4.70   Hemoglobin 11.8 L 11.8 L 13.3   Hematocrit 37.9 L 36.7 L 43.1   Platelet Count 206 219 319   MCV 92.9 90.0 91.7   MCH 28.9 28.9 28.3   MCHC 31.1 L 32.2 30.9 L     CMP:  Recent Labs   Lab 09/28/22  0932 02/07/23  0455 02/08/23  0318 07/31/23  0902   Glucose 86 124 H   < > 94   Calcium 9.3 9.6   < > 9.6   Albumin 3.9 4.6  --  3.6   Total Protein 7.0 7.9  --  7.0   Sodium 138 135 L   < > 140   Potassium 5.4 H 5.2 H   < > 4.2   CO2 28 22   < > 28   Chloride 104 100   < > 106   BUN 23 H 21 H   < > 20 H   Alk Phos 108 98  --  105   ALT 15 21  --  16   AST 10 L 18  --  11 L   Bilirubin, Total 0.3 0.7  --  0.4    < > = values in this interval not displayed.     LIPIDS:  Recent Labs   Lab 03/25/21  1014 04/08/22  1000 09/28/22  0932 02/07/23  0455 02/10/23  0345 07/31/23  0902   TSH 2.280  --   --  1.810  --   --    HDL Cholesterol 62   < > 58  --  49 51   Cholesterol 217   < > 204 H  --  163 214 H   Triglycerides 117   < > 109  --  91 161 H   LDL Calculated 132   < > 124  --  96 131   Cholesterol/HDL Ratio (Risk Factor)  --    < > 3.5  --  3.3 4.2   Non-   < > 146  --  114 163    < > = values in this interval not displayed.     TSH:  Recent Labs   Lab 03/25/21  1014 02/07/23  0455   TSH 2.280 1.810     A1C:        Assessment/Plan     Kat Nam is a 63 y.o.female with:     1. COVID-19  -     ipratropium (ATROVENT) 21 mcg (0.03 %) nasal spray; 2 sprays by Each Nostril route 2 (two) times daily.  Dispense: 30 mL; Refill: 0  -     benzonatate (TESSALON) 100 MG capsule; Take 1 capsule (100 mg total) by mouth 3 (three) times daily as needed for Cough.  Dispense: 30 capsule; Refill: 0    2. Encounter for screening for COVID-19  -     POCT COVID-19 Rapid Screening    3. Exposure to viral disease  -     POCT Influenza A/B  -     POCT rapid strep A       Instructed to take Claritin, Pepcid, and Vit C OTC daily x 14 days  Patient can not take Mucinex or Pseudoephedrine due to her heart  conditions    Total time spent face-to-face and non-face-to-face coordinating care for this encounter was: 20 minutes    Chronic conditions status updated as per HPI.  Other than changes above, cont current medications and maintain follow up with specialists.  Return to clinic prn if symptoms worsen or fail to improve.    Rona Hidalgo, MOP  [facility]

## 2024-01-23 NOTE — LETTER
January 23, 2024    Kat Nam  [address] [facility] [place] [address]             [facility]  Family Medicine  [address] [place] [address]  Phone: [phone]  Fax: [phone]   January 23, 2024     Patient: Kat Nam   YOB: 1960   Date of Visit: 1/23/2024       To Whom it May Concern:    Kat Nam was seen in my clinic on 1/23/2024. She may return to work on 1/29/24 .    Please excuse her from any classes or work missed.    If you have any questions or concerns, please don't hesitate to call.    Sincerely,         Rona Hidalgo, MOP

## 2024-02-13 ENCOUNTER — PATIENT MESSAGE (OUTPATIENT)
Dept: CARDIOLOGY | Facility: CLINIC | Age: 64
End: 2024-02-13
Payer: COMMERCIAL

## 2024-02-13 DIAGNOSIS — I10 PRIMARY HYPERTENSION: ICD-10-CM

## 2024-02-15 ENCOUNTER — TELEPHONE (OUTPATIENT)
Dept: CARDIOLOGY | Facility: CLINIC | Age: 64
End: 2024-02-15
Payer: COMMERCIAL

## 2024-02-15 DIAGNOSIS — R91.1 LUNG NODULE SEEN ON IMAGING STUDY: Primary | ICD-10-CM

## 2024-02-15 DIAGNOSIS — I10 PRIMARY HYPERTENSION: ICD-10-CM

## 2024-02-15 NOTE — TELEPHONE ENCOUNTER
Patient called and stated that when she went and saw Dr. Jj, he found a nodule on her lung.  He tried to order an CT Chest  for here, but was unable.  Pt has [facility] insurance and needs to have it here.  She wanted to know if you could possibly order one for her.     Yes, please order CT chest with and without contrast, re: pulmonary nodule     Appointment made for March 8, 2024 at 1:00pm.  Patient notified and also notified of need for blood work.

## 2024-03-03 LAB
CREAT SERPL-MCNC: 1.09 MG/DL (ref 0.55–1.02)
EGFR (NO RACE VARIABLE) (RUSH/TITUS): 57 ML/MIN/1.73M2

## 2024-03-03 PROCEDURE — 82565 ASSAY OF CREATININE: CPT | Mod: ,,, | Performed by: CLINICAL MEDICAL LABORATORY

## 2024-03-03 PROCEDURE — 36415 COLL VENOUS BLD VENIPUNCTURE: CPT | Mod: ,,, | Performed by: CLINICAL MEDICAL LABORATORY

## 2024-03-08 ENCOUNTER — HOSPITAL ENCOUNTER (OUTPATIENT)
Dept: RADIOLOGY | Facility: HOSPITAL | Age: 64
Discharge: HOME | End: 2024-03-08
Attending: INTERNAL MEDICINE
Payer: COMMERCIAL

## 2024-03-08 DIAGNOSIS — R91.1 LUNG NODULE SEEN ON IMAGING STUDY: ICD-10-CM

## 2024-03-08 PROCEDURE — 25500020 PHARM REV CODE 255: Performed by: INTERNAL MEDICINE

## 2024-03-08 PROCEDURE — 71270 CT THORAX DX C-/C+: CPT | Mod: TC

## 2024-03-08 PROCEDURE — 71270 CT THORAX DX C-/C+: CPT | Mod: 26,,, | Performed by: RADIOLOGY

## 2024-03-08 RX ADMIN — IOPAMIDOL 100 ML: 755 INJECTION, SOLUTION INTRAVENOUS at 01:03

## 2024-04-02 ENCOUNTER — OFFICE VISIT (OUTPATIENT)
Dept: FAMILY MEDICINE | Facility: CLINIC | Age: 64
End: 2024-04-02
Payer: COMMERCIAL

## 2024-04-02 VITALS
DIASTOLIC BLOOD PRESSURE: 72 MMHG | HEIGHT: 66 IN | WEIGHT: 283 LBS | HEART RATE: 58 BPM | BODY MASS INDEX: 45.48 KG/M2 | RESPIRATION RATE: 18 BRPM | SYSTOLIC BLOOD PRESSURE: 118 MMHG | OXYGEN SATURATION: 95 % | TEMPERATURE: 98 F

## 2024-04-02 DIAGNOSIS — J45.909 ASTHMA, UNSPECIFIED ASTHMA SEVERITY, UNSPECIFIED WHETHER COMPLICATED, UNSPECIFIED WHETHER PERSISTENT: ICD-10-CM

## 2024-04-02 DIAGNOSIS — K21.9 GASTROESOPHAGEAL REFLUX DISEASE, UNSPECIFIED WHETHER ESOPHAGITIS PRESENT: ICD-10-CM

## 2024-04-02 DIAGNOSIS — E04.1 THYROID NODULE: Primary | ICD-10-CM

## 2024-04-02 LAB
T3FREE SERPL-MCNC: 2.46 PG/ML (ref 2.18–3.98)
T4 FREE SERPL-MCNC: 1.29 NG/DL (ref 0.76–1.46)
TSH SERPL DL<=0.005 MIU/L-ACNC: 1.74 UIU/ML (ref 0.36–3.74)

## 2024-04-02 PROCEDURE — 1159F MED LIST DOCD IN RCRD: CPT | Mod: ,,, | Performed by: NURSE PRACTITIONER

## 2024-04-02 PROCEDURE — 3078F DIAST BP <80 MM HG: CPT | Mod: ,,, | Performed by: NURSE PRACTITIONER

## 2024-04-02 PROCEDURE — 99214 OFFICE O/P EST MOD 30 MIN: CPT | Mod: ,,, | Performed by: NURSE PRACTITIONER

## 2024-04-02 PROCEDURE — 4010F ACE/ARB THERAPY RXD/TAKEN: CPT | Mod: ,,, | Performed by: NURSE PRACTITIONER

## 2024-04-02 PROCEDURE — 84481 FREE ASSAY (FT-3): CPT | Mod: ,,, | Performed by: CLINICAL MEDICAL LABORATORY

## 2024-04-02 PROCEDURE — 3008F BODY MASS INDEX DOCD: CPT | Mod: ,,, | Performed by: NURSE PRACTITIONER

## 2024-04-02 PROCEDURE — 84443 ASSAY THYROID STIM HORMONE: CPT | Mod: ,,, | Performed by: CLINICAL MEDICAL LABORATORY

## 2024-04-02 PROCEDURE — 84439 ASSAY OF FREE THYROXINE: CPT | Mod: ,,, | Performed by: CLINICAL MEDICAL LABORATORY

## 2024-04-02 PROCEDURE — 3074F SYST BP LT 130 MM HG: CPT | Mod: ,,, | Performed by: NURSE PRACTITIONER

## 2024-04-02 PROCEDURE — 1160F RVW MEDS BY RX/DR IN RCRD: CPT | Mod: ,,, | Performed by: NURSE PRACTITIONER

## 2024-04-02 RX ORDER — AMIODARONE HYDROCHLORIDE 200 MG/1
200 TABLET ORAL DAILY
COMMUNITY
Start: 2024-02-19

## 2024-04-02 RX ORDER — PANTOPRAZOLE SODIUM 40 MG/1
TABLET, DELAYED RELEASE ORAL
Qty: 90 TABLET | Refills: 1 | Status: SHIPPED | OUTPATIENT
Start: 2024-04-02

## 2024-04-02 RX ORDER — BUDESONIDE AND FORMOTEROL FUMARATE DIHYDRATE 160; 4.5 UG/1; UG/1
AEROSOL RESPIRATORY (INHALATION)
Qty: 10.2 G | Refills: 5 | Status: SHIPPED | OUTPATIENT
Start: 2024-04-02

## 2024-04-02 NOTE — PROGRESS NOTES
[facility]    Chief Complaint      Chief Complaint   Patient presents with    Results     CT results review; pt assumed this was what her visit was for today, states she was unaware about a healthy you appt; that she typically writes down her appt's & this one was not- pt is not fasting  Upon reviewing pt's chart, pt was advised to f/u with pcp for abnormal thyroid findings via Dr Jj[facility] [place].    Health Maintenance     Covid/shingles/RSV vaccines not available at clinic       History of Present Illness      Kat Nam is a 63 y.o. female. She  has a past medical history of Asthma, Bilateral leg pain, Chronic low back pain, Compression of vein, Digestive disorder, Edema of lower extremity, Gastroesophageal reflux disease, History of basal cell carcinoma (BCC) of skin (02/28/2022), phlebitis, thrombophlebitis, Hyperlipidemia, Hypertension, Lymphedema, not elsewhere classified, Morbid obesity, Other skin changes, Peripheral edema, Peripheral vascular disease, unspecified, Postartificial menopausal syndrome, Premature atrial contraction, and Sleep apnea., who presents today for a review of her CT results- she recently had a possible nodule on her lung from her chest xray that was all ordered by the specialist.  Patient has been referred to Pulmonology already by her specialist. There was also a finding of a nodule to the L lobe of her Thyroid that was recommended to be further evaluated with ultrasound.     The schedule shows patient for a Healthy You but patient states she was unaware of this and does not want to do her Health You visit today.     Past Medical History:  Past Medical History:   Diagnosis Date    Asthma     Bilateral leg pain     Chronic low back pain     Compression of vein     Iliac vein compression syndrome    Digestive disorder     Edema of lower extremity     Gastroesophageal reflux disease     History of basal cell carcinoma (BCC) of  skin 2022    Hx of phlebitis     Hx of thrombophlebitis     Hyperlipidemia     Hypertension     Lymphedema, not elsewhere classified     Morbid obesity     Other skin changes     Peripheral edema     Peripheral vascular disease, unspecified     Postartificial menopausal syndrome     Premature atrial contraction     Sleep apnea        Past Surgical History:   has a past surgical history that includes  section; Cholecystectomy; Spine surgery (); Hysterectomy; Arthroscopy of knee (Left, 2021); Excision of medial meniscus of knee (Left, 2021); Knee arthroscopy w/ meniscectomy (Left, 2021); Excision basal cell carcinoma (2022); echocardiogram,transesophageal (N/A, 2023); and Treatment of cardiac arrhythmia (N/A, 2023).    Social History:  Social History     Tobacco Use    Smoking status: Never     Passive exposure: Past    Smokeless tobacco: Never   Substance Use Topics    Alcohol use: Never    Drug use: Never       I personally reviewed all past medical, surgical, and social.     Review of Systems   Constitutional:  Negative for fatigue and unexpected weight change.   HENT:  Negative for trouble swallowing.    Eyes:  Negative for visual disturbance.   Respiratory:  Negative for cough and shortness of breath.    Cardiovascular: Negative.    Gastrointestinal:  Negative for abdominal pain, constipation and diarrhea.   Genitourinary:  Negative for difficulty urinating.   Musculoskeletal:  Negative for gait problem.   Skin: Negative.    Neurological:  Negative for dizziness, weakness, light-headedness and headaches.        Medications:  Outpatient Encounter Medications as of 2024   Medication Sig Dispense Refill    albuterol (PROVENTIL/VENTOLIN HFA) 90 mcg/actuation inhaler INHALE TWO (2) PUFFS INTO THE LUNGS EVERY FOUR (4) HOURS AS NEEDED FOR WHEEZING. 18 g 5    amiodarone (PACERONE) 200 MG Tab Take 200 mg by mouth once daily.      aspirin 81 MG Chew Take 81 mg by mouth  once daily.      ezetimibe (ZETIA) 10 mg tablet Take 1 tablet (10 mg total) by mouth once daily. 90 tablet 3    fluticasone propionate (FLONASE) 50 mcg/actuation nasal spray 2 sprays (100 mcg total) by Each Nostril route once daily. 16 g 5    furosemide (LASIX) 20 MG tablet TAKE 1 TABLET BY MOUTH EACH MORNING FOR FLUID OR SWELLING 90 tablet 1    losartan (COZAAR) 25 MG tablet Take 1 tablet (25 mg total) by mouth once daily. 90 tablet 3    metoprolol succinate (TOPROL-XL) 50 MG 24 hr tablet Take 50 mg by mouth.      rivaroxaban (XARELTO) 20 mg Tab Take 20 mg by mouth.      [DISCONTINUED] pantoprazole (PROTONIX) 40 MG tablet TAKE ONE (1) TABLET (40 MG TOTAL) BY MOUTH ONCE DAILY. 90 tablet 1    [DISCONTINUED] SYMBICORT 160-4.5 mcg/actuation HFAA INHALE TWO (2) PUFFS BY MOUTH INTO THE LUNGS EVERY 12 (TWELVE) HOURS. 10.2 g 5    digoxin (LANOXIN) 125 mcg tablet Take 1 tablet (0.125 mg total) by mouth once daily. (Patient not taking: Reported on 10/9/2023) 90 tablet 1    pantoprazole (PROTONIX) 40 MG tablet TAKE ONE (1) TABLET (40 MG TOTAL) BY MOUTH ONCE DAILY. 90 tablet 1    SYMBICORT 160-4.5 mcg/actuation HFAA INHALE TWO (2) PUFFS BY MOUTH INTO THE LUNGS EVERY 12 (TWELVE) HOURS. 10.2 g 5    [DISCONTINUED] benzonatate (TESSALON) 100 MG capsule Take 1 capsule (100 mg total) by mouth 3 (three) times daily as needed for Cough. (Patient not taking: Reported on 4/2/2024) 30 capsule 0    [DISCONTINUED] ipratropium (ATROVENT) 21 mcg (0.03 %) nasal spray 2 sprays by Each Nostril route 2 (two) times daily. (Patient not taking: Reported on 4/2/2024) 30 mL 0     No facility-administered encounter medications on file as of 4/2/2024.       Allergies:  Review of patient's allergies indicates:   Allergen Reactions    Poppy Swelling    Aldomet amanda hcl injection      Headache    Methyldopa      Other reaction(s): Unknown       Health Maintenance:    There is no immunization history on file for this patient.   Health Maintenance   Topic  "Date Due    TETANUS VACCINE  Never done    Shingles Vaccine (1 of 2) Never done    Colorectal Cancer Screening  07/18/2025    Lipid Panel  07/31/2028    Hepatitis C Screening  Completed    Mammogram  Discontinued        Physical Exam      Vital Signs  Temp: 98.2 °F (36.8 °C)  Temp Source: Oral  Pulse: (!) 58  Resp: 18  SpO2: 95 %  BP: 118/72  BP Location: Right arm  Patient Position: Sitting  Pain Score: 0-No pain  Height and Weight  Height: 5' 6" (167.6 cm)  Weight: 128.4 kg (283 lb)  BSA (Calculated - sq m): 2.44 sq meters  BMI (Calculated): 45.7  Weight in (lb) to have BMI = 25: 154.6]    Physical Exam  Vitals and nursing note reviewed.   Constitutional:       Appearance: Normal appearance. She is well-developed.   HENT:      Head: Normocephalic.      Right Ear: Hearing normal.      Left Ear: Hearing normal.      Nose: Nose normal.   Eyes:      General: Lids are normal.      Conjunctiva/sclera: Conjunctivae normal.   Neck:      Thyroid: No thyroid mass, thyromegaly or thyroid tenderness.      Comments: No palpable mass noted to thyroid  Cardiovascular:      Rate and Rhythm: Bradycardia present. Rhythm irregular.      Heart sounds: Normal heart sounds.      Comments: Patient has known history of PAC's  Pulmonary:      Effort: Pulmonary effort is normal.      Breath sounds: Normal breath sounds.   Musculoskeletal:         General: Normal range of motion.      Cervical back: Normal range of motion and neck supple.      Right lower leg: No edema.      Left lower leg: No edema.   Skin:     General: Skin is warm and dry.   Neurological:      Mental Status: She is alert and oriented to person, place, and time.      Gait: Gait is intact.   Psychiatric:         Behavior: Behavior is cooperative.          Laboratory:  CBC:  Recent Labs   Lab 02/09/23  0250 02/10/23  0345 07/31/23  0902   WBC 4.83 5.68 10.43   RBC 4.08 L 4.08 L 4.70   Hemoglobin 11.8 L 11.8 L 13.3   Hematocrit 37.9 L 36.7 L 43.1   Platelet Count 206 219 319 "   MCV 92.9 90.0 91.7   MCH 28.9 28.9 28.3   MCHC 31.1 L 32.2 30.9 L     CMP:  Recent Labs   Lab 09/28/22  0932 02/07/23  0455 02/08/23  0318 07/31/23  0902   Glucose 86 124 H   < > 94   Calcium 9.3 9.6   < > 9.6   Albumin 3.9 4.6  --  3.6   Total Protein 7.0 7.9  --  7.0   Sodium 138 135 L   < > 140   Potassium 5.4 H 5.2 H   < > 4.2   CO2 28 22   < > 28   Chloride 104 100   < > 106   BUN 23 H 21 H   < > 20 H   Alk Phos 108 98  --  105   ALT 15 21  --  16   AST 10 L 18  --  11 L   Bilirubin, Total 0.3 0.7  --  0.4    < > = values in this interval not displayed.     LIPIDS:  Recent Labs   Lab 09/28/22  0932 02/07/23  0455 02/10/23  0345 07/31/23  0902   TSH  --  1.810  --   --    HDL Cholesterol 58  --  49 51   Cholesterol 204 H  --  163 214 H   Triglycerides 109  --  91 161 H   LDL Calculated 124  --  96 131   Cholesterol/HDL Ratio (Risk Factor) 3.5  --  3.3 4.2   Non-  --  114 163     TSH:  Recent Labs   Lab 02/07/23 0455   TSH 1.810     A1C:        Assessment/Plan     Kat Nam is a 63 y.o.female with:     1. Thyroid nodule  -     Thyroid Panel; Future; Expected date: 04/02/2024  -     T3, Free; Future; Expected date: 04/02/2024  -     US Thyroid; Future; Expected date: 04/02/2024    2. Gastroesophageal reflux disease, unspecified whether esophagitis present  -     pantoprazole (PROTONIX) 40 MG tablet; TAKE ONE (1) TABLET (40 MG TOTAL) BY MOUTH ONCE DAILY.  Dispense: 90 tablet; Refill: 1    3. Asthma, unspecified asthma severity, unspecified whether complicated, unspecified whether persistent  -     SYMBICORT 160-4.5 mcg/actuation HFAA; INHALE TWO (2) PUFFS BY MOUTH INTO THE LUNGS EVERY 12 (TWELVE) HOURS.  Dispense: 10.2 g; Refill: 5       CT and Xray results reviewed in patient's chart    Total time spent face-to-face and non-face-to-face coordinating care for this encounter was: 30 minutes     Chronic conditions status updated as per HPI.  Other than changes above, cont current medications and  maintain follow up with specialists.  Return to clinic in 6 month(s).    Rona Hidalgo, MOP  [facility]

## 2024-04-03 ENCOUNTER — PATIENT OUTREACH (OUTPATIENT)
Dept: ADMINISTRATIVE | Facility: HOSPITAL | Age: 64
End: 2024-04-03

## 2024-04-03 ENCOUNTER — TELEPHONE (OUTPATIENT)
Dept: FAMILY MEDICINE | Facility: CLINIC | Age: 64
End: 2024-04-03
Payer: COMMERCIAL

## 2024-04-03 NOTE — TELEPHONE ENCOUNTER
Patient notified that labs were normal and that her US has been ordered, she voiced understanding.

## 2024-04-03 NOTE — TELEPHONE ENCOUNTER
----- Message from JERICA Murphy sent at 4/3/2024  9:07 AM CDT -----  Her labs are normal- ultrasound has been ordered

## 2024-04-03 NOTE — PROGRESS NOTES
Population Health Chart Review & Patient Outreach Details      Further Action Needed If Patient Returns Outreach:      Post visit Population Health review of encounter with date of service  24 with Rocky. Not  All required HY components in encounter per office notes this is  not a HY see below.   The schedule shows patient for a Healthy You but patient states she was unaware of this and does not want to do her Health You visit today.   Followup appt for: Pt needs appt for  HY sent to [facility] to schedule via one note.      Updates Requested / Reviewed:     []  Care Everywhere    []     []  External Sources ([facility], [facility], [facility], etc.)    [] [facility]   [] [facility]   [] Other:    []  Care Team Updated   []  Removed  or Duplicate Orders   []  Immunization Reconciliation Completed / Queried    [] [place]   [] [place]   [] [place]   [] [place]      Health Maintenance Topics Addressed and Outreach Outcomes / Actions Taken:             Breast Cancer Screening []  Mammogram Order Placed    []  Mammogram Screening Scheduled    []  External Records Requested & Care Team Updated if Applicable    []  External Records Uploaded & Care Team Updated if Applicable    []  Pt Declined Scheduling Mammogram    []  Pt Will Schedule with External Provider / Order Routed & Care Team Updated if Applicable              Cervical Cancer Screening []  Pap Smear Scheduled in Primary Care or OBGYN    []  External Records Requested & Care Team Updated if Applicable       []  External Records Uploaded, Care Team Updated, & History Updated if Applicable    []  Patient Declined Scheduling Pap Smear    []  Patient Will Schedule with External Provider & Care Team Updated if Applicable                  Colorectal Cancer Screening []  Colonoscopy Case Request / Referral / Home Test Order Placed    []  External Records Requested & Care Team Updated if Applicable    []  External Records Uploaded, Care Team Updated, & History Updated  if Applicable    []  Patient Declined Completing Colon Cancer Screening    []  Patient Will Schedule with External Provider & Care Team Updated if Applicable    []  Fit Kit Mailed (add the SmartPhrase under additional notes)    []  Reminded Patient to Complete Home Test                Diabetic Eye Exam []  Eye Exam Screening Order Placed    []  Eye Camera Scheduled or Optometry/Ophthalmology Referral Placed    []  External Records Requested & Care Team Updated if Applicable    []  External Records Uploaded, Care Team Updated, & History Updated if Applicable    []  Patient Declined Scheduling Eye Exam    []  Patient Will Schedule with External Provider & Care Team Updated if Applicable             Blood Pressure Control []  Primary Care Follow Up Visit Scheduled     []  Remote Blood Pressure Reading Captured    []  Patient Declined Remote Reading or Scheduling Appt - Escalated to PCP    []  Patient Will Call Back or Send Portal Message with Reading                 HbA1c & Other Labs []  Overdue Lab(s) Ordered    []  Overdue Lab(s) Scheduled    []  External Records Uploaded & Care Team Updated if Applicable    []  Primary Care Follow Up Visit Scheduled     []  Reminded Patient to Complete A1c Home Test    []  Patient Declined Scheduling Labs or Will Call Back to Schedule    []  Patient Will Schedule with External Provider / Order Routed, & Care Team Updated if Applicable           Primary Care Appointment []  Primary Care Appt Scheduled    []  Patient Declined Scheduling or Will Call Back to Schedule    []  Pt Established with External Provider, Updated Care Team, & Informed Pt to Notify Payor if Applicable           Medication Adherence /    Statin Use []  Primary Care Appointment Scheduled    []  Patient Reminded to  Prescription    []  Patient Declined, Provider Notified if Needed    []  Sent Provider Message to Review to Evaluate Pt for Statin, Add Exclusion Dx Codes, Document   Exclusion in Problem List,  Change Statin Intensity Level to Moderate or High Intensity if Applicable                Osteoporosis Screening []  Dexa Order Placed    []  Dexa Appointment Scheduled    []  External Records Requested & Care Team Updated    []  External Records Uploaded, Care Team Updated, & History Updated if Applicable    []  Patient Declined Scheduling Dexa or Will Call Back to Schedule    []  Patient Will Schedule with External Provider / Order Routed & Care Team Updated if Applicable       Additional Notes:.

## 2024-04-10 ENCOUNTER — HOSPITAL ENCOUNTER (OUTPATIENT)
Dept: RADIOLOGY | Facility: HOSPITAL | Age: 64
Discharge: HOME | End: 2024-04-10
Attending: NURSE PRACTITIONER
Payer: COMMERCIAL

## 2024-04-10 DIAGNOSIS — E04.1 THYROID NODULE: ICD-10-CM

## 2024-04-10 PROCEDURE — 76536 US EXAM OF HEAD AND NECK: CPT | Mod: 26,,, | Performed by: RADIOLOGY

## 2024-04-10 PROCEDURE — 76536 US EXAM OF HEAD AND NECK: CPT | Mod: TC

## 2024-04-15 DIAGNOSIS — E04.1 THYROID NODULE: Primary | ICD-10-CM

## 2024-04-19 ENCOUNTER — TELEPHONE (OUTPATIENT)
Dept: FAMILY MEDICINE | Facility: CLINIC | Age: 64
End: 2024-04-19
Payer: COMMERCIAL

## 2024-04-19 NOTE — TELEPHONE ENCOUNTER
----- Message from JERICA Murphy sent at 4/15/2024  8:46 AM CDT -----  Thyroid shows a nodule on L lobe of thyroid and a cystic nodule on the R lobe- Recommends further studies of L nodule.  I have referred her to Dr. Richardson for this.

## 2024-04-19 NOTE — TELEPHONE ENCOUNTER
Notified pt of results showing a nodule on L lobe of thyroid and a cystic nodule on the R lobe- provider recommends further studies of L nodule and has ordered a referral for her to Dr. Richardson for this. Pt verbalized understanding and stated she has already been contacted by Debra's office and has appt made for next month with him.

## 2024-04-23 ENCOUNTER — OFFICE VISIT (OUTPATIENT)
Dept: PULMONOLOGY | Facility: CLINIC | Age: 64
End: 2024-04-23
Payer: COMMERCIAL

## 2024-04-23 VITALS
DIASTOLIC BLOOD PRESSURE: 85 MMHG | WEIGHT: 283 LBS | HEART RATE: 62 BPM | RESPIRATION RATE: 18 BRPM | SYSTOLIC BLOOD PRESSURE: 206 MMHG | BODY MASS INDEX: 45.48 KG/M2 | OXYGEN SATURATION: 100 % | HEIGHT: 66 IN

## 2024-04-23 DIAGNOSIS — R91.1 SOLITARY PULMONARY NODULE: Primary | ICD-10-CM

## 2024-04-23 DIAGNOSIS — E04.1 THYROID NODULE: ICD-10-CM

## 2024-04-23 DIAGNOSIS — J45.20 MILD INTERMITTENT ASTHMA WITHOUT COMPLICATION: ICD-10-CM

## 2024-04-23 DIAGNOSIS — G47.33 OBSTRUCTIVE SLEEP APNEA: ICD-10-CM

## 2024-04-23 PROCEDURE — 99204 OFFICE O/P NEW MOD 45 MIN: CPT | Mod: S$PBB,,, | Performed by: STUDENT IN AN ORGANIZED HEALTH CARE EDUCATION/TRAINING PROGRAM

## 2024-04-23 PROCEDURE — 99214 OFFICE O/P EST MOD 30 MIN: CPT | Mod: PBBFAC | Performed by: STUDENT IN AN ORGANIZED HEALTH CARE EDUCATION/TRAINING PROGRAM

## 2024-04-23 PROCEDURE — 4010F ACE/ARB THERAPY RXD/TAKEN: CPT | Mod: ,,, | Performed by: STUDENT IN AN ORGANIZED HEALTH CARE EDUCATION/TRAINING PROGRAM

## 2024-04-23 PROCEDURE — 3079F DIAST BP 80-89 MM HG: CPT | Mod: ,,, | Performed by: STUDENT IN AN ORGANIZED HEALTH CARE EDUCATION/TRAINING PROGRAM

## 2024-04-23 PROCEDURE — 1159F MED LIST DOCD IN RCRD: CPT | Mod: ,,, | Performed by: STUDENT IN AN ORGANIZED HEALTH CARE EDUCATION/TRAINING PROGRAM

## 2024-04-23 PROCEDURE — 3077F SYST BP >= 140 MM HG: CPT | Mod: ,,, | Performed by: STUDENT IN AN ORGANIZED HEALTH CARE EDUCATION/TRAINING PROGRAM

## 2024-04-23 PROCEDURE — 3008F BODY MASS INDEX DOCD: CPT | Mod: ,,, | Performed by: STUDENT IN AN ORGANIZED HEALTH CARE EDUCATION/TRAINING PROGRAM

## 2024-04-23 NOTE — PROGRESS NOTES
Patient Name: Kat Nam   Primary Care Provider: Bony Mary MD   Date of Service: 04/23/2024   Reason for Referral:  Lung nodule    Chief Complaint: No chief complaint on file.      Subjective:      Kat Nam is 63 y.o. female with With past medical history significant for apparent asthma (no pulmonary function tests on file), chronic lower back pain, gastroesophageal reflux disease history of basal cell carcinoma of the skin, phlebitis, atrial fibrillation and sleep apnea who presents for evaluation of lung nodule seen    Initial clinic visit 4/23/24   Presents for evaluation of lung nodules as seen below.  She presents today stating that she had a chest x-ray performed initially (for screening as she was on amiodarone.  This led to the calcified granuloma being seen on the chest x-ray, leading to a CT chest with and without contrast.  It was their that the small nodules were seen.  She otherwise has no weight loss, hemoptysis, worsening shortness of breath etc..  She has a history of childhood asthma (severe asthma to the age of 12, improved and then had another severe period of asthma in her 40s).  At this time she has on a controller Symbicort inhaler and albuterol as a rescue inhaler.  Her symptoms have improved significantly over the last dear and she has not had any events of continued ongoing wheezing, exacerbations recently.  She is compliant with her CPAP and has a history of obstructive sleep apnea.      Personal Diagnostics Review  I personally reviewed and interpreted the following   Images from 2/16/24 (CT chest) were reviewed by me personally which showed evidence of 3-4 mm nodular densities (not very obvious on CT images -one is present in the posterior right upper lobe).  By my estimation, she had enlarged station 4R (12.6 mm without any other significant mediastinal and hilar adenopathy.  I also noted a granulomatous calcification in the left lower lobe measuring about 8.8 mm  She also had heterogeneous lesion in the left thyroid lobe.  This is followed up by an thyroid ultrasound and she has been recommended to get an FNA for this as there was a solid nodule in the left thyroid lobe.        Assessment and Plan      Lung nodules, 3-4 mm      Assessment:  She has a lifelong nonsmoker.  The risk calculator ([facility]-3-13% risk), susie calculator is less than 1% risk.  The left lower lobe calcified area has a benign appearance (patient tells me that she has had this and she was 16 years old).    Plan  Low risk lung nodules, repeat CT scan in six months' time.  If continues to remain stable then we will obtain a one year CT scan of the chest.  If still stable, can consider discontinuation of surveillance.        History of asthma  History of obstructive sleep apnea on CPAP      Assessment:  History of asthma, childhood asthma with seasonal allergies.  Very well controlled over the last couple of years.  Last pulmonary function tests more than 10 years ago.  Continues on Symbicort scheduled.    Plan  Asked to move over to Symbicort on a p.r.n. basis based on guidelines, she will go back to using Symbicort scheduled if she has any worsening of her symptoms  Complete PFTs with pre and post bronchodilator testing and 6 minute walk test  Compliant with CPAP, we will continue using this for obstructive sleep apnea      Thyroid nodule      Assessment:  Tells me she has a follow up appointment in a few weeks for consideration of biopsy    Plan  Defer to ordering physician, patient aware of follow up           Follow-up   Follow-up in four weeks' time for follow up regarding pulmonary function tests, 6 minute walk test and response to changing Symbicort to p.r.n. use    Cooper Sun MD  Interventional Pulmonary and Critical Care  [facility]      Problem List Items Addressed This Visit    None          Past Medical History:   Diagnosis Date    Asthma     Bilateral leg pain     Chronic  low back pain     Compression of vein     Iliac vein compression syndrome    Digestive disorder     Edema of lower extremity     Gastroesophageal reflux disease     History of basal cell carcinoma (BCC) of skin 2022    Hx of phlebitis     Hx of thrombophlebitis     Hyperlipidemia     Hypertension     Lymphedema, not elsewhere classified     Morbid obesity     Other skin changes     Peripheral edema     Peripheral vascular disease, unspecified     Postartificial menopausal syndrome     Premature atrial contraction     Sleep apnea       Past Surgical History:   Procedure Laterality Date    ARTHROSCOPY OF KNEE Left 2021    Procedure: ARTHROSCOPY, KNEE;  Surgeon: Noman Huerta MD;  Location: [facility];  Service: Orthopedics;  Laterality: Left;    BASAL CELL CARCINOMA EXCISION  2022    left eyebrow ([facility] w/ Dr. Garcia)     SECTION      CHOLECYSTECTOMY      ECHOCARDIOGRAM,TRANSESOPHAGEAL N/A 2023    Procedure: Transesophageal echo (PHILIPPE) intra-procedure log documentation;  Surgeon: Sonu Smith MD;  Location: [facility] CATH LAB;  Service: Cardiology;  Laterality: N/A;    EXCISION OF MEDIAL MENISCUS OF KNEE Left 2021    Procedure: MENISCECTOMY, KNEE, MEDIAL;  Surgeon: Noman Huerta MD;  Location: [facility];  Service: Orthopedics;  Laterality: Left;    HYSTERECTOMY      KNEE ARTHROSCOPY W/ MENISCECTOMY Left 2021    Procedure: ARTHROSCOPY, KNEE, WITH MENISCECTOMY;  Surgeon: Noman Huerta MD;  Location: [facility];  Service: Orthopedics;  Laterality: Left;  LATERAL MENISECTOMY    SPINE SURGERY      Lumbar Laminectomy, L4-L5    TREATMENT OF CARDIAC ARRHYTHMIA N/A 2023    Procedure: Cardioversion or Defibrillation;  Surgeon: Sonu Smith MD;  Location: [facility] CATH LAB;  Service: Cardiology;  Laterality: N/A;     Family History   Problem Relation Name Age of Onset    Diabetes Mellitus Mother      Heart disease Father      Hypertension  "Father      Diabetes Mellitus Sister      Diabetes Mellitus Brother      Heart disease Brother       Review of patient's allergies indicates:   Allergen Reactions    Poppy Swelling    Aldomet amanda hcl injection      Headache    Methyldopa      Other reaction(s): Unknown      Social History     Tobacco Use    Smoking status: Never     Passive exposure: Past    Smokeless tobacco: Never   Substance Use Topics    Alcohol use: Never    Drug use: Never      Review of Systems       Objective:      Physical Exam  Constitutional:       General: She is not in acute distress.     Appearance: Normal appearance. She is obese. She is not ill-appearing or diaphoretic.   HENT:      Head: Normocephalic and atraumatic.      Nose: No congestion or rhinorrhea.      Mouth/Throat:      Mouth: Mucous membranes are moist.   Cardiovascular:      Rate and Rhythm: Normal rate and regular rhythm.      Heart sounds: Normal heart sounds.   Pulmonary:      Effort: Pulmonary effort is normal. No respiratory distress.      Breath sounds: Normal breath sounds. No stridor. No wheezing, rhonchi or rales.   Chest:      Chest wall: No tenderness.   Abdominal:      General: Abdomen is flat.   Musculoskeletal:      Cervical back: Normal range of motion. No rigidity.      Right lower leg: No edema.      Left lower leg: No edema.   Skin:     General: Skin is warm.      Findings: No erythema.   Neurological:      General: No focal deficit present.      Mental Status: She is alert and oriented to person, place, and time. Mental status is at baseline.   Psychiatric:         Mood and Affect: Mood normal.         Behavior: Behavior normal.         Thought Content: Thought content normal.                4/2/2024     8:45 AM 1/23/2024    10:09 AM 12/4/2023    10:28 AM 10/9/2023     3:11 PM 10/3/2023     9:52 AM 7/3/2023     9:47 AM 4/24/2023     9:09 AM   Pulmonary Function Tests   SpO2 95 % 98 % 95 % 98 % 96 % 98 % 96 %   Height 5' 6" (1.676 m) 5' 6" (1.676 m) " "5' 6" (1.676 m) 5' 6" (1.676 m) 5' 6" (1.676 m) 5' 6" (1.676 m) 5' 6" (1.676 m)   Weight 128.4 kg (283 lb) 128.8 kg (284 lb) 128.8 kg (284 lb) 129.3 kg (285 lb) 131.5 kg (290 lb) 129.7 kg (286 lb) 133.4 kg (294 lb)   BMI (Calculated) 45.7 45.9 45.9 46 46.8 46.2 47.5         Outpatient Encounter Medications as of 4/23/2024   Medication Sig Dispense Refill    albuterol (PROVENTIL/VENTOLIN HFA) 90 mcg/actuation inhaler INHALE TWO (2) PUFFS INTO THE LUNGS EVERY FOUR (4) HOURS AS NEEDED FOR WHEEZING. 18 g 5    amiodarone (PACERONE) 200 MG Tab Take 200 mg by mouth once daily.      aspirin 81 MG Chew Take 81 mg by mouth once daily.      digoxin (LANOXIN) 125 mcg tablet Take 1 tablet (0.125 mg total) by mouth once daily. (Patient not taking: Reported on 10/9/2023) 90 tablet 1    ezetimibe (ZETIA) 10 mg tablet Take 1 tablet (10 mg total) by mouth once daily. 90 tablet 3    fluticasone propionate (FLONASE) 50 mcg/actuation nasal spray 2 sprays (100 mcg total) by Each Nostril route once daily. 16 g 5    furosemide (LASIX) 20 MG tablet TAKE 1 TABLET BY MOUTH EACH MORNING FOR FLUID OR SWELLING 90 tablet 1    losartan (COZAAR) 25 MG tablet Take 1 tablet (25 mg total) by mouth once daily. 90 tablet 3    metoprolol succinate (TOPROL-XL) 50 MG 24 hr tablet Take 50 mg by mouth.      pantoprazole (PROTONIX) 40 MG tablet TAKE ONE (1) TABLET (40 MG TOTAL) BY MOUTH ONCE DAILY. 90 tablet 1    rivaroxaban (XARELTO) 20 mg Tab Take 20 mg by mouth.      SYMBICORT 160-4.5 mcg/actuation HFAA INHALE TWO (2) PUFFS BY MOUTH INTO THE LUNGS EVERY 12 (TWELVE) HOURS. 10.2 g 5     No facility-administered encounter medications on file as of 4/23/2024.       Assessment & Plan    As above                                          No orders of the defined types were placed in this encounter.                  "

## 2024-05-06 ENCOUNTER — OFFICE VISIT (OUTPATIENT)
Dept: SURGERY | Facility: CLINIC | Age: 64
End: 2024-05-06
Payer: COMMERCIAL

## 2024-05-06 DIAGNOSIS — E04.1 THYROID NODULE: ICD-10-CM

## 2024-05-06 PROCEDURE — 1159F MED LIST DOCD IN RCRD: CPT | Mod: ,,, | Performed by: STUDENT IN AN ORGANIZED HEALTH CARE EDUCATION/TRAINING PROGRAM

## 2024-05-06 PROCEDURE — 99213 OFFICE O/P EST LOW 20 MIN: CPT | Mod: PBBFAC | Performed by: STUDENT IN AN ORGANIZED HEALTH CARE EDUCATION/TRAINING PROGRAM

## 2024-05-06 PROCEDURE — 4010F ACE/ARB THERAPY RXD/TAKEN: CPT | Mod: ,,, | Performed by: STUDENT IN AN ORGANIZED HEALTH CARE EDUCATION/TRAINING PROGRAM

## 2024-05-06 PROCEDURE — 99204 OFFICE O/P NEW MOD 45 MIN: CPT | Mod: S$PBB,,, | Performed by: STUDENT IN AN ORGANIZED HEALTH CARE EDUCATION/TRAINING PROGRAM

## 2024-05-07 NOTE — PROGRESS NOTES
History & Physical    Subjective     History of Present Illness:  63-year-old  female presents the clinic for evaluation for thyroid nodule.  Patient was filling some fullness in her neck and her primary care provider ordered a thyroid ultrasound which showed a solid heterogeneous nodule with suggestion of punctate echogenic foci demonstrated within the left thyroid lobe measuring up to 4.3 cm; cystic 2.6 mm right thyroid nodule; free T3, free T4 and TSH all unremarkable.  Patient states she does have family history of some thyroid disease in her daughter, but no history of thyroid cancer.  Patient denies any hoarseness or any dysphagia.  Patient is on Xarelto for atrial fibrillation; underwent ablation in 2023 at [facility]; does follow with cardiology with Dr. Wyman.  Past surgical history of cholecystectomy, hysterectomy,  x3, back surgery.  Currently, Denies any chest pain/shortness of breath, nausea/vomiting/diarrhea, fever/chills.    No chief complaint on file.      Review of patient's allergies indicates:   Allergen Reactions    Poppy Swelling    Aldomet amanda hcl injection      Headache    Methyldopa      Other reaction(s): Unknown       Current Outpatient Medications   Medication Sig Dispense Refill    albuterol (PROVENTIL/VENTOLIN HFA) 90 mcg/actuation inhaler INHALE TWO (2) PUFFS INTO THE LUNGS EVERY FOUR (4) HOURS AS NEEDED FOR WHEEZING. 18 g 5    amiodarone (PACERONE) 200 MG Tab Take 200 mg by mouth once daily.      aspirin 81 MG Chew Take 81 mg by mouth once daily.      digoxin (LANOXIN) 125 mcg tablet Take 1 tablet (0.125 mg total) by mouth once daily. 90 tablet 1    ezetimibe (ZETIA) 10 mg tablet Take 1 tablet (10 mg total) by mouth once daily. 90 tablet 3    fluticasone propionate (FLONASE) 50 mcg/actuation nasal spray 2 sprays (100 mcg total) by Each Nostril route once daily. 16 g 5    furosemide (LASIX) 20 MG tablet TAKE 1 TABLET BY MOUTH EACH MORNING FOR FLUID OR  SWELLING 90 tablet 1    losartan (COZAAR) 25 MG tablet Take 1 tablet (25 mg total) by mouth once daily. 90 tablet 3    metoprolol succinate (TOPROL-XL) 50 MG 24 hr tablet Take 50 mg by mouth.      pantoprazole (PROTONIX) 40 MG tablet TAKE ONE (1) TABLET (40 MG TOTAL) BY MOUTH ONCE DAILY. 90 tablet 1    rivaroxaban (XARELTO) 20 mg Tab Take 20 mg by mouth.      SYMBICORT 160-4.5 mcg/actuation HFAA INHALE TWO (2) PUFFS BY MOUTH INTO THE LUNGS EVERY 12 (TWELVE) HOURS. 10.2 g 5     No current facility-administered medications for this visit.       Past Medical History:   Diagnosis Date    Asthma     Bilateral leg pain     Chronic low back pain     Compression of vein     Iliac vein compression syndrome    Digestive disorder     Edema of lower extremity     Gastroesophageal reflux disease     History of basal cell carcinoma (BCC) of skin 2022    Hx of phlebitis     Hx of thrombophlebitis     Hyperlipidemia     Hypertension     Lymphedema, not elsewhere classified     Morbid obesity     Other skin changes     Peripheral edema     Peripheral vascular disease, unspecified     Postartificial menopausal syndrome     Premature atrial contraction     Sleep apnea      Past Surgical History:   Procedure Laterality Date    ARTHROSCOPY OF KNEE Left 2021    Procedure: ARTHROSCOPY, KNEE;  Surgeon: Noman Huerta MD;  Location: [facility] OR;  Service: Orthopedics;  Laterality: Left;    BASAL CELL CARCINOMA EXCISION  2022    left eyebrow ([facility] w/ Dr. Garcia)     SECTION      CHOLECYSTECTOMY      ECHOCARDIOGRAM,TRANSESOPHAGEAL N/A 2023    Procedure: Transesophageal echo (PHILIPPE) intra-procedure log documentation;  Surgeon: Sonu Smith MD;  Location: [facility] CATH LAB;  Service: Cardiology;  Laterality: N/A;    EXCISION OF MEDIAL MENISCUS OF KNEE Left 2021    Procedure: MENISCECTOMY, KNEE, MEDIAL;  Surgeon: Noman Huerta MD;  Location: [facility] OR;  Service: Orthopedics;  Laterality:  Left;    HYSTERECTOMY      KNEE ARTHROSCOPY W/ MENISCECTOMY Left 06/08/2021    Procedure: ARTHROSCOPY, KNEE, WITH MENISCECTOMY;  Surgeon: Noman Huerta MD;  Location: [facility];  Service: Orthopedics;  Laterality: Left;  LATERAL MENISECTOMY    SPINE SURGERY  1998    Lumbar Laminectomy, L4-L5    TREATMENT OF CARDIAC ARRHYTHMIA N/A 2/8/2023    Procedure: Cardioversion or Defibrillation;  Surgeon: Sonu Smith MD;  Location: [facility] CATH LAB;  Service: Cardiology;  Laterality: N/A;     Family History   Problem Relation Name Age of Onset    Diabetes Mellitus Mother      Heart disease Father      Hypertension Father      Diabetes Mellitus Sister      Diabetes Mellitus Brother      Heart disease Brother       Social History     Tobacco Use    Smoking status: Never     Passive exposure: Past    Smokeless tobacco: Never   Substance Use Topics    Alcohol use: Never    Drug use: Never        Review of Systems:  Review of Systems   Constitutional: Negative.  Negative for fatigue and unexpected weight change.   HENT: Negative.  Negative for trouble swallowing.    Eyes: Negative.    Respiratory: Negative.  Negative for chest tightness and shortness of breath.    Cardiovascular: Negative.  Negative for chest pain.   Gastrointestinal: Negative.  Negative for abdominal pain, blood in stool and nausea.   Endocrine: Negative.    Genitourinary: Negative.  Negative for hematuria.   Musculoskeletal: Negative.  Negative for back pain and myalgias.   Neurological: Negative.  Negative for dizziness, speech difficulty, weakness and light-headedness.   Psychiatric/Behavioral: Negative.  Negative for agitation and behavioral problems.           Objective     Vital Signs (Most Recent)              Physical Exam:  Physical Exam  Constitutional:       General: She is not in acute distress.     Appearance: Normal appearance.   HENT:      Head: Normocephalic.   Neck:      Thyroid: Thyroid tenderness present.        Comments:  Tenderness to palpation on the right side of the neck; could palpate increased nodularity on the left thyroid lobe  Cardiovascular:      Rate and Rhythm: Normal rate.   Pulmonary:      Effort: Pulmonary effort is normal. No respiratory distress.   Abdominal:      General: There is no distension.      Tenderness: There is no abdominal tenderness.   Musculoskeletal:         General: Normal range of motion.   Skin:     General: Skin is warm.      Coloration: Skin is not jaundiced.   Neurological:      General: No focal deficit present.      Mental Status: She is alert and oriented to person, place, and time.      Cranial Nerves: No cranial nerve deficit.            Assessment and Plan   Thyroid nodule    PLAN:    We will send the patient to Interventional Radiology for left thyroid nodule FNA.      Once we get the pathology results, we will contact the patient and discuss options.

## 2024-05-08 ENCOUNTER — TELEPHONE (OUTPATIENT)
Dept: SURGERY | Facility: CLINIC | Age: 64
End: 2024-05-08
Payer: COMMERCIAL

## 2024-05-08 DIAGNOSIS — E04.1 THYROID NODULE: Primary | ICD-10-CM

## 2024-05-08 NOTE — TELEPHONE ENCOUNTER
----- Message from Shabbir Richardson DO sent at 5/7/2024  8:45 AM CDT -----  Set the patient up for IR guided left thyroid nodule FNA    FNA scheduled for 5/14/24 at 0800. Pt notified.

## 2024-05-14 ENCOUNTER — HOSPITAL ENCOUNTER (OUTPATIENT)
Dept: RADIOLOGY | Facility: HOSPITAL | Age: 64
Discharge: HOME | End: 2024-05-14
Attending: STUDENT IN AN ORGANIZED HEALTH CARE EDUCATION/TRAINING PROGRAM
Payer: COMMERCIAL

## 2024-05-14 VITALS
HEART RATE: 62 BPM | SYSTOLIC BLOOD PRESSURE: 164 MMHG | DIASTOLIC BLOOD PRESSURE: 71 MMHG | OXYGEN SATURATION: 97 % | RESPIRATION RATE: 20 BRPM

## 2024-05-14 DIAGNOSIS — E04.1 THYROID NODULE: ICD-10-CM

## 2024-05-14 PROCEDURE — 88173 CYTOPATH EVAL FNA REPORT: CPT | Mod: 26,,, | Performed by: PATHOLOGY

## 2024-05-14 PROCEDURE — 88305 TISSUE EXAM BY PATHOLOGIST: CPT | Mod: 26,,, | Performed by: PATHOLOGY

## 2024-05-14 PROCEDURE — 10005 FNA BX W/US GDN 1ST LES: CPT

## 2024-05-14 PROCEDURE — 10005 FNA BX W/US GDN 1ST LES: CPT | Mod: ,,, | Performed by: STUDENT IN AN ORGANIZED HEALTH CARE EDUCATION/TRAINING PROGRAM

## 2024-05-14 PROCEDURE — 88305 TISSUE EXAM BY PATHOLOGIST: CPT | Mod: TC,MCY | Performed by: STUDENT IN AN ORGANIZED HEALTH CARE EDUCATION/TRAINING PROGRAM

## 2024-05-14 NOTE — PROGRESS NOTES
FNA left thyroid  Performed by Dr. Mickey Hernandez  Consent obtained for left thyroid nodule FNA.  A formal timeout was called all staff present agree to patient and procedure.  The neck was prepped with ChloraPrep and sterile field was established.  1% lidocaine was used as local anesthetic.  Under ultrasound guidance 5 FNA samples were taken from the nodule in the left thyroid.  The puncture site was cleaned and bandaged.  The patient tolerated the procedure well there were no immediate postprocedure complications.

## 2024-05-15 LAB
ESTROGEN SERPL-MCNC: NORMAL PG/ML
INSULIN SERPL-ACNC: NORMAL U[IU]/ML
LAB AP CLINICAL INFORMATION: NORMAL
LAB AP COMMENTS: NORMAL
LAB AP GROSS DESCRIPTION: NORMAL
T3RU NFR SERPL: NORMAL %

## 2024-05-22 ENCOUNTER — CLINICAL SUPPORT (OUTPATIENT)
Dept: PULMONOLOGY | Facility: HOSPITAL | Age: 64
End: 2024-05-22
Attending: FAMILY MEDICINE
Payer: COMMERCIAL

## 2024-05-22 ENCOUNTER — OFFICE VISIT (OUTPATIENT)
Dept: PULMONOLOGY | Facility: CLINIC | Age: 64
End: 2024-05-22
Payer: COMMERCIAL

## 2024-05-22 VITALS
RESPIRATION RATE: 18 BRPM | DIASTOLIC BLOOD PRESSURE: 70 MMHG | OXYGEN SATURATION: 98 % | SYSTOLIC BLOOD PRESSURE: 148 MMHG | WEIGHT: 283 LBS | BODY MASS INDEX: 45.48 KG/M2 | BODY MASS INDEX: 45.48 KG/M2 | OXYGEN SATURATION: 98 % | HEART RATE: 59 BPM | WEIGHT: 283 LBS | HEIGHT: 66 IN | HEIGHT: 66 IN

## 2024-05-22 DIAGNOSIS — R91.1 SOLITARY PULMONARY NODULE: ICD-10-CM

## 2024-05-22 DIAGNOSIS — E04.1 THYROID NODULE: ICD-10-CM

## 2024-05-22 DIAGNOSIS — R91.1 SOLITARY PULMONARY NODULE: Primary | ICD-10-CM

## 2024-05-22 DIAGNOSIS — J45.20 MILD INTERMITTENT ASTHMA WITHOUT COMPLICATION: ICD-10-CM

## 2024-05-22 DIAGNOSIS — G47.33 OBSTRUCTIVE SLEEP APNEA: ICD-10-CM

## 2024-05-22 PROCEDURE — 94618 PULMONARY STRESS TESTING: CPT

## 2024-05-22 PROCEDURE — 1159F MED LIST DOCD IN RCRD: CPT | Mod: ,,, | Performed by: STUDENT IN AN ORGANIZED HEALTH CARE EDUCATION/TRAINING PROGRAM

## 2024-05-22 PROCEDURE — 94729 DIFFUSING CAPACITY: CPT | Mod: 26,,, | Performed by: STUDENT IN AN ORGANIZED HEALTH CARE EDUCATION/TRAINING PROGRAM

## 2024-05-22 PROCEDURE — 99213 OFFICE O/P EST LOW 20 MIN: CPT | Mod: 25,S$PBB,, | Performed by: STUDENT IN AN ORGANIZED HEALTH CARE EDUCATION/TRAINING PROGRAM

## 2024-05-22 PROCEDURE — 94729 DIFFUSING CAPACITY: CPT

## 2024-05-22 PROCEDURE — 99215 OFFICE O/P EST HI 40 MIN: CPT | Mod: PBBFAC,25 | Performed by: STUDENT IN AN ORGANIZED HEALTH CARE EDUCATION/TRAINING PROGRAM

## 2024-05-22 PROCEDURE — 94726 PLETHYSMOGRAPHY LUNG VOLUMES: CPT | Mod: 26,,, | Performed by: STUDENT IN AN ORGANIZED HEALTH CARE EDUCATION/TRAINING PROGRAM

## 2024-05-22 PROCEDURE — 3078F DIAST BP <80 MM HG: CPT | Mod: ,,, | Performed by: STUDENT IN AN ORGANIZED HEALTH CARE EDUCATION/TRAINING PROGRAM

## 2024-05-22 PROCEDURE — 94618 PULMONARY STRESS TESTING: CPT | Mod: 26,,, | Performed by: STUDENT IN AN ORGANIZED HEALTH CARE EDUCATION/TRAINING PROGRAM

## 2024-05-22 PROCEDURE — 4010F ACE/ARB THERAPY RXD/TAKEN: CPT | Mod: ,,, | Performed by: STUDENT IN AN ORGANIZED HEALTH CARE EDUCATION/TRAINING PROGRAM

## 2024-05-22 PROCEDURE — 94060 EVALUATION OF WHEEZING: CPT

## 2024-05-22 PROCEDURE — 94060 EVALUATION OF WHEEZING: CPT | Mod: 26,59,, | Performed by: STUDENT IN AN ORGANIZED HEALTH CARE EDUCATION/TRAINING PROGRAM

## 2024-05-22 PROCEDURE — 3008F BODY MASS INDEX DOCD: CPT | Mod: ,,, | Performed by: STUDENT IN AN ORGANIZED HEALTH CARE EDUCATION/TRAINING PROGRAM

## 2024-05-22 PROCEDURE — 3077F SYST BP >= 140 MM HG: CPT | Mod: ,,, | Performed by: STUDENT IN AN ORGANIZED HEALTH CARE EDUCATION/TRAINING PROGRAM

## 2024-05-22 PROCEDURE — 27100098 HC SPACER

## 2024-05-22 NOTE — PROCEDURES
SIX MINUTE WALK DISTANCE  BASELINE VITALS  BP:  149/86  HR:  55  SPO2:  99% on room air  Modified DINORA Dyspnea Scale Score:  5  Modified DINORA Fatigue Scale Score:  1    END OF STUDY VITALS:  BP:  187/95  HR:  92  SPO2:  96% room air  Modified DINORA Dyspnea Scale Score:  8  Modified DINORA Fatigue Scale Score:  6    Patient walked 1165 ft which is 98% of expected distance  SpO2 nancy was 95 % at 0 minutes.   Oxygen was not used      Cooper Sun MD  Interventional Pulmonary and Critical Care  [facility]

## 2024-05-22 NOTE — PROGRESS NOTES
Patient Name: Kat Nam   Primary Care Provider: Bnoy Mary MD   Date of Service: 05/22/2024   Reason for Referral:  Lung nodule    Chief Complaint: Pulmonary Nodules (PFT/6 min walk done today. Not having any issues.)      Subjective:      Kat Nam is 63 y.o. female with With past medical history significant for apparent asthma (no pulmonary function tests on file), chronic lower back pain, gastroesophageal reflux disease history of basal cell carcinoma of the skin, phlebitis, atrial fibrillation and sleep apnea who presents for evaluation of lung nodule seen      Follow up clinic visit 5/22/24   PFTs with evidence of obstructive sleep apnea, borderline bronchodilator response.  Patient feels well, using Symbicort 3 times a week.        Initial clinic visit 4/23/24   Presents for evaluation of lung nodules as seen below.  She presents today stating that she had a chest x-ray performed initially (for screening as she was on amiodarone.  This led to the calcified granuloma being seen on the chest x-ray, leading to a CT chest with and without contrast.  It was their that the small nodules were seen.  She otherwise has no weight loss, hemoptysis, worsening shortness of breath etc..  She has a history of childhood asthma (severe asthma to the age of 12, improved and then had another severe period of asthma in her 40s).  At this time she has on a controller Symbicort inhaler and albuterol as a rescue inhaler.  Her symptoms have improved significantly over the last dear and she has not had any events of continued ongoing wheezing, exacerbations recently.  She is compliant with her CPAP and has a history of obstructive sleep apnea.      Personal Diagnostics Review  I personally reviewed and interpreted the following   Images from 2/16/24 (CT chest) were reviewed by me personally which showed evidence of 3-4 mm nodular densities (not very obvious on CT images -one is present in the posterior right  upper lobe).  By my estimation, she had enlarged station 4R (12.6 mm without any other significant mediastinal and hilar adenopathy.  I also noted a granulomatous calcification in the left lower lobe measuring about 8.8 mm She also had heterogeneous lesion in the left thyroid lobe.  This is followed up by an thyroid ultrasound and she has been recommended to get an FNA for this as there was a solid nodule in the left thyroid lobe.        Assessment and Plan      Lung nodules, 3-4 mm      Assessment:  She has a lifelong nonsmoker.  The risk calculator ([facility]-3-13% risk), susie calculator is less than 1% risk.  The left lower lobe calcified area has a benign appearance (patient tells me that she has had this and she was 16 years old).    Plan  Low risk lung nodules, repeat CT scan in six months' time from the initial 1.  If continues to remain stable then we will obtain a one year CT scan of the chest.  If still stable, can consider discontinuation of surveillance.        Asthma, well-controlled  Obstructive sleep apnea, on CPAP      Assessment:  History of asthma, childhood asthma with seasonal allergies.  Very well controlled over the last couple of years.  Last pulmonary function tests more than 10 years ago.  I moved her from scheduled Symbicort to p.r.n. Symbicort last visit, she continues to use this almost on a daily basis open 1 time per day).        Plan  Asked to trial Symbicort scheduled for 2 weeks and then go back to p.r.n. to see if there is a significant clinical difference.  If there is not, the patient will go back to using p.r.n. Symbicort.  She does have specific seasonal/environmental factors that exacerbate her symptoms    Thyroid nodule      Assessment:  Tells me she has a follow up appointment in a few weeks for consideration of biopsy    Plan  Defer to ordering physician, patient aware of follow up           Follow-up   Follow-up in 5 months' time to evaluate use of Symbicort, as well as  follow up from CT as above.       Cooper Sun MD  Interventional Pulmonary and Critical Care  [facility]      Problem List Items Addressed This Visit    None          Past Medical History:   Diagnosis Date    Asthma     Bilateral leg pain     Chronic low back pain     Compression of vein     Iliac vein compression syndrome    Digestive disorder     Edema of lower extremity     Gastroesophageal reflux disease     History of basal cell carcinoma (BCC) of skin 2022    Hx of phlebitis     Hx of thrombophlebitis     Hyperlipidemia     Hypertension     Lymphedema, not elsewhere classified     Morbid obesity     Other skin changes     Peripheral edema     Peripheral vascular disease, unspecified     Postartificial menopausal syndrome     Premature atrial contraction     Sleep apnea       Past Surgical History:   Procedure Laterality Date    ARTHROSCOPY OF KNEE Left 2021    Procedure: ARTHROSCOPY, KNEE;  Surgeon: Noman Huerta MD;  Location: [facility] OR;  Service: Orthopedics;  Laterality: Left;    BASAL CELL CARCINOMA EXCISION  2022    left eyebrow ([facility] w/ Dr. Garcia)     SECTION      CHOLECYSTECTOMY      ECHOCARDIOGRAM,TRANSESOPHAGEAL N/A 2023    Procedure: Transesophageal echo (PHILIPPE) intra-procedure log documentation;  Surgeon: Sonu Smith MD;  Location: [facility] CATH LAB;  Service: Cardiology;  Laterality: N/A;    EXCISION OF MEDIAL MENISCUS OF KNEE Left 2021    Procedure: MENISCECTOMY, KNEE, MEDIAL;  Surgeon: Noman Huerta MD;  Location: [facility];  Service: Orthopedics;  Laterality: Left;    HYSTERECTOMY      KNEE ARTHROSCOPY W/ MENISCECTOMY Left 2021    Procedure: ARTHROSCOPY, KNEE, WITH MENISCECTOMY;  Surgeon: Noman Huerta MD;  Location: [facility];  Service: Orthopedics;  Laterality: Left;  LATERAL MENISECTOMY    SPINE SURGERY      Lumbar Laminectomy, L4-L5    TREATMENT OF CARDIAC ARRHYTHMIA N/A 2023     Procedure: Cardioversion or Defibrillation;  Surgeon: Sonu Smith MD;  Location: [facility] CATH LAB;  Service: Cardiology;  Laterality: N/A;     Family History   Problem Relation Name Age of Onset    Diabetes Mellitus Mother      Heart disease Father      Hypertension Father      Diabetes Mellitus Sister      Diabetes Mellitus Brother      Heart disease Brother       Review of patient's allergies indicates:   Allergen Reactions    Poppy Swelling    Aldomet amanda hcl injection      Headache    Methyldopa      Other reaction(s): Unknown      Social History     Tobacco Use    Smoking status: Never     Passive exposure: Past    Smokeless tobacco: Never   Substance Use Topics    Alcohol use: Never    Drug use: Never      Review of Systems       Objective:      Physical Exam  Constitutional:       General: She is not in acute distress.     Appearance: Normal appearance. She is obese. She is not ill-appearing or diaphoretic.   HENT:      Head: Normocephalic and atraumatic.      Nose: No congestion or rhinorrhea.      Mouth/Throat:      Mouth: Mucous membranes are moist.   Cardiovascular:      Rate and Rhythm: Normal rate and regular rhythm.      Heart sounds: Normal heart sounds.   Pulmonary:      Effort: Pulmonary effort is normal. No respiratory distress.      Breath sounds: Normal breath sounds. No stridor. No wheezing, rhonchi or rales.   Chest:      Chest wall: No tenderness.   Abdominal:      General: Abdomen is flat.   Musculoskeletal:      Cervical back: Normal range of motion. No rigidity.      Right lower leg: No edema.      Left lower leg: No edema.   Skin:     General: Skin is warm.      Findings: No erythema.   Neurological:      General: No focal deficit present.      Mental Status: She is alert and oriented to person, place, and time. Mental status is at baseline.   Psychiatric:         Mood and Affect: Mood normal.         Behavior: Behavior normal.         Thought Content: Thought content normal.  "               5/22/2024     9:34 AM 5/22/2024     8:30 AM 5/22/2024     8:00 AM 5/14/2024     8:21 AM 4/23/2024     9:13 AM 4/2/2024     8:45 AM 1/23/2024    10:09 AM   Pulmonary Function Tests   FVC  230 liters        FVC%  70        FEV1  1.84 liters        FEV1%  72        FEF 25-75  1.84        FEF 25-75%  83        TLC (liters)  3.54 liters        TLC%  66        RV  1.2        RV%  55        DLCO (ml/mmHg sec)  19.65 ml/mmHg sec        DLCO%  92        Peak Flow  354 L/min        FiO2 (%)  21 %        SpO2 98 % 98 %  97 % 100 % 95 % 98 %   Ordering Provider   NATHALIA DORSEY MD       Performing nurse/tech/RT   JOSH MCCOY CRT       Diagnosis   Shortness of Breath       Height 5' 6" (1.676 m)  5' 6" (1.676 m)  5' 6" (1.676 m) 5' 6" (1.676 m) 5' 6" (1.676 m)   Weight 128.4 kg (283 lb)  128.4 kg (283 lb)  128.4 kg (283 lb) 128.4 kg (283 lb) 128.8 kg (284 lb)   BMI (Calculated) 45.7  45.7  45.7 45.7 45.9   Patient Race          6MWT Status   completed without stopping       Patient Reported   No complaints       Was O2 used?   No       6MW Distance walked (feet)   1165 feet       Distance walked (meters)   355.09 meters       Did patient stop?   No       Type of assistive device(s) used?   no assistive devices       Oxygen Saturation   99 %       Supplemental Oxygen   Room Air       Heart Rate   55 bpm       Blood Pressure   147/86       Manan Dyspnea Rating    heavy       Oxygen Saturation   96 %       Supplemental Oxygen   Room Air       Heart Rate   92 bpm       Blood Pressure   187/95       Manan Dyspnea Rating    very heavy       Recovery Time (seconds)   60 seconds       Oxygen Saturation   98 %       Supplemental Oxygen   Room Air       Heart Rate   62 bpm       Blood Pressure   159/89       Manan Dyspnea Rating    somewhat heavy             Outpatient Encounter Medications as of 5/22/2024   Medication Sig Dispense Refill    albuterol (PROVENTIL/VENTOLIN HFA) 90 mcg/actuation inhaler INHALE TWO (2) " PUFFS INTO THE LUNGS EVERY FOUR (4) HOURS AS NEEDED FOR WHEEZING. 18 g 5    amiodarone (PACERONE) 200 MG Tab Take 200 mg by mouth once daily.      aspirin 81 MG Chew Take 81 mg by mouth once daily.      ezetimibe (ZETIA) 10 mg tablet Take 1 tablet (10 mg total) by mouth once daily. 90 tablet 3    furosemide (LASIX) 20 MG tablet TAKE 1 TABLET BY MOUTH EACH MORNING FOR FLUID OR SWELLING 90 tablet 1    losartan (COZAAR) 25 MG tablet Take 1 tablet (25 mg total) by mouth once daily. 90 tablet 3    metoprolol succinate (TOPROL-XL) 50 MG 24 hr tablet Take 50 mg by mouth.      pantoprazole (PROTONIX) 40 MG tablet TAKE ONE (1) TABLET (40 MG TOTAL) BY MOUTH ONCE DAILY. 90 tablet 1    rivaroxaban (XARELTO) 20 mg Tab Take 20 mg by mouth.      SYMBICORT 160-4.5 mcg/actuation HFAA INHALE TWO (2) PUFFS BY MOUTH INTO THE LUNGS EVERY 12 (TWELVE) HOURS. 10.2 g 5    amiodarone (PACERONE) 200 MG Tab Take 1 tablet (200 mg total) by mouth once daily. (Patient not taking: Reported on 5/22/2024) 90 tablet 1    digoxin (LANOXIN) 125 mcg tablet Take 1 tablet (0.125 mg total) by mouth once daily. (Patient not taking: Reported on 5/22/2024) 90 tablet 1    fluticasone propionate (FLONASE) 50 mcg/actuation nasal spray 2 sprays (100 mcg total) by Each Nostril route once daily. (Patient not taking: Reported on 5/22/2024) 16 g 5    losartan (COZAAR) 25 MG tablet Take 1 tablet (25 mg total) by mouth once daily. (Patient not taking: Reported on 5/22/2024) 90 tablet 0    metoprolol succinate (TOPROL-XL) 50 MG 24 hr tablet Take 1 tablet (50 mg total) by mouth once daily. (Patient not taking: Reported on 5/22/2024) 90 tablet 0    rivaroxaban (XARELTO) 20 mg Tab Take 1 tablet (20 mg total) by mouth daily with dinner. (Patient not taking: Reported on 5/22/2024) 90 tablet 1     No facility-administered encounter medications on file as of 5/22/2024.       Assessment & Plan    As above                                          No orders of the defined types  were placed in this encounter.

## 2024-05-22 NOTE — PROCEDURES
PFT REPORT:  SPIROMETRY:  The pre-BD FVC is moderately reduced, 2.3 L/70 % predicted  The pre-BD FEV1 is moderately reduced, 1.84 L/72 % predicted.  The pre-BD FEV1/FVC ratio is preserved,  80/101 % predicted    The post-BD FVC is moderately reduced, 2.37 L/72 % predicted  The post-BD FEV1 is moderately reduced, 2.0 L/78 % predicted  The post-BD FEV1/FVC ratio is preserved,  84/106 % predicted    There is no significant bronchodilator effect.  Borderline change 9% change in FEV1 following bronchodilator administration.    LUNG VOLUMES:  The TLC is reduced, 3.54 L/66 % predicted.  The RV is not elevated, in 1.2 L/55 % predicted.  The RV/TLC is not elevated, 34/83 % predicted.  The FRC is not elevated, 2.03 L/66 % predicted.  The ERV is not elevated, 0.83 L/93 % predicted.      DIFFUSION CAPACITY:  The diffusion capacity is corrected for hemoglobin and is normal, 19.65/92 % predicted.    Interpretation:    Spirometry is impaired.    Lung volumes are impaired.  Diffusion capacity is normal.  The combination of above results suggest preserved ratio impaired spirometry, with evidence of restrictive lung disease.  In combination with normal DLCO and borderline change, she may have obesity hypoventilation syndrome/obstructive sleep apnea and/or evidence of asthma/reactive airway disease.  Please clinically correlate.    Cooper Sun MD  Interventional Pulmonary and Critical Care  [facility]

## 2024-05-28 ENCOUNTER — TELEPHONE (OUTPATIENT)
Dept: SURGERY | Facility: CLINIC | Age: 64
End: 2024-05-28
Payer: COMMERCIAL

## 2024-05-28 RX ORDER — FUROSEMIDE 20 MG/1
TABLET ORAL
Qty: 90 TABLET | Refills: 1 | Status: SHIPPED | OUTPATIENT
Start: 2024-05-28

## 2024-05-28 NOTE — TELEPHONE ENCOUNTER
----- Message from Rona Lazcano sent at 5/28/2024 10:14 AM CDT -----  Who Called: Kat Nam    Caller is requesting information on test results.    Name of Test (Lab/Mammo/Etc): biopsy results  Ordering Provider: vega    Call back # [phone]    Preferred Method of Contact: Phone Call  Patient's Preferred Phone Number on File: [phone]   Best Call Back Number, if different:  Additional Information:    Tried returning pt call, no answer, v/m not set up.

## 2024-05-28 NOTE — TELEPHONE ENCOUNTER
----- Message from Isaura Whiteside sent at 5/28/2024  3:53 PM CDT -----  Regarding: RESULTS  Who Called: Kat Nam    Patient is returning phone call    Who Left Message for Patient:ALAYNA ROBISON  Does the patient know what this is regarding?:TEST RESULTS      Preferred Method of Contact: Phone Call  Patient's Preferred Phone Number on File: [phone]   Best Call Back Number, if different:  Additional Information:    Informed pt of biopsy results.

## 2024-05-30 DIAGNOSIS — E04.1 THYROID NODULE: Primary | ICD-10-CM

## 2024-06-17 ENCOUNTER — OFFICE VISIT (OUTPATIENT)
Dept: CARDIOLOGY | Facility: CLINIC | Age: 64
End: 2024-06-17
Payer: COMMERCIAL

## 2024-06-17 VITALS
HEIGHT: 66 IN | OXYGEN SATURATION: 99 % | HEART RATE: 57 BPM | SYSTOLIC BLOOD PRESSURE: 150 MMHG | BODY MASS INDEX: 47.09 KG/M2 | WEIGHT: 293 LBS | DIASTOLIC BLOOD PRESSURE: 90 MMHG

## 2024-06-17 DIAGNOSIS — E66.01 MORBID OBESITY: ICD-10-CM

## 2024-06-17 DIAGNOSIS — I10 PRIMARY HYPERTENSION: ICD-10-CM

## 2024-06-17 DIAGNOSIS — G47.33 OBSTRUCTIVE SLEEP APNEA OF ADULT: ICD-10-CM

## 2024-06-17 DIAGNOSIS — R00.0 TACHYARRHYTHMIA: ICD-10-CM

## 2024-06-17 DIAGNOSIS — E04.1 THYROID NODULE: ICD-10-CM

## 2024-06-17 DIAGNOSIS — I49.1 PAC (PREMATURE ATRIAL CONTRACTION): Primary | ICD-10-CM

## 2024-06-17 DIAGNOSIS — I87.1 MAY-THURNER SYNDROME: ICD-10-CM

## 2024-06-17 DIAGNOSIS — I48.0 PAROXYSMAL ATRIAL FIBRILLATION: ICD-10-CM

## 2024-06-17 PROCEDURE — 3080F DIAST BP >= 90 MM HG: CPT | Mod: ,,, | Performed by: INTERNAL MEDICINE

## 2024-06-17 PROCEDURE — 1160F RVW MEDS BY RX/DR IN RCRD: CPT | Mod: ,,, | Performed by: INTERNAL MEDICINE

## 2024-06-17 PROCEDURE — 3077F SYST BP >= 140 MM HG: CPT | Mod: ,,, | Performed by: INTERNAL MEDICINE

## 2024-06-17 PROCEDURE — 99999 PR PBB SHADOW E&M-EST. PATIENT-LVL V: CPT | Mod: PBBFAC,,, | Performed by: INTERNAL MEDICINE

## 2024-06-17 PROCEDURE — 99214 OFFICE O/P EST MOD 30 MIN: CPT | Mod: S$PBB,,, | Performed by: INTERNAL MEDICINE

## 2024-06-17 PROCEDURE — 3008F BODY MASS INDEX DOCD: CPT | Mod: ,,, | Performed by: INTERNAL MEDICINE

## 2024-06-17 PROCEDURE — 1159F MED LIST DOCD IN RCRD: CPT | Mod: ,,, | Performed by: INTERNAL MEDICINE

## 2024-06-17 PROCEDURE — 99215 OFFICE O/P EST HI 40 MIN: CPT | Mod: PBBFAC | Performed by: INTERNAL MEDICINE

## 2024-06-17 PROCEDURE — 93005 ELECTROCARDIOGRAM TRACING: CPT | Mod: PBBFAC | Performed by: INTERNAL MEDICINE

## 2024-06-17 PROCEDURE — 4010F ACE/ARB THERAPY RXD/TAKEN: CPT | Mod: ,,, | Performed by: INTERNAL MEDICINE

## 2024-06-17 PROCEDURE — 93010 ELECTROCARDIOGRAM REPORT: CPT | Mod: S$PBB,,, | Performed by: INTERNAL MEDICINE

## 2024-06-17 NOTE — ASSESSMENT & PLAN NOTE
Symptoms have resolved following A fib  ablation, would like to stop Xarlto, will place on Zio, consider changing to ASA 81 mg q d alone if no a fib.

## 2024-06-17 NOTE — ASSESSMENT & PLAN NOTE
Awaiting biopsy schedule, will hold Xarlto for 48 hours before biopsy, resume day after biopsy if OK with gen surg, pt is low CV risk for planned procedure.    No

## 2024-06-17 NOTE — PATIENT INSTRUCTIONS
Increase Cozaar to 50 mg q d.  Increase exercise to five days a week  Zio, if no A fib will consider DC Xaralto   Follow up in one month, sooner if symptoms change.

## 2024-06-17 NOTE — PROGRESS NOTES
PCP: Bony Mary MD    Referring Provider:     Subjective:   Kat Nam is a 63 y.o. female, nonsmoker, with hx of HTn, HLD, GERD, who presents for evaluation of atrial fibrillation.  Pt reports palpitations have resolved after she was referred to Dr. Jj, underwent A fib ablation, has maintained NSR as far as she can tell.   She has resumed normal activity without provocation of chest pain, pressure or shortness of breath, is riding stationary bike x 20 mins three times a week. .  She states she had very rare palpitations and quivering associated with shortness of breath for many years, have resolved following ablation.   Pt has new thyroid nodule, awaiting biopsy schedule.     CTV 2020:  30-32% narrowing of the left common iliac vein as it passes dorsal to the right common iliac artery, significance uncertain                         No deep venous thrombosis of the pelvic veins the shunt. No acute process otherwise as detailed above     EKG 22:  Sinus rhythm with blocked premature atrial complexes.   ECHO 2020:  Ejection fraction  55-60(%). Mild pulmonic regurgitation       Past Surgical History:   Procedure Laterality Date    ARTHROSCOPY OF KNEE Left 2021    Procedure: ARTHROSCOPY, KNEE;  Surgeon: Noman Huerta MD;  Location: [facility] OR;  Service: Orthopedics;  Laterality: Left;    BASAL CELL CARCINOMA EXCISION  2022    left eyebrow ([facility] w/ Dr. Garcia)     SECTION      CHOLECYSTECTOMY      ECHOCARDIOGRAM,TRANSESOPHAGEAL N/A 2023    Procedure: Transesophageal echo (PHILIPPE) intra-procedure log documentation;  Surgeon: Sonu Smith MD;  Location: [facility] CATH LAB;  Service: Cardiology;  Laterality: N/A;    EXCISION OF MEDIAL MENISCUS OF KNEE Left 2021    Procedure: MENISCECTOMY, KNEE, MEDIAL;  Surgeon: Noman Huerta MD;  Location: [facility] OR;  Service: Orthopedics;  Laterality: Left;    HYSTERECTOMY      KNEE ARTHROSCOPY W/ MENISCECTOMY  Left 06/08/2021    Procedure: ARTHROSCOPY, KNEE, WITH MENISCECTOMY;  Surgeon: Noman Huerta MD;  Location: [facility] OR;  Service: Orthopedics;  Laterality: Left;  LATERAL MENISECTOMY    SPINE SURGERY  1998    Lumbar Laminectomy, L4-L5    TREATMENT OF CARDIAC ARRHYTHMIA N/A 2/8/2023    Procedure: Cardioversion or Defibrillation;  Surgeon: Sonu Smith MD;  Location: [facility] CATH LAB;  Service: Cardiology;  Laterality: N/A;      Family History   Problem Relation Name Age of Onset    Diabetes Mellitus Mother      Heart disease Father      Hypertension Father      Diabetes Mellitus Sister      Diabetes Mellitus Brother      Heart disease Brother        Social History     Socioeconomic History    Marital status:    Tobacco Use    Smoking status: Never     Passive exposure: Past    Smokeless tobacco: Never   Substance and Sexual Activity    Alcohol use: Never    Drug use: Never     Social Determinants of Health     Financial Resource Strain: Low Risk  (2/8/2023)    Overall Financial Resource Strain (CARDIA)     Difficulty of Paying Living Expenses: Not hard at all   Food Insecurity: No Food Insecurity (2/8/2023)    Hunger Vital Sign     Worried About Running Out of Food in the Last Year: Never true     Ran Out of Food in the Last Year: Never true   Transportation Needs: No Transportation Needs (2/8/2023)    PRAPARE - Transportation     Lack of Transportation (Medical): No     Lack of Transportation (Non-Medical): No   Physical Activity: Insufficiently Active (2/8/2023)    Exercise Vital Sign     Days of Exercise per Week: 3 days     Minutes of Exercise per Session: 20 min   Stress: Stress Concern Present (2/8/2023)    [employer] [place] [employer] - Occupational Stress Questionnaire     Feeling of Stress : Very much   Housing Stability: Low Risk  (2/8/2023)    Housing Stability Vital Sign     Unable to Pay for Housing in the Last Year: No     Number of Places Lived in the Last Year: 1      Unstable Housing in the Last Year: No           Lab Results   Component Value Date     07/31/2023    K 4.2 07/31/2023     07/31/2023    CO2 28 07/31/2023    BUN 20 (H) 07/31/2023    CREATININE 1.09 (H) 03/03/2024    CALCIUM 9.6 07/31/2023    ANIONGAP 10 07/31/2023    ESTGFRAFRICA 74 03/25/2021    EGFRNONAA 57 (L) 06/24/2022       Lab Results   Component Value Date    CHOL 214 (H) 07/31/2023    CHOL 163 02/10/2023    CHOL 204 (H) 09/28/2022     Lab Results   Component Value Date    HDL 51 07/31/2023    HDL 49 02/10/2023    HDL 58 09/28/2022     Lab Results   Component Value Date    LDLCALC 131 07/31/2023    LDLCALC 96 02/10/2023    LDLCALC 124 09/28/2022     Lab Results   Component Value Date    TRIG 161 (H) 07/31/2023    TRIG 91 02/10/2023    TRIG 109 09/28/2022     Lab Results   Component Value Date    CHOLHDL 4.2 07/31/2023    CHOLHDL 3.3 02/10/2023    CHOLHDL 3.5 09/28/2022       Lab Results   Component Value Date    WBC 10.43 07/31/2023    HGB 13.3 07/31/2023    HCT 43.1 07/31/2023    MCV 91.7 07/31/2023     07/31/2023           Current Outpatient Medications:     albuterol (PROVENTIL/VENTOLIN HFA) 90 mcg/actuation inhaler, INHALE TWO (2) PUFFS INTO THE LUNGS EVERY FOUR (4) HOURS AS NEEDED FOR WHEEZING., Disp: 18 g, Rfl: 5    amiodarone (PACERONE) 200 MG Tab, Take 200 mg by mouth once daily., Disp: , Rfl:     aspirin 81 MG Chew, Take 81 mg by mouth once daily., Disp: , Rfl:     ezetimibe (ZETIA) 10 mg tablet, Take 1 tablet (10 mg total) by mouth once daily., Disp: 90 tablet, Rfl: 3    furosemide (LASIX) 20 MG tablet, TAKE 1 TABLET BY MOUTH EACH MORNING FOR FLUID OR SWELLING, Disp: 90 tablet, Rfl: 1    losartan (COZAAR) 25 MG tablet, Take 1 tablet (25 mg total) by mouth once daily., Disp: 90 tablet, Rfl: 3    metoprolol succinate (TOPROL-XL) 50 MG 24 hr tablet, Take 1 tablet (50 mg total) by mouth once daily., Disp: 90 tablet, Rfl: 0    pantoprazole (PROTONIX) 40 MG tablet, TAKE ONE (1)  "TABLET (40 MG TOTAL) BY MOUTH ONCE DAILY., Disp: 90 tablet, Rfl: 1    rivaroxaban (XARELTO) 20 mg Tab, Take 20 mg by mouth., Disp: , Rfl:     amiodarone (PACERONE) 200 MG Tab, Take 1 tablet (200 mg total) by mouth once daily. (Patient not taking: Reported on 6/17/2024), Disp: 90 tablet, Rfl: 1    budesonide-formoterol 160-4.5 mcg (SYMBICORT) 160-4.5 mcg/actuation HFAA, Inhale 2 puffs into the lungs twice daily, Disp: 10.2 g, Rfl: 5    digoxin (LANOXIN) 125 mcg tablet, Take 1 tablet (0.125 mg total) by mouth once daily. (Patient not taking: Reported on 5/22/2024), Disp: 90 tablet, Rfl: 1    fluticasone propionate (FLONASE) 50 mcg/actuation nasal spray, 2 sprays (100 mcg total) by Each Nostril route once daily. (Patient not taking: Reported on 5/22/2024), Disp: 16 g, Rfl: 5    losartan (COZAAR) 25 MG tablet, Take 1 tablet (25 mg total) by mouth once daily. (Patient not taking: Reported on 5/22/2024), Disp: 90 tablet, Rfl: 0    metoprolol succinate (TOPROL-XL) 50 MG 24 hr tablet, Take 50 mg by mouth., Disp: , Rfl:     rivaroxaban (XARELTO) 20 mg Tab, Take 1 tablet (20 mg total) by mouth daily with dinner. (Patient not taking: Reported on 6/17/2024), Disp: 90 tablet, Rfl: 1       Review of Systems   Respiratory:  Negative for cough and shortness of breath.    Cardiovascular:  Negative for chest pain, palpitations, orthopnea, claudication, leg swelling and PND.         Objective:   BP (!) 150/90 (BP Location: Right arm, Patient Position: Sitting)   Pulse (!) 57   Ht 5' 6" (1.676 m)   Wt 132.9 kg (293 lb)   SpO2 99%   BMI 47.29 kg/m²     Physical Exam  Constitutional:       General: She is not in acute distress.  Eyes:      Extraocular Movements: Extraocular movements intact.   Cardiovascular:      Rate and Rhythm: Normal rate and regular rhythm.      Pulses: Normal pulses.      Heart sounds: Normal heart sounds. No murmur heard.  Pulmonary:      Effort: Pulmonary effort is normal.      Breath sounds: Normal breath " sounds.   Abdominal:      Palpations: Abdomen is soft.   Musculoskeletal:         General: No swelling.      Cervical back: Neck supple.   Skin:     Findings: No rash.   Neurological:      Mental Status: She is alert and oriented to person, place, and time.           Assessment:     Atrial fib        sidney:          !. Paroxysmal A fib, symptoms resolved post ablation, will place on Zio, if no A fib would consider dc Xaralto, pt aware of increased stroke risk.                              2. Morbid obesity, discussed lifestyle changes    3. Hyperlipidemia, following with primary care, on Zetia, did not tolerate Statins, PT continues to consider Repatha, pt does not want to incur costs today,    4. Hypertension: controlled on current meds.     Please follow-up in one month, to review results of Zio and discuss discontinuing systemic anticoagulation.

## 2024-06-18 LAB
OHS QRS DURATION: 120 MS
OHS QTC CALCULATION: 457 MS

## 2024-07-26 ENCOUNTER — OFFICE VISIT (OUTPATIENT)
Dept: CARDIOLOGY | Facility: CLINIC | Age: 64
End: 2024-07-26
Payer: COMMERCIAL

## 2024-07-26 VITALS
SYSTOLIC BLOOD PRESSURE: 130 MMHG | HEART RATE: 55 BPM | BODY MASS INDEX: 46.45 KG/M2 | DIASTOLIC BLOOD PRESSURE: 80 MMHG | OXYGEN SATURATION: 99 % | WEIGHT: 289 LBS | HEIGHT: 66 IN

## 2024-07-26 DIAGNOSIS — I49.1 PAC (PREMATURE ATRIAL CONTRACTION): ICD-10-CM

## 2024-07-26 DIAGNOSIS — I10 PRIMARY HYPERTENSION: Primary | ICD-10-CM

## 2024-07-26 DIAGNOSIS — I48.0 PAROXYSMAL ATRIAL FIBRILLATION: ICD-10-CM

## 2024-07-26 DIAGNOSIS — G47.33 OBSTRUCTIVE SLEEP APNEA OF ADULT: ICD-10-CM

## 2024-07-26 PROCEDURE — 99214 OFFICE O/P EST MOD 30 MIN: CPT | Mod: PBBFAC | Performed by: INTERNAL MEDICINE

## 2024-07-26 PROCEDURE — 3075F SYST BP GE 130 - 139MM HG: CPT | Mod: ,,, | Performed by: INTERNAL MEDICINE

## 2024-07-26 PROCEDURE — 3008F BODY MASS INDEX DOCD: CPT | Mod: ,,, | Performed by: INTERNAL MEDICINE

## 2024-07-26 PROCEDURE — 99999 PR PBB SHADOW E&M-EST. PATIENT-LVL IV: CPT | Mod: PBBFAC,,, | Performed by: INTERNAL MEDICINE

## 2024-07-26 PROCEDURE — 99214 OFFICE O/P EST MOD 30 MIN: CPT | Mod: S$PBB,,, | Performed by: INTERNAL MEDICINE

## 2024-07-26 PROCEDURE — 3079F DIAST BP 80-89 MM HG: CPT | Mod: ,,, | Performed by: INTERNAL MEDICINE

## 2024-07-26 PROCEDURE — 4010F ACE/ARB THERAPY RXD/TAKEN: CPT | Mod: ,,, | Performed by: INTERNAL MEDICINE

## 2024-07-26 PROCEDURE — 1159F MED LIST DOCD IN RCRD: CPT | Mod: ,,, | Performed by: INTERNAL MEDICINE

## 2024-07-26 NOTE — PROGRESS NOTES
PCP: Bony Mary MD    Referring Provider:     Subjective:   Kat Nam is a 63 y.o. female with hx of atrial fibrillation with ablation, HTN, HLD, HENRY with CPAP and PVD who presents for 1 month follow up.     During her last appointment her Losartan was increased to 50mg once daily, she has not been monitoring her blood pressure at home since that increase but appears to be better controlled in office today. She reports feeling well and denies chest pain, chest tightness, palpitations, or dizziness. She has leg swelling that is chronic and unchanged, improves with elevation of feet.     She has been exercising on a stationary bike 25 minutes (about 3 miles) 4-5 days a week. She denies any chest pain, palpitations, or shortness of breath.       Fhx:   Family History   Problem Relation Name Age of Onset    Diabetes Mellitus Mother      Heart disease Father      Hypertension Father      Diabetes Mellitus Sister      Diabetes Mellitus Brother      Heart disease Brother        Shx:   Past Surgical History:   Procedure Laterality Date    ARTHROSCOPY OF KNEE Left 2021    Procedure: ARTHROSCOPY, KNEE;  Surgeon: Noman Huerta MD;  Location: [facility] OR;  Service: Orthopedics;  Laterality: Left;    BASAL CELL CARCINOMA EXCISION  2022    left eyebrow (Mohs w/ Dr. Garcia)     SECTION      CHOLECYSTECTOMY      ECHOCARDIOGRAM,TRANSESOPHAGEAL N/A 2023    Procedure: Transesophageal echo (PHILIPPE) intra-procedure log documentation;  Surgeon: Sonu Smith MD;  Location: [facility] CATH LAB;  Service: Cardiology;  Laterality: N/A;    EXCISION OF MEDIAL MENISCUS OF KNEE Left 2021    Procedure: MENISCECTOMY, KNEE, MEDIAL;  Surgeon: Noman Huerta MD;  Location: [facility] OR;  Service: Orthopedics;  Laterality: Left;    HYSTERECTOMY      KNEE ARTHROSCOPY W/ MENISCECTOMY Left 2021    Procedure: ARTHROSCOPY, KNEE, WITH MENISCECTOMY;  Surgeon: Noman Huerta MD;  Location:  [facility] ORTHO OR;  Service: Orthopedics;  Laterality: Left;  LATERAL MENISECTOMY    SPINE SURGERY  1998    Lumbar Laminectomy, L4-L5    TREATMENT OF CARDIAC ARRHYTHMIA N/A 2/8/2023    Procedure: Cardioversion or Defibrillation;  Surgeon: Sonu Smith MD;  Location: [facility] CATH LAB;  Service: Cardiology;  Laterality: N/A;            ECHO - Results for orders placed during the hospital encounter of 02/07/23    Echo    Interpretation Summary  · The left ventricle is normal in size with concentric remodeling and  · Atrial fibrillation observed.  · Mild tricuspid regurgitation.  · The estimated ejection fraction is 55%.  · Normal right ventricular size with normal right ventricular systolic function.       CATH - Results for orders placed during the hospital encounter of 02/07/23    Intra-Procedure Documentation    Narrative  PHILIPPE performed in the Invasive Lab  - See Procedure Log link below for nursing documentation  - See PHILIPPE order on Card Proc Tab for physician findings       Stress - No results found for this or any previous visit.       Lab Results   Component Value Date     07/31/2023    K 4.2 07/31/2023     07/31/2023    CO2 28 07/31/2023    BUN 20 (H) 07/31/2023    CREATININE 1.09 (H) 03/03/2024    CALCIUM 9.6 07/31/2023    ANIONGAP 10 07/31/2023    ESTGFRAFRICA 74 03/25/2021    EGFRNONAA 57 (L) 06/24/2022       Lab Results   Component Value Date    CHOL 214 (H) 07/31/2023    CHOL 163 02/10/2023    CHOL 204 (H) 09/28/2022     Lab Results   Component Value Date    HDL 51 07/31/2023    HDL 49 02/10/2023    HDL 58 09/28/2022     Lab Results   Component Value Date    LDLCALC 131 07/31/2023    LDLCALC 96 02/10/2023    LDLCALC 124 09/28/2022     Lab Results   Component Value Date    TRIG 161 (H) 07/31/2023    TRIG 91 02/10/2023    TRIG 109 09/28/2022     Lab Results   Component Value Date    CHOLHDL 4.2 07/31/2023    CHOLHDL 3.3 02/10/2023    CHOLHDL 3.5 09/28/2022       Lab Results   Component  Value Date    WBC 10.43 07/31/2023    HGB 13.3 07/31/2023    HCT 43.1 07/31/2023    MCV 91.7 07/31/2023     07/31/2023           Current Outpatient Medications:     albuterol (PROVENTIL/VENTOLIN HFA) 90 mcg/actuation inhaler, INHALE TWO (2) PUFFS INTO THE LUNGS EVERY FOUR (4) HOURS AS NEEDED FOR WHEEZING., Disp: 18 g, Rfl: 5    aspirin 81 MG Chew, Take 81 mg by mouth once daily., Disp: , Rfl:     budesonide-formoterol 160-4.5 mcg (SYMBICORT) 160-4.5 mcg/actuation HFAA, Inhale 2 puffs into the lungs twice daily, Disp: 10.2 g, Rfl: 5    ezetimibe (ZETIA) 10 mg tablet, Take 1 tablet (10 mg total) by mouth once daily., Disp: 90 tablet, Rfl: 3    furosemide (LASIX) 20 MG tablet, TAKE 1 TABLET BY MOUTH EACH MORNING FOR FLUID OR SWELLING, Disp: 90 tablet, Rfl: 1    losartan (COZAAR) 25 MG tablet, Take 1 tablet (25 mg total) by mouth once daily. (Patient taking differently: Take 50 mg by mouth once daily.), Disp: 90 tablet, Rfl: 3    metoprolol succinate (TOPROL-XL) 50 MG 24 hr tablet, Take 50 mg by mouth., Disp: , Rfl:     pantoprazole (PROTONIX) 40 MG tablet, TAKE ONE (1) TABLET (40 MG TOTAL) BY MOUTH ONCE DAILY., Disp: 90 tablet, Rfl: 1    rivaroxaban (XARELTO) 20 mg Tab, Take 20 mg by mouth., Disp: , Rfl:     amiodarone (PACERONE) 200 MG Tab, Take 200 mg by mouth once daily., Disp: , Rfl:     amiodarone (PACERONE) 200 MG Tab, Take 1 tablet (200 mg total) by mouth once daily. (Patient not taking: Reported on 6/17/2024), Disp: 90 tablet, Rfl: 1    digoxin (LANOXIN) 125 mcg tablet, Take 1 tablet (0.125 mg total) by mouth once daily. (Patient not taking: Reported on 5/22/2024), Disp: 90 tablet, Rfl: 1    fluticasone propionate (FLONASE) 50 mcg/actuation nasal spray, 2 sprays (100 mcg total) by Each Nostril route once daily. (Patient not taking: Reported on 5/22/2024), Disp: 16 g, Rfl: 5    losartan (COZAAR) 25 MG tablet, Take 1 tablet (25 mg total) by mouth once daily. (Patient not taking: Reported on 5/22/2024),  "Disp: 90 tablet, Rfl: 0    metoprolol succinate (TOPROL-XL) 50 MG 24 hr tablet, Take 1 tablet (50 mg total) by mouth once daily. (Patient not taking: Reported on 7/26/2024), Disp: 90 tablet, Rfl: 0    rivaroxaban (XARELTO) 20 mg Tab, Take 1 tablet (20 mg total) by mouth daily with dinner. (Patient not taking: Reported on 6/17/2024), Disp: 90 tablet, Rfl: 1    Review of Systems   Constitutional: Positive for chills. Negative for fever and weight gain.        Chills earlier this week - sick contact with grandchildren recently. Feels improved now.    HENT:  Negative for congestion, ear pain and sore throat.    Eyes:  Negative for blurred vision and pain.   Cardiovascular:  Positive for leg swelling. Negative for chest pain, dyspnea on exertion and palpitations.        Chronic lower extremity swelling improves with elevation of legs - unchanged.    Respiratory:  Positive for snoring. Negative for cough.         HENRY with CPAP nightly.    Endocrine: Negative for polydipsia and polyuria.   Skin:  Negative for dry skin, itching and rash.   Musculoskeletal:  Positive for back pain and joint pain.        Chronic back pain and joint pain - Unchanged.    Gastrointestinal:  Negative for abdominal pain, diarrhea, nausea and vomiting.   Genitourinary:  Negative for dysuria, frequency and hematuria.   Neurological:  Positive for headaches. Negative for dizziness, numbness and paresthesias.        Minor headache earlier this week after sick contact with children - resolved.    Psychiatric/Behavioral:  Negative for depression. The patient is not nervous/anxious.           Objective:   /80 (BP Location: Right arm, Patient Position: Sitting)   Pulse (!) 55   Ht 5' 6" (1.676 m)   Wt 131.1 kg (289 lb)   SpO2 99%   BMI 46.65 kg/m²       Physical Exam  Constitutional:       Appearance: Normal appearance. She is obese.   HENT:      Head: Normocephalic and atraumatic.      Nose: Nose normal.   Eyes:      Pupils: Pupils are equal, " round, and reactive to light.   Cardiovascular:      Rate and Rhythm: Normal rate and regular rhythm.      Pulses: Normal pulses.      Heart sounds: Murmur heard.   Pulmonary:      Effort: Pulmonary effort is normal.      Breath sounds: Normal breath sounds.   Abdominal:      Palpations: Abdomen is soft.   Musculoskeletal:         General: Normal range of motion.      Cervical back: Normal range of motion.   Skin:     General: Skin is warm and dry.   Neurological:      General: No focal deficit present.      Mental Status: She is alert and oriented to person, place, and time.   Psychiatric:         Mood and Affect: Mood normal.         Thought Content: Thought content normal.         Judgment: Judgment normal.         Assessment:     1. Primary hypertension      adequate control on current meds      2. PAC (premature atrial contraction)      symptoms have resolved.      3. Paroxysmal atrial fibrillation      Maintaining NSR post a fib ablation      4. Obstructive sleep apnea of adult      reports is compliant with CPAP            Plan:   Follow up in six months, sooner if symptoms change

## 2024-09-10 RX ORDER — LOSARTAN POTASSIUM 25 MG/1
25 TABLET ORAL DAILY
Qty: 90 TABLET | Refills: 1 | Status: SHIPPED | OUTPATIENT
Start: 2024-09-10 | End: 2025-03-09

## 2024-09-23 ENCOUNTER — PATIENT MESSAGE (OUTPATIENT)
Dept: SURGERY | Facility: CLINIC | Age: 64
End: 2024-09-23
Payer: COMMERCIAL

## 2024-09-23 DIAGNOSIS — E78.5 HYPERLIPIDEMIA, UNSPECIFIED HYPERLIPIDEMIA TYPE: ICD-10-CM

## 2024-09-23 RX ORDER — EZETIMIBE 10 MG/1
10 TABLET ORAL DAILY
Qty: 90 TABLET | Refills: 3 | OUTPATIENT
Start: 2024-09-23 | End: 2025-09-23

## 2024-10-07 DIAGNOSIS — E78.5 HYPERLIPIDEMIA, UNSPECIFIED HYPERLIPIDEMIA TYPE: ICD-10-CM

## 2024-10-07 RX ORDER — EZETIMIBE 10 MG/1
10 TABLET ORAL DAILY
Qty: 90 TABLET | Refills: 1 | Status: SHIPPED | OUTPATIENT
Start: 2024-10-07 | End: 2024-10-07 | Stop reason: SDUPTHER

## 2024-10-09 RX ORDER — METOPROLOL SUCCINATE 50 MG/1
50 TABLET, EXTENDED RELEASE ORAL DAILY
Qty: 90 TABLET | Refills: 1 | Status: SHIPPED | OUTPATIENT
Start: 2024-10-09

## 2024-10-09 RX ORDER — EZETIMIBE 10 MG/1
10 TABLET ORAL DAILY
Qty: 90 TABLET | Refills: 1 | Status: SHIPPED | OUTPATIENT
Start: 2024-10-09

## 2024-11-04 ENCOUNTER — OFFICE VISIT (OUTPATIENT)
Dept: PULMONOLOGY | Facility: CLINIC | Age: 64
End: 2024-11-04
Payer: COMMERCIAL

## 2024-11-04 ENCOUNTER — HOSPITAL ENCOUNTER (OUTPATIENT)
Dept: RADIOLOGY | Facility: HOSPITAL | Age: 64
Discharge: HOME | End: 2024-11-04
Attending: STUDENT IN AN ORGANIZED HEALTH CARE EDUCATION/TRAINING PROGRAM
Payer: COMMERCIAL

## 2024-11-04 VITALS
HEIGHT: 66 IN | HEART RATE: 68 BPM | WEIGHT: 282 LBS | SYSTOLIC BLOOD PRESSURE: 138 MMHG | BODY MASS INDEX: 45.32 KG/M2 | OXYGEN SATURATION: 98 % | DIASTOLIC BLOOD PRESSURE: 80 MMHG | RESPIRATION RATE: 18 BRPM

## 2024-11-04 DIAGNOSIS — G47.33 OBSTRUCTIVE SLEEP APNEA: ICD-10-CM

## 2024-11-04 DIAGNOSIS — R91.1 SOLITARY PULMONARY NODULE: Primary | ICD-10-CM

## 2024-11-04 DIAGNOSIS — R91.1 SOLITARY PULMONARY NODULE: ICD-10-CM

## 2024-11-04 DIAGNOSIS — J45.20 MILD INTERMITTENT ASTHMA WITHOUT COMPLICATION: ICD-10-CM

## 2024-11-04 DIAGNOSIS — E04.1 THYROID NODULE: ICD-10-CM

## 2024-11-04 LAB — CREAT SERPL-MCNC: 1.3 MG/DL (ref 0.6–1.3)

## 2024-11-04 PROCEDURE — 4010F ACE/ARB THERAPY RXD/TAKEN: CPT | Mod: ,,, | Performed by: STUDENT IN AN ORGANIZED HEALTH CARE EDUCATION/TRAINING PROGRAM

## 2024-11-04 PROCEDURE — 71270 CT THORAX DX C-/C+: CPT | Mod: TC

## 2024-11-04 PROCEDURE — 3075F SYST BP GE 130 - 139MM HG: CPT | Mod: ,,, | Performed by: STUDENT IN AN ORGANIZED HEALTH CARE EDUCATION/TRAINING PROGRAM

## 2024-11-04 PROCEDURE — 3079F DIAST BP 80-89 MM HG: CPT | Mod: ,,, | Performed by: STUDENT IN AN ORGANIZED HEALTH CARE EDUCATION/TRAINING PROGRAM

## 2024-11-04 PROCEDURE — 99214 OFFICE O/P EST MOD 30 MIN: CPT | Mod: S$PBB,,, | Performed by: STUDENT IN AN ORGANIZED HEALTH CARE EDUCATION/TRAINING PROGRAM

## 2024-11-04 PROCEDURE — 99215 OFFICE O/P EST HI 40 MIN: CPT | Mod: PBBFAC,25 | Performed by: STUDENT IN AN ORGANIZED HEALTH CARE EDUCATION/TRAINING PROGRAM

## 2024-11-04 PROCEDURE — 99999 PR PBB SHADOW E&M-EST. PATIENT-LVL V: CPT | Mod: PBBFAC,,, | Performed by: STUDENT IN AN ORGANIZED HEALTH CARE EDUCATION/TRAINING PROGRAM

## 2024-11-04 PROCEDURE — 1159F MED LIST DOCD IN RCRD: CPT | Mod: ,,, | Performed by: STUDENT IN AN ORGANIZED HEALTH CARE EDUCATION/TRAINING PROGRAM

## 2024-11-04 PROCEDURE — 25500020 PHARM REV CODE 255: Performed by: STUDENT IN AN ORGANIZED HEALTH CARE EDUCATION/TRAINING PROGRAM

## 2024-11-04 PROCEDURE — 82565 ASSAY OF CREATININE: CPT

## 2024-11-04 PROCEDURE — 3008F BODY MASS INDEX DOCD: CPT | Mod: ,,, | Performed by: STUDENT IN AN ORGANIZED HEALTH CARE EDUCATION/TRAINING PROGRAM

## 2024-11-04 PROCEDURE — 71270 CT THORAX DX C-/C+: CPT | Mod: 26,,, | Performed by: RADIOLOGY

## 2024-11-04 RX ORDER — IOPAMIDOL 755 MG/ML
100 INJECTION, SOLUTION INTRAVASCULAR
Status: COMPLETED | OUTPATIENT
Start: 2024-11-04 | End: 2024-11-04

## 2024-11-04 RX ADMIN — IOPAMIDOL 100 ML: 755 INJECTION, SOLUTION INTRAVENOUS at 01:11

## 2024-11-04 NOTE — PROGRESS NOTES
Patient Name: Kat Nam   Primary Care Provider: Bony Mary MD   Date of Service: 11/04/2024   Reason for Referral:  Lung nodule    Chief Complaint: Pulmonary Nodules (Had CT done. Little SOB of breathe today. )      Subjective:      Kat Nam is 64 y.o. female with With past medical history significant for apparent asthma (no pulmonary function tests on file), chronic lower back pain, gastroesophageal reflux disease history of basal cell carcinoma of the skin, phlebitis, atrial fibrillation and sleep apnea who presents for evaluation of lung nodule seen    Follow up clinic visit 11/4/24  Per my review, no large mass, similar lung nodules, if not somewhat improved.  However, official radiology read still pending at this time.  She attempted using the Symbicort previously with no difference on her occasional dyspnea with exertion.  No episodes of wheezing noted.    Follow up clinic visit 5/22/24   PFTs with evidence of obstructive sleep apnea, borderline bronchodilator response.  Patient feels well, using Symbicort 3 times a week.        Initial clinic visit 4/23/24   Presents for evaluation of lung nodules as seen below.  She presents today stating that she had a chest x-ray performed initially (for screening as she was on amiodarone.  This led to the calcified granuloma being seen on the chest x-ray, leading to a CT chest with and without contrast.  It was their that the small nodules were seen.  She otherwise has no weight loss, hemoptysis, worsening shortness of breath etc..  She has a history of childhood asthma (severe asthma to the age of 12, improved and then had another severe period of asthma in her 40s).  At this time she has on a controller Symbicort inhaler and albuterol as a rescue inhaler.  Her symptoms have improved significantly over the last dear and she has not had any events of continued ongoing wheezing, exacerbations recently.  She is compliant with her CPAP and has a  history of obstructive sleep apnea.      Personal Diagnostics Review  I personally reviewed and interpreted the following   Images from 2/16/24 (CT chest) were reviewed by me personally which showed evidence of 3-4 mm nodular densities (not very obvious on CT images -one is present in the posterior right upper lobe).  By my estimation, she had enlarged station 4R (12.6 mm without any other significant mediastinal and hilar adenopathy.  I also noted a granulomatous calcification in the left lower lobe measuring about 8.8 mm She also had heterogeneous lesion in the left thyroid lobe.  This is followed up by an thyroid ultrasound and she has been recommended to get an FNA for this as there was a solid nodule in the left thyroid lobe.        Assessment and Plan      Lung nodules, 3-4 mm  Stable      Assessment:  She is a lifelong nonsmoker.  The risk calculator ([facility]-3-13% risk), susie calculator is less than 1% risk.  The left lower lobe calcified area has a benign appearance (patient tells me that she has had this and she was 16 years old).    CT chest from 11/4/24   Impression:     Multiple small lung nodules, unchanged compared to the 03/08/2024 study and likely unchanged compared to the 2020 examination, allowing for differences in technique between the examinations.        Plan  Low risk lung nodules, repeat CT scan in 1 year's time        Asthma, well-controlled  Obstructive sleep apnea, on CPAP      Assessment:  History of asthma, childhood asthma with seasonal allergies.  Very well controlled over the last couple of years.  Last pulmonary function tests more than 10 years ago.  I moved her from scheduled Symbicort to p.r.n. Symbicort last visit, and since then she has not needed her Symbicort.    Plan  Can continue use Symbicort p.r.n., has not required recently    Thyroid nodule      Assessment:  Nondiagnostic thyroid biopsy FNA.  Pending follow-up/core biopsy by surgery service.    Plan  We will  assist patient in scheduling surgery follow up.          Follow-up   Follow-up in 1 year's time or sooner p.r.n.       Cooper Sun MD  Interventional Pulmonary and Critical Care  [facility]      Problem List Items Addressed This Visit    None          Past Medical History:   Diagnosis Date    Asthma     Bilateral leg pain     Chronic low back pain     Compression of vein     Iliac vein compression syndrome    Digestive disorder     Edema of lower extremity     Gastroesophageal reflux disease     History of basal cell carcinoma (BCC) of skin 2022    Hx of phlebitis     Hx of thrombophlebitis     Hyperlipidemia     Hypertension     Lymphedema, not elsewhere classified     Morbid obesity     Other skin changes     Peripheral edema     Peripheral vascular disease, unspecified     Postartificial menopausal syndrome     Premature atrial contraction     Sleep apnea       Past Surgical History:   Procedure Laterality Date    ARTHROSCOPY OF KNEE Left 2021    Procedure: ARTHROSCOPY, KNEE;  Surgeon: Noman Huerta MD;  Location: [facility];  Service: Orthopedics;  Laterality: Left;    BASAL CELL CARCINOMA EXCISION  2022    left eyebrow (Mohs w/ Dr. Garcia)     SECTION      CHOLECYSTECTOMY      ECHOCARDIOGRAM,TRANSESOPHAGEAL N/A 2023    Procedure: Transesophageal echo (PHILIPPE) intra-procedure log documentation;  Surgeon: Sonu Smith MD;  Location: [facility] CATH LAB;  Service: Cardiology;  Laterality: N/A;    EXCISION OF MEDIAL MENISCUS OF KNEE Left 2021    Procedure: MENISCECTOMY, KNEE, MEDIAL;  Surgeon: Noman Huerta MD;  Location: [facility];  Service: Orthopedics;  Laterality: Left;    HYSTERECTOMY      KNEE ARTHROSCOPY W/ MENISCECTOMY Left 2021    Procedure: ARTHROSCOPY, KNEE, WITH MENISCECTOMY;  Surgeon: Noman Huerta MD;  Location: [facility];  Service: Orthopedics;  Laterality: Left;  LATERAL MENISECTOMY    SPINE SURGERY       Lumbar Laminectomy, L4-L5    TREATMENT OF CARDIAC ARRHYTHMIA N/A 2/8/2023    Procedure: Cardioversion or Defibrillation;  Surgeon: Sonu Smith MD;  Location: [facility] CATH LAB;  Service: Cardiology;  Laterality: N/A;     Family History   Problem Relation Name Age of Onset    Diabetes Mellitus Mother      Heart disease Father      Hypertension Father      Diabetes Mellitus Sister      Diabetes Mellitus Brother      Heart disease Brother       Review of patient's allergies indicates:   Allergen Reactions    Poppy Swelling    Aldomet amanda hcl injection      Headache    Methyldopa      Other reaction(s): Unknown      Social History     Tobacco Use    Smoking status: Never     Passive exposure: Past    Smokeless tobacco: Never   Substance Use Topics    Alcohol use: Never    Drug use: Never      Review of Systems       Objective:      Physical Exam  Constitutional:       General: She is not in acute distress.     Appearance: Normal appearance. She is obese. She is not ill-appearing or diaphoretic.   HENT:      Head: Normocephalic and atraumatic.      Nose: No congestion or rhinorrhea.      Mouth/Throat:      Mouth: Mucous membranes are moist.   Cardiovascular:      Rate and Rhythm: Normal rate and regular rhythm.      Heart sounds: Normal heart sounds.   Pulmonary:      Effort: Pulmonary effort is normal. No respiratory distress.      Breath sounds: Normal breath sounds. No stridor. No wheezing, rhonchi or rales.   Chest:      Chest wall: No tenderness.   Abdominal:      General: Abdomen is flat.   Musculoskeletal:      Cervical back: Normal range of motion. No rigidity.      Right lower leg: No edema.      Left lower leg: No edema.   Skin:     General: Skin is warm.      Findings: No erythema.   Neurological:      General: No focal deficit present.      Mental Status: She is alert and oriented to person, place, and time. Mental status is at baseline.   Psychiatric:         Mood and Affect: Mood normal.          "Behavior: Behavior normal.         Thought Content: Thought content normal.                11/4/2024     1:29 PM 7/26/2024    12:02 PM 6/17/2024     9:22 AM 5/22/2024     9:34 AM 5/22/2024     8:30 AM 5/22/2024     8:00 AM 5/14/2024     8:21 AM   Pulmonary Function Tests   FVC     230 liters     FVC%     70     FEV1     1.84 liters     FEV1%     72     FEF 25-75     1.84     FEF 25-75%     83     TLC (liters)     3.54 liters     TLC%     66     RV     1.2     RV%     55     DLCO (ml/mmHg sec)     19.65 ml/mmHg sec     DLCO%     92     Peak Flow     354 L/min     FiO2 (%)     21 %     SpO2 98 % 99 % 99 % 98 % 98 %  97 %   Ordering Provider      NATHALIA DORSEY MD    Performing nurse/tech/RT      JOSH MCCOY CRT    Diagnosis      Shortness of Breath    Height 5' 6" (1.676 m) 5' 6" (1.676 m) 5' 6" (1.676 m) 5' 6" (1.676 m)  5' 6" (1.676 m)    Weight 127.9 kg (282 lb) 131.1 kg (289 lb) 132.9 kg (293 lb) 128.4 kg (283 lb)  128.4 kg (283 lb)    BMI (Calculated) 45.5 46.7 47.3 45.7  45.7    Patient Race          6MWT Status      completed without stopping    Patient Reported      No complaints    Was O2 used?      No    6MW Distance walked (feet)      1165 feet    Distance walked (meters)      355.09 meters    Did patient stop?      No    Type of assistive device(s) used?      no assistive devices    Oxygen Saturation      99 %    Supplemental Oxygen      Room Air    Heart Rate      55 bpm    Blood Pressure      147/86    Manan Dyspnea Rating       heavy    Oxygen Saturation      96 %    Supplemental Oxygen      Room Air    Heart Rate      92 bpm    Blood Pressure      187/95    Manan Dyspnea Rating       very heavy    Recovery Time (seconds)      60 seconds    Oxygen Saturation      98 %    Supplemental Oxygen      Room Air    Heart Rate      62 bpm    Blood Pressure      159/89    Manan Dyspnea Rating       somewhat heavy          Outpatient Encounter Medications as of 11/4/2024   Medication Sig Dispense Refill    " albuterol (PROVENTIL/VENTOLIN HFA) 90 mcg/actuation inhaler INHALE TWO (2) PUFFS INTO THE LUNGS EVERY FOUR (4) HOURS AS NEEDED FOR WHEEZING. 18 g 5    aspirin 81 MG Chew Take 81 mg by mouth once daily.      budesonide-formoterol 160-4.5 mcg (SYMBICORT) 160-4.5 mcg/actuation HFAA Inhale 2 puffs into the lungs twice daily 10.2 g 5    ezetimibe (ZETIA) 10 mg tablet Take 1 tablet (10 mg total) by mouth once daily. 90 tablet 1    fluticasone propionate (FLONASE) 50 mcg/actuation nasal spray 2 sprays (100 mcg total) by Each Nostril route once daily. 16 g 5    furosemide (LASIX) 20 MG tablet TAKE 1 TABLET BY MOUTH EACH MORNING FOR FLUID OR SWELLING 90 tablet 1    losartan (COZAAR) 25 MG tablet Take 1 tablet (25 mg total) by mouth once daily. 90 tablet 1    metoprolol succinate (TOPROL-XL) 50 MG 24 hr tablet Take 1 tablet (50 mg total) by mouth once daily. 90 tablet 1    pantoprazole (PROTONIX) 40 MG tablet TAKE ONE (1) TABLET (40 MG TOTAL) BY MOUTH ONCE DAILY. 90 tablet 1    amiodarone (PACERONE) 200 MG Tab Take 200 mg by mouth once daily. (Patient not taking: Reported on 11/4/2024)      amiodarone (PACERONE) 200 MG Tab Take 1 tablet (200 mg total) by mouth once daily. (Patient not taking: Reported on 11/4/2024) 90 tablet 1    digoxin (LANOXIN) 125 mcg tablet Take 1 tablet (0.125 mg total) by mouth once daily. (Patient not taking: Reported on 11/4/2024) 90 tablet 1    rivaroxaban (XARELTO) 20 mg Tab Take 1 tablet (20 mg total) by mouth daily with dinner. (Patient not taking: Reported on 11/4/2024) 90 tablet 1     Facility-Administered Encounter Medications as of 11/4/2024   Medication Dose Route Frequency Provider Last Rate Last Admin    [COMPLETED] iopamidoL (ISOVUE-370) injection 100 mL  100 mL Intravenous ONCE PRN Cooper Sun MD   100 mL at 11/04/24 1315       Assessment & Plan    As above                                          No orders of the defined types were placed in this encounter.

## 2024-11-12 ENCOUNTER — OFFICE VISIT (OUTPATIENT)
Dept: SURGERY | Facility: CLINIC | Age: 64
End: 2024-11-12
Payer: COMMERCIAL

## 2024-11-12 DIAGNOSIS — E04.1 THYROID NODULE: Primary | ICD-10-CM

## 2024-11-12 PROCEDURE — 1159F MED LIST DOCD IN RCRD: CPT | Mod: ,,, | Performed by: STUDENT IN AN ORGANIZED HEALTH CARE EDUCATION/TRAINING PROGRAM

## 2024-11-12 PROCEDURE — 99213 OFFICE O/P EST LOW 20 MIN: CPT | Mod: S$PBB,,, | Performed by: STUDENT IN AN ORGANIZED HEALTH CARE EDUCATION/TRAINING PROGRAM

## 2024-11-12 PROCEDURE — 4010F ACE/ARB THERAPY RXD/TAKEN: CPT | Mod: ,,, | Performed by: STUDENT IN AN ORGANIZED HEALTH CARE EDUCATION/TRAINING PROGRAM

## 2024-11-12 PROCEDURE — 99999 PR PBB SHADOW E&M-EST. PATIENT-LVL III: CPT | Mod: PBBFAC,,, | Performed by: STUDENT IN AN ORGANIZED HEALTH CARE EDUCATION/TRAINING PROGRAM

## 2024-11-12 PROCEDURE — 99213 OFFICE O/P EST LOW 20 MIN: CPT | Mod: PBBFAC | Performed by: STUDENT IN AN ORGANIZED HEALTH CARE EDUCATION/TRAINING PROGRAM

## 2024-11-13 NOTE — PROGRESS NOTES
History & Physical    Subjective     History of Present Illness:  63-year-old  female presents the clinic for evaluation for thyroid nodule.  Patient was filling some fullness in her neck and her primary care provider ordered a thyroid ultrasound which showed a solid heterogeneous nodule with suggestion of punctate echogenic foci demonstrated within the left thyroid lobe measuring up to 4.3 cm; cystic 2.6 mm right thyroid nodule; free T3, free T4 and TSH all unremarkable.  Patient states she does have family history of some thyroid disease in her daughter, but no history of thyroid cancer.  Patient denies any hoarseness or any dysphagia.  Patient is on Xarelto for atrial fibrillation; underwent ablation in 2023 at [facility]; does follow with cardiology with Dr. Wyman.  Past surgical history of cholecystectomy, hysterectomy,  x3, back surgery.  Currently, Denies any chest pain/shortness of breath, nausea/vomiting/diarrhea, fever/chills.    :  Patient presents for re-evaluation.  Underwent IR guided biopsy of thyroid nodules on May 14th; determine nondiagnostic.  Discussed with the patient was she went to follow up in 6 months.  No changes regarding any problems swallowing or dysphagia or hoarseness.  Denies any chest pain/shortness of breath, nausea/vomiting/diarrhea, fever/chills.    No chief complaint on file.      Review of patient's allergies indicates:   Allergen Reactions    Poppy Swelling    Aldomet amanda hcl injection      Headache    Methyldopa      Other reaction(s): Unknown       Current Outpatient Medications   Medication Sig Dispense Refill    albuterol (PROVENTIL/VENTOLIN HFA) 90 mcg/actuation inhaler INHALE TWO (2) PUFFS INTO THE LUNGS EVERY FOUR (4) HOURS AS NEEDED FOR WHEEZING. 18 g 5    budesonide-formoterol 160-4.5 mcg (SYMBICORT) 160-4.5 mcg/actuation HFAA Inhale 2 puffs into the lungs twice daily 10.2 g 5    ezetimibe (ZETIA) 10 mg tablet Take 1 tablet (10 mg  total) by mouth once daily. 90 tablet 1    fluticasone propionate (FLONASE) 50 mcg/actuation nasal spray 2 sprays (100 mcg total) by Each Nostril route once daily. 16 g 5    furosemide (LASIX) 20 MG tablet TAKE 1 TABLET BY MOUTH EACH MORNING FOR FLUID OR SWELLING 90 tablet 1    losartan (COZAAR) 25 MG tablet Take 1 tablet (25 mg total) by mouth once daily. 90 tablet 1    metoprolol succinate (TOPROL-XL) 50 MG 24 hr tablet Take 1 tablet (50 mg total) by mouth once daily. 90 tablet 1    pantoprazole (PROTONIX) 40 MG tablet TAKE ONE (1) TABLET (40 MG TOTAL) BY MOUTH ONCE DAILY. 90 tablet 1    amiodarone (PACERONE) 200 MG Tab Take 200 mg by mouth once daily. (Patient not taking: Reported on 11/12/2024)      amiodarone (PACERONE) 200 MG Tab Take 1 tablet (200 mg total) by mouth once daily. (Patient not taking: Reported on 6/17/2024) 90 tablet 1    aspirin 81 MG Chew Take 81 mg by mouth once daily.      digoxin (LANOXIN) 125 mcg tablet Take 1 tablet (0.125 mg total) by mouth once daily. (Patient not taking: Reported on 11/4/2024) 90 tablet 1    rivaroxaban (XARELTO) 20 mg Tab Take 1 tablet (20 mg total) by mouth daily with dinner. (Patient not taking: Reported on 11/12/2024) 90 tablet 1     No current facility-administered medications for this visit.       Past Medical History:   Diagnosis Date    Asthma     Bilateral leg pain     Chronic low back pain     Compression of vein     Iliac vein compression syndrome    Digestive disorder     Edema of lower extremity     Gastroesophageal reflux disease     History of basal cell carcinoma (BCC) of skin 02/28/2022    Hx of phlebitis     Hx of thrombophlebitis     Hyperlipidemia     Hypertension     Lymphedema, not elsewhere classified     Morbid obesity     Other skin changes     Peripheral edema     Peripheral vascular disease, unspecified     Postartificial menopausal syndrome     Premature atrial contraction     Sleep apnea      Past Surgical History:   Procedure Laterality  Date    ARTHROSCOPY OF KNEE Left 2021    Procedure: ARTHROSCOPY, KNEE;  Surgeon: Noman Huerta MD;  Location: [facility] OR;  Service: Orthopedics;  Laterality: Left;    BASAL CELL CARCINOMA EXCISION  2022    left eyebrow (Mohs w/ Dr. Garcia)     SECTION      CHOLECYSTECTOMY      ECHOCARDIOGRAM,TRANSESOPHAGEAL N/A 2023    Procedure: Transesophageal echo (PHILIPPE) intra-procedure log documentation;  Surgeon: Sonu Smith MD;  Location: [facility] CATH LAB;  Service: Cardiology;  Laterality: N/A;    EXCISION OF MEDIAL MENISCUS OF KNEE Left 2021    Procedure: MENISCECTOMY, KNEE, MEDIAL;  Surgeon: Noman Huerta MD;  Location: [facility] OR;  Service: Orthopedics;  Laterality: Left;    HYSTERECTOMY      KNEE ARTHROSCOPY W/ MENISCECTOMY Left 2021    Procedure: ARTHROSCOPY, KNEE, WITH MENISCECTOMY;  Surgeon: Noman Huerta MD;  Location: [facility] OR;  Service: Orthopedics;  Laterality: Left;  LATERAL MENISECTOMY    SPINE SURGERY      Lumbar Laminectomy, L4-L5    TREATMENT OF CARDIAC ARRHYTHMIA N/A 2023    Procedure: Cardioversion or Defibrillation;  Surgeon: Sonu Smith MD;  Location: [facility] CATH LAB;  Service: Cardiology;  Laterality: N/A;     Family History   Problem Relation Name Age of Onset    Diabetes Mellitus Mother      Heart disease Father      Hypertension Father      Diabetes Mellitus Sister      Diabetes Mellitus Brother      Heart disease Brother       Social History     Tobacco Use    Smoking status: Never     Passive exposure: Past    Smokeless tobacco: Never   Substance Use Topics    Alcohol use: Never    Drug use: Never        Review of Systems:  Review of Systems   Constitutional: Negative.  Negative for fatigue and unexpected weight change.   HENT: Negative.  Negative for trouble swallowing.    Eyes: Negative.    Respiratory: Negative.  Negative for chest tightness and shortness of breath.    Cardiovascular: Negative.  Negative  for chest pain.   Gastrointestinal: Negative.  Negative for abdominal pain, blood in stool and nausea.   Endocrine: Negative.    Genitourinary: Negative.  Negative for hematuria.   Musculoskeletal: Negative.  Negative for back pain and myalgias.   Neurological: Negative.  Negative for dizziness, speech difficulty, weakness and light-headedness.   Psychiatric/Behavioral: Negative.  Negative for agitation and behavioral problems.           Objective     Vital Signs (Most Recent)              Physical Exam:  Physical Exam  Constitutional:       General: She is not in acute distress.     Appearance: Normal appearance.   HENT:      Head: Normocephalic.   Neck:      Thyroid: Thyroid tenderness present.        Comments: Tenderness to palpation on the right side of the neck; could palpate increased nodularity on the left thyroid lobe  Cardiovascular:      Rate and Rhythm: Normal rate.   Pulmonary:      Effort: Pulmonary effort is normal. No respiratory distress.   Abdominal:      General: There is no distension.      Tenderness: There is no abdominal tenderness.   Musculoskeletal:         General: Normal range of motion.   Skin:     General: Skin is warm.      Coloration: Skin is not jaundiced.   Neurological:      General: No focal deficit present.      Mental Status: She is alert and oriented to person, place, and time.      Cranial Nerves: No cranial nerve deficit.            Assessment and Plan   Thyroid nodule    PLAN:    We will repeat a thyroid ultrasound and thyroid panel and evaluate for change.  After discussing, patient okay to have a repeat biopsy with core needle biopsy of the left thyroid nodule; we did offer possible molecular testing, but patient would not like to do any molecular testing; if area comes back nondiagnostic or inconclusive, she would like to go ahead and proceed with a formal biopsy via thyroid lobectomy if needed.  We will contact the patient once we have the results

## 2024-11-25 ENCOUNTER — OFFICE VISIT (OUTPATIENT)
Dept: FAMILY MEDICINE | Facility: CLINIC | Age: 64
End: 2024-11-25
Payer: COMMERCIAL

## 2024-11-25 VITALS
TEMPERATURE: 98 F | HEART RATE: 68 BPM | DIASTOLIC BLOOD PRESSURE: 76 MMHG | RESPIRATION RATE: 18 BRPM | BODY MASS INDEX: 45.84 KG/M2 | SYSTOLIC BLOOD PRESSURE: 136 MMHG | WEIGHT: 285.25 LBS | OXYGEN SATURATION: 98 % | HEIGHT: 66 IN

## 2024-11-25 DIAGNOSIS — I10 HYPERTENSION, UNSPECIFIED TYPE: ICD-10-CM

## 2024-11-25 DIAGNOSIS — Z13.1 SCREENING FOR DIABETES MELLITUS: ICD-10-CM

## 2024-11-25 DIAGNOSIS — Z00.00 ROUTINE GENERAL MEDICAL EXAMINATION AT A HEALTH CARE FACILITY: Primary | ICD-10-CM

## 2024-11-25 DIAGNOSIS — Z23 NEED FOR IMMUNIZATION AGAINST INFLUENZA: ICD-10-CM

## 2024-11-25 DIAGNOSIS — Z13.220 SCREENING FOR LIPOID DISORDERS: ICD-10-CM

## 2024-11-25 PROCEDURE — 99396 PREV VISIT EST AGE 40-64: CPT | Mod: 25,,, | Performed by: FAMILY MEDICINE

## 2024-11-25 PROCEDURE — 3078F DIAST BP <80 MM HG: CPT | Mod: ,,, | Performed by: FAMILY MEDICINE

## 2024-11-25 PROCEDURE — 1160F RVW MEDS BY RX/DR IN RCRD: CPT | Mod: ,,, | Performed by: FAMILY MEDICINE

## 2024-11-25 PROCEDURE — 4010F ACE/ARB THERAPY RXD/TAKEN: CPT | Mod: ,,, | Performed by: FAMILY MEDICINE

## 2024-11-25 PROCEDURE — 90661 CCIIV3 VAC ABX FR 0.5 ML IM: CPT | Mod: ,,, | Performed by: FAMILY MEDICINE

## 2024-11-25 PROCEDURE — 3075F SYST BP GE 130 - 139MM HG: CPT | Mod: ,,, | Performed by: FAMILY MEDICINE

## 2024-11-25 PROCEDURE — 90471 IMMUNIZATION ADMIN: CPT | Mod: ,,, | Performed by: FAMILY MEDICINE

## 2024-11-25 PROCEDURE — 1159F MED LIST DOCD IN RCRD: CPT | Mod: ,,, | Performed by: FAMILY MEDICINE

## 2024-11-25 PROCEDURE — 3008F BODY MASS INDEX DOCD: CPT | Mod: ,,, | Performed by: FAMILY MEDICINE

## 2024-11-25 NOTE — PROGRESS NOTES
Kat Nam is a 64 y.o. female seen today for   her healthy med.  She denies chest pain or shortness for breath and overall is doing well and meeting her ADLs.  She is due for her flu vaccine which she will receive today but defers the mammogram for now.  She is up-to-date on her colon cancer screening via [facility].  Today she is not fasting but will stop by next week for her healthy you lab work and a CBC and CMP.     Past Medical History:   Diagnosis Date    Asthma     Bilateral leg pain     Chronic low back pain     Compression of vein     Iliac vein compression syndrome    Digestive disorder     Edema of lower extremity     Gastroesophageal reflux disease     History of basal cell carcinoma (BCC) of skin 02/28/2022    Hx of phlebitis     Hx of thrombophlebitis     Hyperlipidemia     Hypertension     Lymphedema, not elsewhere classified     Morbid obesity     Other skin changes     Peripheral edema     Peripheral vascular disease, unspecified     Postartificial menopausal syndrome     Premature atrial contraction     Sleep apnea      Family History   Problem Relation Name Age of Onset    Diabetes Mellitus Mother      Heart disease Father      Hypertension Father      Diabetes Mellitus Sister      Diabetes Mellitus Brother      Heart disease Brother       Current Outpatient Medications on File Prior to Visit   Medication Sig Dispense Refill    losartan (COZAAR) 25 MG tablet Take 1 tablet (25 mg total) by mouth once daily. (Patient taking differently: Take 50 mg by mouth once daily.) 90 tablet 1    albuterol (PROVENTIL/VENTOLIN HFA) 90 mcg/actuation inhaler INHALE TWO (2) PUFFS INTO THE LUNGS EVERY FOUR (4) HOURS AS NEEDED FOR WHEEZING. 18 g 5    amiodarone (PACERONE) 200 MG Tab Take 200 mg by mouth once daily. (Patient not taking: Reported on 11/12/2024)      amiodarone (PACERONE) 200 MG Tab Take 1 tablet (200 mg total) by mouth once daily. (Patient not taking: Reported on 6/17/2024) 90 tablet 1    aspirin  81 MG Chew Take 81 mg by mouth once daily.      budesonide-formoterol 160-4.5 mcg (SYMBICORT) 160-4.5 mcg/actuation HFAA Inhale 2 puffs into the lungs twice daily 10.2 g 5    digoxin (LANOXIN) 125 mcg tablet Take 1 tablet (0.125 mg total) by mouth once daily. (Patient not taking: Reported on 11/4/2024) 90 tablet 1    ezetimibe (ZETIA) 10 mg tablet Take 1 tablet (10 mg total) by mouth once daily. 90 tablet 1    fluticasone propionate (FLONASE) 50 mcg/actuation nasal spray 2 sprays (100 mcg total) by Each Nostril route once daily. 16 g 5    furosemide (LASIX) 20 MG tablet TAKE 1 TABLET BY MOUTH EACH MORNING FOR FLUID OR SWELLING 90 tablet 1    metoprolol succinate (TOPROL-XL) 50 MG 24 hr tablet Take 1 tablet (50 mg total) by mouth once daily. 90 tablet 1    pantoprazole (PROTONIX) 40 MG tablet TAKE ONE (1) TABLET (40 MG TOTAL) BY MOUTH ONCE DAILY. 90 tablet 1    rivaroxaban (XARELTO) 20 mg Tab Take 1 tablet (20 mg total) by mouth daily with dinner. (Patient not taking: Reported on 11/12/2024) 90 tablet 1     No current facility-administered medications on file prior to visit.     Immunization History   Administered Date(s) Administered    Influenza - Quadrivalent - PF *Preferred* (6 months and older) 11/04/2022, 11/21/2023    Influenza - Trivalent - Flucelvax - PF 11/25/2024       Review of Systems   Constitutional:  Negative for fever, malaise/fatigue and weight loss.   Respiratory:  Negative for shortness of breath.    Cardiovascular:  Negative for chest pain and palpitations.   Gastrointestinal:  Negative for nausea and vomiting.   Psychiatric/Behavioral:  Negative for depression.         Vitals:    11/25/24 1326   BP: 136/76   Pulse: 68   Resp: 18   Temp: 98.1 °F (36.7 °C)       Physical Exam     Assessment and Plan  1. Routine general medical examination at a health care facility    2. Screening for diabetes mellitus  -     Glucose, Fasting; Future; Expected date: 12/02/2024    3. Screening for lipoid  disorders  -     Lipid Panel; Future; Expected date: 12/02/2024    4. Need for immunization against influenza  -     influenza (Egg-FREE) (Flucelvax) 45 mcg/0.5 mL IM vaccine (> or = 6 mo) 0.5 mL    5. Hypertension, unspecified type  -     CBC Auto Differential; Future; Expected date: 12/02/2024  -     Comprehensive Metabolic Panel; Future; Expected date: 12/02/2024             Return to clinic in 6 months or as needed once her lab work is in.    Health Maintenance Topics with due status: Not Due       Topic Last Completion Date    Colorectal Cancer Screening 07/18/2022    Lipid Panel 07/31/2023

## 2024-11-26 ENCOUNTER — PATIENT OUTREACH (OUTPATIENT)
Facility: HOSPITAL | Age: 64
End: 2024-11-26
Payer: COMMERCIAL

## 2024-11-26 NOTE — PROGRESS NOTES
"Population Health Chart Review & Patient Outreach Details      Further Action Needed If Patient Returns Outreach:      Nadine Garcia, GALO  P Mary Edward Staff; Gm Trevizo LPN  1) Please create an addendum for wellness plan for HY 24  visit as soon as possible for accurate billing/flow to BC website.  If you look under the Rooming tab there is a section labeled "[employer] Healthy You/Color Me Healthy Goal Actions".  Under this section you will need to choose the visit type (HY or [facility]), at least one overall wellness goal, and at least three goal actions with at least one point of education per goal action category.  If you have any questions please let me know!  Thank you Nadine    2) Patient failed to have labs drawn at 24 HY visit.  Please contact patient to return prior to end of day 24 to avoid claim denial.   Nadine Whaley      Updates Requested / Reviewed:     []  Care Everywhere    []     []  External Sources ([facility], [facility], DIS, etc.)    [] [facility]   [] [facility]   [] Other:    []  Care Team Updated   []  Removed  or Duplicate Orders   []  Immunization Reconciliation Completed / Queried    [] [place]   [] [place]   [] [place]   [] [place]      Health Maintenance Topics Addressed and Outreach Outcomes / Actions Taken:             Breast Cancer Screening []  Mammogram Order Placed    []  Mammogram Screening Scheduled    []  External Records Requested & Care Team Updated if Applicable    []  External Records Uploaded & Care Team Updated if Applicable    []  Pt Declined Scheduling Mammogram    []  Pt Will Schedule with External Provider / Order Routed & Care Team Updated if Applicable              Cervical Cancer Screening []  Pap Smear Scheduled in Primary Care or OBGYN    []  External Records Requested & Care Team Updated if Applicable       []  External Records Uploaded, Care Team Updated, & History Updated if Applicable    []  Patient Declined Scheduling Pap Smear    " []  Patient Will Schedule with External Provider & Care Team Updated if Applicable                  Colorectal Cancer Screening []  Colonoscopy Case Request / Referral / Home Test Order Placed    []  External Records Requested & Care Team Updated if Applicable    []  External Records Uploaded, Care Team Updated, & History Updated if Applicable    []  Patient Declined Completing Colon Cancer Screening    []  Patient Will Schedule with External Provider & Care Team Updated if Applicable    []  Fit Kit Mailed (add the SmartPhrase under additional notes)    []  Reminded Patient to Complete Home Test                Diabetic Eye Exam []  Eye Exam Screening Order Placed    []  Eye Camera Scheduled or Optometry/Ophthalmology Referral Placed    []  External Records Requested & Care Team Updated if Applicable    []  External Records Uploaded, Care Team Updated, & History Updated if Applicable    []  Patient Declined Scheduling Eye Exam    []  Patient Will Schedule with External Provider & Care Team Updated if Applicable             Blood Pressure Control []  Primary Care Follow Up Visit Scheduled     []  Remote Blood Pressure Reading Captured    []  Patient Declined Remote Reading or Scheduling Appt - Escalated to PCP    []  Patient Will Call Back or Send Portal Message with Reading                 HbA1c & Other Labs []  Overdue Lab(s) Ordered    []  Overdue Lab(s) Scheduled    []  External Records Uploaded & Care Team Updated if Applicable    []  Primary Care Follow Up Visit Scheduled     []  Reminded Patient to Complete A1c Home Test    []  Patient Declined Scheduling Labs or Will Call Back to Schedule    []  Patient Will Schedule with External Provider / Order Routed, & Care Team Updated if Applicable           Primary Care Appointment []  Primary Care Appt Scheduled    []  Patient Declined Scheduling or Will Call Back to Schedule    []  Pt Established with External Provider, Updated Care Team, & Informed Pt to Notify  Payor if Applicable           Medication Adherence /    Statin Use []  Primary Care Appointment Scheduled    []  Patient Reminded to  Prescription    []  Patient Declined, Provider Notified if Needed    []  Sent Provider Message to Review to Evaluate Pt for Statin, Add Exclusion Dx Codes, Document   Exclusion in Problem List, Change Statin Intensity Level to Moderate or High Intensity if Applicable                Osteoporosis Screening []  Dexa Order Placed    []  Dexa Appointment Scheduled    []  External Records Requested & Care Team Updated    []  External Records Uploaded, Care Team Updated, & History Updated if Applicable    []  Patient Declined Scheduling Dexa or Will Call Back to Schedule    []  Patient Will Schedule with External Provider / Order Routed & Care Team Updated if Applicable       Additional Notes:.  Post visit Population Health review of encounter with date of service  11/25/24 with Usman.  Not All required HY components in encounter. Labs in as future message sent. Manually entered wellness plan.  Followup appt for: 11/17/25 HY

## 2024-12-03 DIAGNOSIS — K21.9 GASTROESOPHAGEAL REFLUX DISEASE, UNSPECIFIED WHETHER ESOPHAGITIS PRESENT: ICD-10-CM

## 2024-12-03 RX ORDER — FUROSEMIDE 20 MG/1
TABLET ORAL
Qty: 90 TABLET | Refills: 1 | Status: SHIPPED | OUTPATIENT
Start: 2024-12-03

## 2024-12-03 RX ORDER — PANTOPRAZOLE SODIUM 40 MG/1
TABLET, DELAYED RELEASE ORAL
Qty: 90 TABLET | Refills: 1 | Status: SHIPPED | OUTPATIENT
Start: 2024-12-03

## 2024-12-05 ENCOUNTER — PATIENT MESSAGE (OUTPATIENT)
Dept: FAMILY MEDICINE | Facility: CLINIC | Age: 64
End: 2024-12-05
Payer: COMMERCIAL

## 2024-12-18 ENCOUNTER — HOSPITAL ENCOUNTER (OUTPATIENT)
Dept: RADIOLOGY | Facility: HOSPITAL | Age: 64
Discharge: HOME | End: 2024-12-18
Attending: STUDENT IN AN ORGANIZED HEALTH CARE EDUCATION/TRAINING PROGRAM
Payer: COMMERCIAL

## 2024-12-18 VITALS
RESPIRATION RATE: 16 BRPM | SYSTOLIC BLOOD PRESSURE: 169 MMHG | DIASTOLIC BLOOD PRESSURE: 74 MMHG | OXYGEN SATURATION: 98 % | HEART RATE: 68 BPM

## 2024-12-18 DIAGNOSIS — E04.1 THYROID NODULE: ICD-10-CM

## 2024-12-18 PROCEDURE — 60100 BIOPSY OF THYROID: CPT

## 2024-12-18 PROCEDURE — 88305 TISSUE EXAM BY PATHOLOGIST: CPT | Mod: TC,SUR | Performed by: RADIOLOGY

## 2024-12-18 NOTE — PROGRESS NOTES
IR requested to perform U/S guided thyroid FNA. H and P reviewed. Informed consent obtained.  
not examined

## 2024-12-18 NOTE — PROCEDURES
Procedure performed by Dr. Stein and assisted by Curtis DOMINGUEZ.  Patient presents for U/S guided core biopsy left thyroid nodule. Informed consent obtained. Patient prepped with chloraprep and draped in a sterile manor. 4 cc of 1% lidocaine infused. Utilizing U/S guidance 3 - 18 gauge core biopsies were acquired and sent to the lab for analysis. Pressure held on puncture site and bandage applied. Patient tolerated procedure well with no immediate complications.

## 2024-12-19 LAB
DHEA SERPL-MCNC: NORMAL
ESTROGEN SERPL-MCNC: NORMAL PG/ML
INSULIN SERPL-ACNC: NORMAL U[IU]/ML
LAB AP CLINICAL INFORMATION: NORMAL
LAB AP GROSS DESCRIPTION: NORMAL
LAB AP LABORATORY NOTES: NORMAL
T3RU NFR SERPL: NORMAL %

## 2024-12-23 ENCOUNTER — TELEPHONE (OUTPATIENT)
Dept: SURGERY | Facility: CLINIC | Age: 64
End: 2024-12-23
Payer: COMMERCIAL

## 2024-12-23 DIAGNOSIS — E04.1 THYROID NODULE: Primary | ICD-10-CM

## 2024-12-23 NOTE — TELEPHONE ENCOUNTER
----- Message from Shabbir Richardson DO sent at 12/20/2024  1:37 PM CST -----  Notify patient that the biopsy results came back benign.  Recommend repeat ultrasound in 6 months; order that repeat ultrasound  ----- Message -----  From: Lab, Background User  Sent: 12/19/2024   8:59 AM CST  To: Shabbir Richardson DO

## 2024-12-23 NOTE — TELEPHONE ENCOUNTER
Informed pt to see Dr. Richardson in clinic wit u/s in 6 months. Pt denies further questions at this time.

## 2025-02-25 DIAGNOSIS — J45.909 ASTHMA, UNSPECIFIED ASTHMA SEVERITY, UNSPECIFIED WHETHER COMPLICATED, UNSPECIFIED WHETHER PERSISTENT: ICD-10-CM

## 2025-02-25 DIAGNOSIS — E78.5 HYPERLIPIDEMIA, UNSPECIFIED HYPERLIPIDEMIA TYPE: ICD-10-CM

## 2025-02-25 RX ORDER — METOPROLOL SUCCINATE 50 MG/1
50 TABLET, EXTENDED RELEASE ORAL DAILY
Qty: 30 TABLET | Refills: 0 | Status: SHIPPED | OUTPATIENT
Start: 2025-02-25

## 2025-02-25 RX ORDER — BUDESONIDE AND FORMOTEROL FUMARATE DIHYDRATE 160; 4.5 UG/1; UG/1
AEROSOL RESPIRATORY (INHALATION)
Qty: 10.2 G | Refills: 5 | Status: SHIPPED | OUTPATIENT
Start: 2025-02-25

## 2025-02-25 RX ORDER — LOSARTAN POTASSIUM 25 MG/1
25 TABLET ORAL DAILY
Qty: 60 TABLET | Refills: 0 | Status: CANCELLED | OUTPATIENT
Start: 2025-02-25

## 2025-02-25 RX ORDER — EZETIMIBE 10 MG/1
10 TABLET ORAL DAILY
Qty: 30 TABLET | Refills: 0 | Status: SHIPPED | OUTPATIENT
Start: 2025-02-25

## 2025-02-25 RX ORDER — LOSARTAN POTASSIUM 50 MG/1
50 TABLET ORAL DAILY
Qty: 30 TABLET | Refills: 0 | Status: SHIPPED | OUTPATIENT
Start: 2025-02-25

## 2025-02-25 NOTE — TELEPHONE ENCOUNTER
Spoke with the patient on today to inform her that I will give her enough pills to last her until her appt on 3/24. Explained to the patient that if she would like any additional refills that she would have to come to this appt. Patient verbalized understanding.

## 2025-03-03 ENCOUNTER — OFFICE VISIT (OUTPATIENT)
Dept: DERMATOLOGY | Facility: CLINIC | Age: 65
End: 2025-03-03
Payer: COMMERCIAL

## 2025-03-03 VITALS — RESPIRATION RATE: 18 BRPM | WEIGHT: 285.25 LBS | HEIGHT: 66 IN | BODY MASS INDEX: 45.84 KG/M2

## 2025-03-03 DIAGNOSIS — L57.8 OTHER SKIN CHANGES DUE TO CHRONIC EXPOSURE TO NONIONIZING RADIATION: Primary | ICD-10-CM

## 2025-03-03 DIAGNOSIS — L82.0 SEBORRHEIC KERATOSES, INFLAMED: ICD-10-CM

## 2025-03-03 DIAGNOSIS — L72.0 EPIDERMAL CYST: ICD-10-CM

## 2025-03-03 DIAGNOSIS — L82.1 SEBORRHEIC KERATOSES: ICD-10-CM

## 2025-03-03 NOTE — PROGRESS NOTES
[facility]   Leila Thornton MD    Patient Name: Kat Nam  Patient YOB: 1960   Date of Service: 3/3/25    CC: Lesion    HPI: Kat Nam is a 64 y.o. female here today for lesion, located on the right hand.  Lesion has been present for 2 months.  Previous treatments include no treatment.  Patient is also concerned today about lesion located on the neck.    Past Medical History:   Diagnosis Date    Asthma     Bilateral leg pain     Chronic low back pain     Compression of vein     Iliac vein compression syndrome    Digestive disorder     Edema of lower extremity     Gastroesophageal reflux disease     History of basal cell carcinoma (BCC) of skin 2022    Hx of phlebitis     Hx of thrombophlebitis     Hyperlipidemia     Hypertension     Lymphedema, not elsewhere classified     Morbid obesity     Other skin changes     Peripheral edema     Peripheral vascular disease, unspecified     Postartificial menopausal syndrome     Premature atrial contraction     Sleep apnea      Past Surgical History:   Procedure Laterality Date    ARTHROSCOPY OF KNEE Left 2021    Procedure: ARTHROSCOPY, KNEE;  Surgeon: oNman Huerta MD;  Location: [facility];  Service: Orthopedics;  Laterality: Left;    BASAL CELL CARCINOMA EXCISION  2022    left eyebrow [place] [place]     SECTION      CHOLECYSTECTOMY      ECHOCARDIOGRAM,TRANSESOPHAGEAL N/A 2023    Procedure: Transesophageal echo (PHILIPPE) intra-procedure log documentation;  Surgeon: Sonu Smith MD;  Location: [facility] CATH LAB;  Service: Cardiology;  Laterality: N/A;    EXCISION OF MEDIAL MENISCUS OF KNEE Left 2021    Procedure: MENISCECTOMY, KNEE, MEDIAL;  Surgeon: Noman Huerta MD;  Location: [facility];  Service: Orthopedics;  Laterality: Left;    HYSTERECTOMY      KNEE ARTHROSCOPY W/ MENISCECTOMY Left 2021    Procedure: ARTHROSCOPY, KNEE, WITH MENISCECTOMY;  Surgeon: Noman Huerta MD;   Location: [facility]TH[facility];  Service: Orthopedics;  Laterality: Left;  LATERAL MENISECTOMY    SPINE SURGERY  1998    Lumbar Laminectomy, L4-L5    TREATMENT OF CARDIAC ARRHYTHMIA N/A 2/8/2023    Procedure: Cardioversion or Defibrillation;  Surgeon: Sonu Smith MD;  Location: [facility] CATH LAB;  Service: Cardiology;  Laterality: N/A;     Review of patient's allergies indicates:   Allergen Reactions    Poppy Swelling    Aldomet amanda hcl injection      Headache    Methyldopa      Other reaction(s): Unknown     Current Medications[1]    ROS: A focused review of systems was obtained and negative.     Exam: A focused skin exam was performed. All areas examined were normal except as mentioned in the assessment and plan below.  General Appearance of the patient is well developed and well nourished.  Orientation: alert and oriented x 3.  Mood and affect: pleasant.    Assessment:   The primary encounter diagnosis was Other skin changes due to chronic exposure to nonionizing radiation. Diagnoses of Seborrheic keratoses, Seborrheic keratoses, inflamed, and Epidermal cyst were also pertinent to this visit.    Plan:      Seborrheic Keratosis (L82.1)  - Stuck-on, warty, greasy brown papule with pseudo-horn cysts scattered on the trunk and extremities    Plan: Counseling.  I counseled the patient regarding the following:  Skin Care: Seborrheic Keratoses are benign. No treatment is necessary.  Expectations: Seborrheic Keratoses are benign warty growths. Patients get more of them as they age    Plan: Reassurance    Irritated Seborrheic Keratoses (L82.0)  Stuck-on inflamed papules with crust located on the bilateral hands  Associated diagnoses: Pruritus and Cutaneous Inflammation    Plan: Liquid Nitrogen.  A total of 5 lesions were treated with liquid nitrogen, located on the above listed location.  This procedure was medically necessary because the lesions that were treated were: irritated and itchy. The  patient's  consent was obtained including but not limited to risks of crusting, scabbing, blistering, scarring, darker  or lighter pigmentary change, recurrence, incomplete removal and infection.    Epidermal Cyst  - subcutaneous cyst with prominent follicular pore located on the right neck and back    Plan: Counseling  I counseled the patient regarding the following:  Skin Care: Epidermal Cysts require no specific skin care.  Expectations: Epidermal Cysts are benign sacs within the skin that contain keratin.  Contact Office if: Epidermal Cysts rupture or become red and tender.    Other Skin Changes Due to Chronic Exposure of Nonionizing Radiation (L57.8)    Plan: Monitoring.     Plan: Sunscreen Recommendations.  I recommended a broad spectrum sunscreen with a SPF of 30 or higher. I explained that SPF 30 sunscreens block approximately 97 percent of the  sun's harmful rays. Sunscreens should be applied at least 15 minutes prior to expected sun exposure and then every 2 hours after that as long as  sun exposure continues. If swimming or exercising sunscreen should be reapplied every 45 minutes to an hour after getting wet or sweating. One  ounce, or the equivalent of a shot glass full of sunscreen, is adequate to protect the skin not covered by a bathing suit. I also recommended a lip  balm with a sunscreen as well. Sun protective clothing can be used in lieu of sunscreen but must be worn the entire time you are exposed to the  sun's rays.      Follow up if symptoms worsen or fail to improve.    Leila Thornton MD           [1]   Current Outpatient Medications:     albuterol (PROVENTIL/VENTOLIN HFA) 90 mcg/actuation inhaler, INHALE TWO (2) PUFFS INTO THE LUNGS EVERY FOUR (4) HOURS AS NEEDED FOR WHEEZING., Disp: 18 g, Rfl: 5    amiodarone (PACERONE) 200 MG Tab, Take 200 mg by mouth once daily. (Patient not taking: Reported on 11/12/2024), Disp: , Rfl:     amiodarone (PACERONE) 200 MG Tab, Take 1 tablet (200 mg total) by mouth once  daily. (Patient not taking: Reported on 6/17/2024), Disp: 90 tablet, Rfl: 1    aspirin 81 MG Chew, Take 81 mg by mouth once daily., Disp: , Rfl:     budesonide-formoterol 160-4.5 mcg (SYMBICORT) 160-4.5 mcg/actuation HFAA, Inhale 2 puffs into the lungs twice daily, Disp: 10.2 g, Rfl: 5    digoxin (LANOXIN) 125 mcg tablet, Take 1 tablet (0.125 mg total) by mouth once daily. (Patient not taking: Reported on 11/4/2024), Disp: 90 tablet, Rfl: 1    ezetimibe (ZETIA) 10 mg tablet, Take 1 tablet (10 mg total) by mouth once daily., Disp: 30 tablet, Rfl: 0    fluticasone propionate (FLONASE) 50 mcg/actuation nasal spray, 2 sprays (100 mcg total) by Each Nostril route once daily., Disp: 16 g, Rfl: 5    furosemide (LASIX) 20 MG tablet, TAKE 1 TABLET BY MOUTH EACH MORNING FOR FLUID OR SWELLING, Disp: 90 tablet, Rfl: 1    losartan (COZAAR) 50 MG tablet, Take 1 tablet (50 mg total) by mouth once daily., Disp: 30 tablet, Rfl: 0    metoprolol succinate (TOPROL-XL) 50 MG 24 hr tablet, Take 1 tablet (50 mg total) by mouth once daily., Disp: 30 tablet, Rfl: 0    pantoprazole (PROTONIX) 40 MG tablet, TAKE ONE (1) TABLET (40 MG TOTAL) BY MOUTH ONCE DAILY., Disp: 90 tablet, Rfl: 1    rivaroxaban (XARELTO) 20 mg Tab, Take 1 tablet (20 mg total) by mouth daily with dinner. (Patient not taking: Reported on 11/12/2024), Disp: 90 tablet, Rfl: 1

## 2025-03-24 ENCOUNTER — OFFICE VISIT (OUTPATIENT)
Dept: CARDIOLOGY | Facility: CLINIC | Age: 65
End: 2025-03-24
Payer: COMMERCIAL

## 2025-03-24 VITALS
WEIGHT: 287 LBS | BODY MASS INDEX: 46.12 KG/M2 | DIASTOLIC BLOOD PRESSURE: 82 MMHG | OXYGEN SATURATION: 98 % | HEIGHT: 66 IN | HEART RATE: 64 BPM | SYSTOLIC BLOOD PRESSURE: 158 MMHG

## 2025-03-24 DIAGNOSIS — I10 HYPERTENSION, UNSPECIFIED TYPE: Primary | ICD-10-CM

## 2025-03-24 DIAGNOSIS — E66.01 MORBID OBESITY: ICD-10-CM

## 2025-03-24 DIAGNOSIS — I25.10 CORONARY ARTERY DISEASE INVOLVING NATIVE CORONARY ARTERY OF NATIVE HEART WITHOUT ANGINA PECTORIS: ICD-10-CM

## 2025-03-24 PROCEDURE — 4010F ACE/ARB THERAPY RXD/TAKEN: CPT | Mod: ,,, | Performed by: INTERNAL MEDICINE

## 2025-03-24 PROCEDURE — 93010 ELECTROCARDIOGRAM REPORT: CPT | Mod: S$PBB,,, | Performed by: INTERNAL MEDICINE

## 2025-03-24 PROCEDURE — 3079F DIAST BP 80-89 MM HG: CPT | Mod: ,,, | Performed by: INTERNAL MEDICINE

## 2025-03-24 PROCEDURE — 93005 ELECTROCARDIOGRAM TRACING: CPT | Mod: PBBFAC | Performed by: INTERNAL MEDICINE

## 2025-03-24 PROCEDURE — 3077F SYST BP >= 140 MM HG: CPT | Mod: ,,, | Performed by: INTERNAL MEDICINE

## 2025-03-24 PROCEDURE — 3008F BODY MASS INDEX DOCD: CPT | Mod: ,,, | Performed by: INTERNAL MEDICINE

## 2025-03-24 PROCEDURE — 99215 OFFICE O/P EST HI 40 MIN: CPT | Mod: PBBFAC | Performed by: INTERNAL MEDICINE

## 2025-03-24 PROCEDURE — 1160F RVW MEDS BY RX/DR IN RCRD: CPT | Mod: ,,, | Performed by: INTERNAL MEDICINE

## 2025-03-24 PROCEDURE — 1159F MED LIST DOCD IN RCRD: CPT | Mod: ,,, | Performed by: INTERNAL MEDICINE

## 2025-03-24 PROCEDURE — 99999 PR PBB SHADOW E&M-EST. PATIENT-LVL V: CPT | Mod: PBBFAC,,, | Performed by: INTERNAL MEDICINE

## 2025-03-24 PROCEDURE — 99214 OFFICE O/P EST MOD 30 MIN: CPT | Mod: S$PBB,,, | Performed by: INTERNAL MEDICINE

## 2025-03-24 NOTE — PROGRESS NOTES
asddddddddddddddddddddddddddddddddddddddddddddddddddPCP: Bony Mary MD    Referring Provider:     Subjective:   Kat Nam is a 64 y.o. female with hx of atrial fibrillation with ablation, HTN, HLD, HENRY with CPAP and PVD who presents for 1 month follow up.     During her last appointment her Losartan was increased to 50mg once daily, she has not been monitoring her blood pressure at home since that increase but appears to be better controlled in office today. She reports feeling well and denies chest pain, chest tightness, palpitations, or dizziness. She has leg swelling that is chronic and unchanged, improves with elevation of feet.     She has been exercising on a stationary bike 25 minutes (about 3 miles) 4-5 days a week. She denies any chest pain, palpitations, or shortness of breath.       Fhx:   Family History   Problem Relation Name Age of Onset    Diabetes Mellitus Mother      Heart disease Father      Hypertension Father      Diabetes Mellitus Sister      Diabetes Mellitus Brother      Heart disease Brother        Shx:   Past Surgical History:   Procedure Laterality Date    ARTHROSCOPY OF KNEE Left 2021    Procedure: ARTHROSCOPY, KNEE;  Surgeon: Noman Huerta MD;  Location: [facility] OR;  Service: Orthopedics;  Laterality: Left;    BASAL CELL CARCINOMA EXCISION  2022    left eyebrow ([facility] w/ Dr. Garcia)     SECTION      CHOLECYSTECTOMY      ECHOCARDIOGRAM,TRANSESOPHAGEAL N/A 2023    Procedure: Transesophageal echo (PHILIPPE) intra-procedure log documentation;  Surgeon: Sonu Smith MD;  Location: [facility] CATH LAB;  Service: Cardiology;  Laterality: N/A;    EXCISION OF MEDIAL MENISCUS OF KNEE Left 2021    Procedure: MENISCECTOMY, KNEE, MEDIAL;  Surgeon: Noman Huerta MD;  Location: [facility] OR;  Service: Orthopedics;  Laterality: Left;    HYSTERECTOMY      KNEE ARTHROSCOPY W/ MENISCECTOMY Left 2021    Procedure: ARTHROSCOPY, KNEE, WITH  MENISCECTOMY;  Surgeon: Noman Huerta MD;  Location: [facility] OR;  Service: Orthopedics;  Laterality: Left;  LATERAL MENISECTOMY    SPINE SURGERY  1998    Lumbar Laminectomy, L4-L5    TREATMENT OF CARDIAC ARRHYTHMIA N/A 2/8/2023    Procedure: Cardioversion or Defibrillation;  Surgeon: Sonu Smith MD;  Location: [facility] CATH LAB;  Service: Cardiology;  Laterality: N/A;            ECHO - Results for orders placed during the hospital encounter of 02/07/23    Echo    Interpretation Summary  · The left ventricle is normal in size with concentric remodeling and  · Atrial fibrillation observed.  · Mild tricuspid regurgitation.  · The estimated ejection fraction is 55%.  · Normal right ventricular size with normal right ventricular systolic function.       CATH - Results for orders placed during the hospital encounter of 02/07/23    Intra-Procedure Documentation    Narrative  PHILIPPE performed in the Invasive Lab  - See Procedure Log link below for nursing documentation  - See PHILIPPE order on Card Proc Tab for physician findings       Stress - No results found for this or any previous visit.       Lab Results   Component Value Date     12/02/2024    K 3.9 12/02/2024     12/02/2024    CO2 28 12/02/2024    BUN 19 12/02/2024    CREATININE 0.97 12/02/2024    CALCIUM 8.6 12/02/2024    ANIONGAP 10 12/02/2024    ESTGFRAFRICA 74 03/25/2021    EGFRNONAA 57 (L) 06/24/2022       Lab Results   Component Value Date    CHOL 172 12/02/2024    CHOL 214 (H) 07/31/2023    CHOL 163 02/10/2023     Lab Results   Component Value Date    HDL 52 12/02/2024    HDL 51 07/31/2023    HDL 49 02/10/2023     Lab Results   Component Value Date    LDLCALC 100 12/02/2024    LDLCALC 131 07/31/2023    LDLCALC 96 02/10/2023     Lab Results   Component Value Date    TRIG 98 12/02/2024    TRIG 161 (H) 07/31/2023    TRIG 91 02/10/2023     Lab Results   Component Value Date    CHOLHDL 3.3 12/02/2024    CHOLHDL 4.2 07/31/2023    CHOLHDL 3.3  02/10/2023       Lab Results   Component Value Date    WBC 6.12 12/02/2024    HGB 12.4 12/02/2024    HCT 40.2 12/02/2024    MCV 92.2 12/02/2024     12/02/2024           Current Outpatient Medications:     albuterol (PROVENTIL/VENTOLIN HFA) 90 mcg/actuation inhaler, INHALE TWO (2) PUFFS INTO THE LUNGS EVERY FOUR (4) HOURS AS NEEDED FOR WHEEZING., Disp: 18 g, Rfl: 5    aspirin 81 MG Chew, Take 81 mg by mouth once daily., Disp: , Rfl:     budesonide-formoterol 160-4.5 mcg (SYMBICORT) 160-4.5 mcg/actuation HFAA, Inhale 2 puffs into the lungs twice daily, Disp: 10.2 g, Rfl: 5    ezetimibe (ZETIA) 10 mg tablet, Take 1 tablet (10 mg total) by mouth once daily., Disp: 30 tablet, Rfl: 0    fluticasone propionate (FLONASE) 50 mcg/actuation nasal spray, 2 sprays (100 mcg total) by Each Nostril route once daily., Disp: 16 g, Rfl: 5    furosemide (LASIX) 20 MG tablet, TAKE 1 TABLET BY MOUTH EACH MORNING FOR FLUID OR SWELLING, Disp: 90 tablet, Rfl: 1    losartan (COZAAR) 50 MG tablet, Take 1 tablet (50 mg total) by mouth once daily., Disp: 30 tablet, Rfl: 0    metoprolol succinate (TOPROL-XL) 50 MG 24 hr tablet, Take 1 tablet (50 mg total) by mouth once daily., Disp: 30 tablet, Rfl: 0    pantoprazole (PROTONIX) 40 MG tablet, TAKE ONE (1) TABLET (40 MG TOTAL) BY MOUTH ONCE DAILY., Disp: 90 tablet, Rfl: 1    amiodarone (PACERONE) 200 MG Tab, Take 200 mg by mouth once daily. (Patient not taking: Reported on 11/4/2024), Disp: , Rfl:     amiodarone (PACERONE) 200 MG Tab, Take 1 tablet (200 mg total) by mouth once daily. (Patient not taking: Reported on 3/24/2025), Disp: 90 tablet, Rfl: 1    digoxin (LANOXIN) 125 mcg tablet, Take 1 tablet (0.125 mg total) by mouth once daily. (Patient not taking: Reported on 5/22/2024), Disp: 90 tablet, Rfl: 1    rivaroxaban (XARELTO) 20 mg Tab, Take 1 tablet (20 mg total) by mouth daily with dinner. (Patient not taking: Reported on 3/24/2025), Disp: 90 tablet, Rfl: 1    Review of Systems  "  Constitutional: Positive for chills. Negative for fever and weight gain.        Chills earlier this week - sick contact with grandchildren recently. Feels improved now.    HENT:  Negative for congestion, ear pain and sore throat.    Eyes:  Negative for blurred vision and pain.   Cardiovascular:  Positive for leg swelling. Negative for chest pain, dyspnea on exertion and palpitations.        Chronic lower extremity swelling improves with elevation of legs - unchanged.    Respiratory:  Positive for snoring. Negative for cough.         HENRY with CPAP nightly.    Endocrine: Negative for polydipsia and polyuria.   Skin:  Negative for dry skin, itching and rash.   Musculoskeletal:  Positive for back pain and joint pain.        Chronic back pain and joint pain - Unchanged.    Gastrointestinal:  Negative for abdominal pain, diarrhea, nausea and vomiting.   Genitourinary:  Negative for dysuria, frequency and hematuria.   Neurological:  Positive for headaches. Negative for dizziness, numbness and paresthesias.        Minor headache earlier this week after sick contact with children - resolved.    Psychiatric/Behavioral:  Negative for depression. The patient is not nervous/anxious.           Objective:   BP (!) 158/82 (BP Location: Left arm, Patient Position: Sitting)   Pulse 64   Ht 5' 6" (1.676 m)   Wt 130.2 kg (287 lb)   SpO2 98%   BMI 46.32 kg/m²       Physical Exam  Constitutional:       Appearance: Normal appearance. She is obese.   HENT:      Head: Normocephalic and atraumatic.      Nose: Nose normal.   Eyes:      Pupils: Pupils are equal, round, and reactive to light.   Cardiovascular:      Rate and Rhythm: Normal rate and regular rhythm.      Pulses: Normal pulses.      Heart sounds: Murmur heard.   Pulmonary:      Effort: Pulmonary effort is normal.      Breath sounds: Normal breath sounds.   Abdominal:      Palpations: Abdomen is soft.   Musculoskeletal:         General: Normal range of motion.      Cervical " back: Normal range of motion.   Skin:     General: Skin is warm and dry.   Neurological:      General: No focal deficit present.      Mental Status: She is alert and oriented to person, place, and time.   Psychiatric:         Mood and Affect: Mood normal.         Thought Content: Thought content normal.         Judgment: Judgment normal.         Assessment:     1. Hypertension, unspecified type  EKG 12-lead    EKG 12-lead            Plan:   Follow up in six months, sooner if symptoms change

## 2025-03-27 LAB
OHS QRS DURATION: 112 MS
OHS QTC CALCULATION: 434 MS

## 2025-04-15 RX ORDER — LOSARTAN POTASSIUM 50 MG/1
50 TABLET ORAL DAILY
Qty: 30 TABLET | Refills: 5 | Status: SHIPPED | OUTPATIENT
Start: 2025-04-15 | End: 2025-10-12

## 2025-04-15 RX ORDER — METOPROLOL SUCCINATE 50 MG/1
50 TABLET, EXTENDED RELEASE ORAL DAILY
Qty: 30 TABLET | Refills: 5 | Status: SHIPPED | OUTPATIENT
Start: 2025-04-15 | End: 2025-10-12

## 2025-05-06 DIAGNOSIS — E78.5 HYPERLIPIDEMIA, UNSPECIFIED HYPERLIPIDEMIA TYPE: ICD-10-CM

## 2025-05-13 RX ORDER — EZETIMIBE 10 MG/1
10 TABLET ORAL DAILY
Qty: 90 TABLET | Refills: 1 | Status: SHIPPED | OUTPATIENT
Start: 2025-05-13

## 2025-05-13 RX ORDER — FUROSEMIDE 20 MG/1
20 TABLET ORAL EVERY MORNING
Qty: 90 TABLET | Refills: 1 | Status: SHIPPED | OUTPATIENT
Start: 2025-05-13

## 2025-05-27 ENCOUNTER — OFFICE VISIT (OUTPATIENT)
Dept: FAMILY MEDICINE | Facility: CLINIC | Age: 65
End: 2025-05-27
Payer: COMMERCIAL

## 2025-05-27 VITALS
OXYGEN SATURATION: 97 % | WEIGHT: 284 LBS | DIASTOLIC BLOOD PRESSURE: 82 MMHG | BODY MASS INDEX: 45.64 KG/M2 | SYSTOLIC BLOOD PRESSURE: 134 MMHG | TEMPERATURE: 98 F | HEART RATE: 57 BPM | HEIGHT: 66 IN | RESPIRATION RATE: 18 BRPM

## 2025-05-27 DIAGNOSIS — I10 HYPERTENSION, UNSPECIFIED TYPE: Primary | ICD-10-CM

## 2025-05-27 DIAGNOSIS — E78.5 HYPERLIPIDEMIA, UNSPECIFIED HYPERLIPIDEMIA TYPE: ICD-10-CM

## 2025-05-27 PROCEDURE — 4010F ACE/ARB THERAPY RXD/TAKEN: CPT | Mod: ,,, | Performed by: FAMILY MEDICINE

## 2025-05-27 PROCEDURE — 99213 OFFICE O/P EST LOW 20 MIN: CPT | Mod: ,,, | Performed by: FAMILY MEDICINE

## 2025-05-27 PROCEDURE — 1160F RVW MEDS BY RX/DR IN RCRD: CPT | Mod: ,,, | Performed by: FAMILY MEDICINE

## 2025-05-27 PROCEDURE — 1159F MED LIST DOCD IN RCRD: CPT | Mod: ,,, | Performed by: FAMILY MEDICINE

## 2025-05-27 PROCEDURE — 3075F SYST BP GE 130 - 139MM HG: CPT | Mod: ,,, | Performed by: FAMILY MEDICINE

## 2025-05-27 PROCEDURE — 3079F DIAST BP 80-89 MM HG: CPT | Mod: ,,, | Performed by: FAMILY MEDICINE

## 2025-05-27 PROCEDURE — 3008F BODY MASS INDEX DOCD: CPT | Mod: ,,, | Performed by: FAMILY MEDICINE

## 2025-05-27 NOTE — PROGRESS NOTES
Kat Nam is a 64 y.o. female seen today for follow-up on her history of asthma hypertension and a history of dysrhythmia.  Her dysrhythmia has been stable since her ablation and she denies any chest pain or shortness for breath recent exacerbations.  We did discuss her labs from December of last year where her LDL was 100 and this is on the Zetia.  With her current risk factors this is probably acceptable and I encouraged her to continue to have a healthy diet.    Past Medical History:   Diagnosis Date    Asthma     Bilateral leg pain     Chronic low back pain     Compression of vein     Iliac vein compression syndrome    Digestive disorder     Edema of lower extremity     Gastroesophageal reflux disease     History of basal cell carcinoma (BCC) of skin 02/28/2022    Hx of phlebitis     Hx of thrombophlebitis     Hyperlipidemia     Hypertension     Lymphedema, not elsewhere classified     Morbid obesity     Other skin changes     Peripheral edema     Peripheral vascular disease, unspecified     Postartificial menopausal syndrome     Premature atrial contraction     Sleep apnea      Family History   Problem Relation Name Age of Onset    Diabetes Mellitus Mother      Heart disease Father      Hypertension Father      Diabetes Mellitus Sister      Diabetes Mellitus Brother      Heart disease Brother       Medications Ordered Prior to Encounter[1]  Immunization History   Administered Date(s) Administered    Influenza - Quadrivalent - PF *Preferred* (6 months and older) 11/04/2022, 11/21/2023    Influenza - Trivalent - Flucelvax - PF 11/25/2024       Review of Systems   Constitutional:  Negative for fever, malaise/fatigue and weight loss.   Respiratory:  Negative for shortness of breath.    Cardiovascular:  Negative for chest pain and palpitations.   Gastrointestinal:  Negative for nausea and vomiting.   Psychiatric/Behavioral:  Negative for depression.         Vitals:    05/27/25 1323   BP: 134/82   Pulse: (!) 57    Resp: 18   Temp: 98.2 °F (36.8 °C)       Physical Exam  Vitals reviewed.   Constitutional:       Appearance: Normal appearance.   HENT:      Head: Normocephalic.   Eyes:      Extraocular Movements: Extraocular movements intact.      Conjunctiva/sclera: Conjunctivae normal.      Pupils: Pupils are equal, round, and reactive to light.   Neck:      Thyroid: No thyroid mass or thyromegaly.   Cardiovascular:      Rate and Rhythm: Normal rate and regular rhythm.      Heart sounds: Normal heart sounds. No murmur heard.     No gallop.   Pulmonary:      Effort: Pulmonary effort is normal. No respiratory distress.      Breath sounds: Normal breath sounds. No wheezing or rales.   Skin:     General: Skin is warm and dry.      Coloration: Skin is not jaundiced or pale.   Neurological:      Mental Status: She is alert.   Psychiatric:         Mood and Affect: Mood normal.         Behavior: Behavior normal.         Thought Content: Thought content normal.         Judgment: Judgment normal.          Assessment and Plan  1. Hypertension, unspecified type  -     CBC Auto Differential; Future; Expected date: 06/03/2025  -     Comprehensive Metabolic Panel; Future; Expected date: 06/03/2025    2. Hyperlipidemia, unspecified hyperlipidemia type  -     Lipid Panel; Future; Expected date: 06/03/2025             Return to clinic in August of this year or as needed.    Health Maintenance Topics with due status: Not Due       Topic Last Completion Date    Lipid Panel 12/02/2024              [1]   Current Outpatient Medications on File Prior to Visit   Medication Sig Dispense Refill    albuterol (PROVENTIL/VENTOLIN HFA) 90 mcg/actuation inhaler INHALE TWO (2) PUFFS INTO THE LUNGS EVERY FOUR (4) HOURS AS NEEDED FOR WHEEZING. 18 g 5    aspirin 81 MG Chew Take 81 mg by mouth once daily.      budesonide-formoterol 160-4.5 mcg (SYMBICORT) 160-4.5 mcg/actuation HFAA Inhale 2 puffs into the lungs twice daily 10.2 g 5    ezetimibe (ZETIA) 10 mg  tablet Take 1 tablet (10 mg total) by mouth once daily. for cholesterol 90 tablet 1    fluticasone propionate (FLONASE) 50 mcg/actuation nasal spray 2 sprays (100 mcg total) by Each Nostril route once daily. 16 g 5    furosemide (LASIX) 20 MG tablet Take 1 tablet (20 mg total) by mouth every morning FOR SWELLING 90 tablet 1    losartan (COZAAR) 50 MG tablet Take 1 tablet (50 mg total) by mouth once daily. 30 tablet 5    metoprolol succinate (TOPROL-XL) 50 MG 24 hr tablet Take 1 tablet (50 mg total) by mouth once daily. 30 tablet 5    neomycin-polymyxin-dexamethasone (MAXITROL) 3.5mg/mL-10,000 unit/mL-0.1 % DrpS Place 1 drop into the left eye 4 (four) times daily as directed 5 mL 0    pantoprazole (PROTONIX) 40 MG tablet TAKE ONE (1) TABLET (40 MG TOTAL) BY MOUTH ONCE DAILY. 90 tablet 1    amiodarone (PACERONE) 200 MG Tab Take 200 mg by mouth once daily. (Patient not taking: Reported on 5/27/2025)      amiodarone (PACERONE) 200 MG Tab Take 1 tablet (200 mg total) by mouth once daily. (Patient not taking: Reported on 6/17/2024) 90 tablet 1    digoxin (LANOXIN) 125 mcg tablet Take 1 tablet (0.125 mg total) by mouth once daily. 90 tablet 1    rivaroxaban (XARELTO) 20 mg Tab Take 1 tablet (20 mg total) by mouth daily with dinner. (Patient not taking: Reported on 11/12/2024) 90 tablet 1     No current facility-administered medications on file prior to visit.

## 2025-05-29 ENCOUNTER — RESULTS FOLLOW-UP (OUTPATIENT)
Dept: FAMILY MEDICINE | Facility: CLINIC | Age: 65
End: 2025-05-29

## 2025-05-29 ENCOUNTER — TELEPHONE (OUTPATIENT)
Dept: FAMILY MEDICINE | Facility: CLINIC | Age: 65
End: 2025-05-29
Payer: COMMERCIAL

## 2025-05-29 NOTE — TELEPHONE ENCOUNTER
Notified patient of results. Patient voiced understanding. Patient stated she would be unable to come in before August as she is retiring next week and won't have insurance until then. She already has an appointment scheduled for 08/27/25.

## 2025-05-29 NOTE — TELEPHONE ENCOUNTER
----- Message from Bony Mary MD sent at 5/29/2025  8:41 AM CDT -----  Good liver and renal; function. OV for LDL  ----- Message -----  From: Lab, Background User  Sent: 5/28/2025   7:41 PM CDT  To: Bony Mary MD

## 2025-08-18 ENCOUNTER — TELEPHONE (OUTPATIENT)
Dept: SURGERY | Facility: CLINIC | Age: 65
End: 2025-08-18

## 2025-08-21 DIAGNOSIS — E04.1 THYROID NODULE: Primary | ICD-10-CM

## 2025-08-27 ENCOUNTER — OFFICE VISIT (OUTPATIENT)
Dept: FAMILY MEDICINE | Facility: CLINIC | Age: 65
End: 2025-08-27
Payer: MEDICARE

## 2025-08-27 VITALS
DIASTOLIC BLOOD PRESSURE: 78 MMHG | HEIGHT: 66 IN | BODY MASS INDEX: 46.45 KG/M2 | WEIGHT: 289 LBS | TEMPERATURE: 98 F | SYSTOLIC BLOOD PRESSURE: 120 MMHG | OXYGEN SATURATION: 97 % | HEART RATE: 58 BPM | RESPIRATION RATE: 16 BRPM

## 2025-08-27 DIAGNOSIS — K21.9 GASTROESOPHAGEAL REFLUX DISEASE, UNSPECIFIED WHETHER ESOPHAGITIS PRESENT: ICD-10-CM

## 2025-08-27 DIAGNOSIS — Z53.20 STATIN MEDICATION DECLINED BY PATIENT: ICD-10-CM

## 2025-08-27 DIAGNOSIS — E78.5 HYPERLIPIDEMIA, UNSPECIFIED HYPERLIPIDEMIA TYPE: Primary | ICD-10-CM

## 2025-08-27 PROCEDURE — 99213 OFFICE O/P EST LOW 20 MIN: CPT | Mod: ,,, | Performed by: FAMILY MEDICINE

## 2025-08-27 RX ORDER — EZETIMIBE 10 MG/1
TABLET ORAL
Qty: 90 TABLET | Refills: 1 | Status: SHIPPED | OUTPATIENT
Start: 2025-08-27

## 2025-08-27 RX ORDER — PANTOPRAZOLE SODIUM 40 MG/1
TABLET, DELAYED RELEASE ORAL
Qty: 90 TABLET | Refills: 1 | Status: SHIPPED | OUTPATIENT
Start: 2025-08-27

## 2025-08-29 ENCOUNTER — HOSPITAL ENCOUNTER (OUTPATIENT)
Dept: RADIOLOGY | Facility: HOSPITAL | Age: 65
Discharge: HOME | End: 2025-08-29
Attending: STUDENT IN AN ORGANIZED HEALTH CARE EDUCATION/TRAINING PROGRAM
Payer: MEDICARE

## (undated) DEVICE — GLOVE SURGICAL PROTEXIS PI CLASSIC SIZE 6.5

## (undated) DEVICE — CDS KNEE ARTHROSCOPY

## (undated) DEVICE — SOL IRRIGATION SALINE 3000ML BAG

## (undated) DEVICE — GLOVE SURGICAL PROTEXIS PI BLUE SIZE 8.5

## (undated) DEVICE — ELECTRODE EKG QUICK COMBO

## (undated) DEVICE — GOWN SURGICAL SMARTGOWN LEVEL 4 / EXTRA LARGE STERILE

## (undated) DEVICE — OXISENSOR ADULT DIGIT N/S

## (undated) DEVICE — TUBING ARTHRO STRYKER

## (undated) DEVICE — WRAP KNEE ACTIVE PO WITH 4 COLD PKS L/XL

## (undated) DEVICE — SYRINGE 30CC LL

## (undated) DEVICE — APPLICATOR CHLORAPREP HI-LITE TINTED ORANGE 26ML

## (undated) DEVICE — GLOVE SURGICAL PROTEXIS PI BLUE SIZE 8.0

## (undated) DEVICE — TOURNIQUET CUFF DISP QC 34 INCH

## (undated) DEVICE — BANDAGE ELASTIC FLEX-MASTER 6INX11YD STL DBL LNGTH

## (undated) DEVICE — GLOVE SURGICAL PROTEXIS PI CLASSIC SIZE 7.5

## (undated) DEVICE — GLOVE SURGICAL PROTEXIS PI CLASSIC SIZE 8.0

## (undated) DEVICE — CUTTER TOMCAT 4X125MM

## (undated) DEVICE — TIP YANKAUERS BULB NO VENT

## (undated) DEVICE — SET EXTENSION CLEARLINK 2INJ

## (undated) DEVICE — SET IV PRIMARY

## (undated) DEVICE — GOWN SURGICAL STERILE LEVEL 3 / XX-LARGE